# Patient Record
Sex: FEMALE | Race: WHITE | NOT HISPANIC OR LATINO | Employment: FULL TIME | URBAN - METROPOLITAN AREA
[De-identification: names, ages, dates, MRNs, and addresses within clinical notes are randomized per-mention and may not be internally consistent; named-entity substitution may affect disease eponyms.]

---

## 2017-01-04 ENCOUNTER — DOCUMENTATION ONLY (OUTPATIENT)
Dept: OTHER | Facility: CLINIC | Age: 37
End: 2017-01-04

## 2017-01-04 DIAGNOSIS — Z71.89 ACP (ADVANCE CARE PLANNING): Primary | Chronic | ICD-10-CM

## 2017-01-24 ENCOUNTER — OFFICE VISIT (OUTPATIENT)
Dept: FAMILY MEDICINE | Facility: CLINIC | Age: 37
End: 2017-01-24
Payer: COMMERCIAL

## 2017-01-24 VITALS
HEART RATE: 78 BPM | WEIGHT: 256.4 LBS | OXYGEN SATURATION: 100 % | DIASTOLIC BLOOD PRESSURE: 74 MMHG | BODY MASS INDEX: 40.15 KG/M2 | SYSTOLIC BLOOD PRESSURE: 112 MMHG | TEMPERATURE: 97.7 F

## 2017-01-24 DIAGNOSIS — L30.8 OTHER ECZEMA: ICD-10-CM

## 2017-01-24 DIAGNOSIS — L30.9 DERMATITIS: Primary | ICD-10-CM

## 2017-01-24 PROCEDURE — 99213 OFFICE O/P EST LOW 20 MIN: CPT | Performed by: PHYSICIAN ASSISTANT

## 2017-01-24 RX ORDER — PREDNISONE 20 MG/1
20 TABLET ORAL DAILY
Qty: 5 TABLET | Refills: 0 | Status: SHIPPED | OUTPATIENT
Start: 2017-01-24 | End: 2017-04-29

## 2017-01-24 RX ORDER — TRIAMCINOLONE ACETONIDE 1 MG/G
CREAM TOPICAL
Qty: 30 G | Refills: 1 | Status: SHIPPED | OUTPATIENT
Start: 2017-01-24 | End: 2017-05-26

## 2017-01-24 ASSESSMENT — ENCOUNTER SYMPTOMS
FOCAL WEAKNESS: 0
NAUSEA: 0
CHILLS: 0
DIARRHEA: 0
FEVER: 0
SHORTNESS OF BREATH: 0
VOMITING: 0
HEADACHES: 0
ABDOMINAL PAIN: 0

## 2017-01-24 NOTE — PROGRESS NOTES
SUBJECTIVE:                                                    Karolina Herrera is a 37 year old female who presents to clinic today for the following health issues:    Rash     Onset: 1 week     Description:   Location: Abdomen, arms and bottom   Character: raised, red, painful/prickly   Itching (Pruritis): YES    Progression of Symptoms:  Improving slightly     Accompanying Signs & Symptoms:  Fever: no   Body aches or joint pain: no   Sore throat symptoms: no   Recent cold symptoms: no    History:   Previous similar rash: YES- always went away on its own in the past     Precipitating factors:   Exposure to similar rash: no   New exposures: soaps- used baby bubble bath   Recent travel: no     Alleviating factors:  None     Therapies Tried and outcome: Benadryl- not helpful, cleaning the skin, lotion    Concern - Derm Problem     Onset: Off and on x1 year     Description:   Cracking and peeling of skin on both hands    Intensity: moderate    Progression of Symptoms:  worsening    Accompanying Signs & Symptoms:  None        Previous history of similar problem:   No    Precipitating factors:   Worsened by: cold weather    Alleviating factors:  Improved by: None        Therapies Tried and outcome: Vaseline and hydrocortisone- not helpful      Additional complaints: None    HPI additional notes:  Denies throat/tongue swelling, abd pain, N/V, CP, SOB, sore throat, or any other symptoms.       Chart Review:  History   Smoking status     Never Smoker    Smokeless tobacco     Not on file     No flowsheet data found.  No flowsheet data found.      Patient Active Problem List   Diagnosis     Morbid obesity due to excess calories (H)     Family history of malignant neoplasm of breast     PCOS (polycystic ovarian syndrome)     Irregular menstrual cycle     Irritable bowel syndrome     Body mass index (BMI) greater than 30 in adult     Right-sided low back pain without sciatica     Low back pain     Obesity with body mass  index 30 or greater     Candidiasis of mouth     Encounter for gynecological examination without abnormal finding     ACP (advance care planning)     Past Surgical History   Procedure Laterality Date     [place] teeth removed       Nasal/sinus polypectomy       Ivs       inveitro fertilization     Laparoscopic cholecystectomy N/A 11/7/2016     Procedure: LAPAROSCOPIC CHOLECYSTECTOMY;  Surgeon: Juan F Sherman MD;  Location:  OR     Problem list, Medication list, Allergies, Medical/Social/Surg hx reviewed in [facility], updated as appropriate.    HPI      Review of Systems   Constitutional: Negative for fever and chills.   Respiratory: Negative for shortness of breath.    Cardiovascular: Negative for chest pain.   Gastrointestinal: Negative for nausea, vomiting, abdominal pain and diarrhea.   Skin: Positive for itching and rash.   Neurological: Negative for focal weakness and headaches.   All other systems reviewed and are negative.        Physical Exam   Constitutional: She is oriented to person, place, and time and well-developed, well-nourished, and in no distress.   HENT:   Head: Normocephalic and atraumatic.   Right Ear: Tympanic membrane, external ear and ear canal normal.   Left Ear: Tympanic membrane, external ear and ear canal normal.   Nose: Nose normal.   Mouth/Throat: Uvula is midline and oropharynx is clear and moist. No posterior oropharyngeal edema.   Cardiovascular: Normal rate, regular rhythm and normal heart sounds.    Pulmonary/Chest: Effort normal and breath sounds normal.   Musculoskeletal: Normal range of motion.   Neurological: She is alert and oriented to person, place, and time. Gait normal.   Skin: Skin is warm and dry. Rash noted. No bruising noted.   Dry peeling skin to thenar eminences of both hands- No purulent drainage, increased warmth, or erythema    Fine papular rash to abdomen, bilateral arms, buttock, and low back.    Nursing note and vitals reviewed.      Vital Signs  /74  "mmHg  Pulse 78  Temp(Src) 97.7  F (36.5  C) (Oral)  Wt 256 lb 6.4 oz (116.302 kg)  SpO2 100%   Body mass index is 40.15 kg/(m^2).    Diagnostic Test Results:  none     ASSESSMENT/PLAN:                                                    BMI:   Estimated body mass index is 40.15 kg/(m^2) as calculated from the following:    Height as of 11/7/16: 5' 7.01\" (1.702 m).    Weight as of this encounter: 256 lb 6.4 oz (116.302 kg).   Weight management plan: healthy diet & exercise      ICD-10-CM    1. Dermatitis L30.9 predniSONE (DELTASONE) 20 MG tablet   2. Other eczema L30.8 triamcinolone (KENALOG) 0.1 % cream   Rash to hands c/w eczema. Rx triamcinolone. Recommended Eucerin or other thick lotion.  Rash to trunk & bilateral arms c/w dermatitis, possible allergic reaction to bubble bath. No oropharyngeal edema or wheezing, or other s/sx anaphylaxis. Rx short course of low dose prednisone. Continue Benadryl.    I have discussed any lab or imaging results, the patient's diagnosis, and my plan of treatment with the patient and/or family. Patient is aware to come back in if with worsening symptoms or if no relief despite treatment plan.  Patient voiced understanding and had no further questions.       Follow Up: Return if symptoms worsen or fail to improve.    LAKEISHA Hansen, PA-C  [facility] [place]            "

## 2017-02-01 ENCOUNTER — THERAPY VISIT (OUTPATIENT)
Dept: PHYSICAL THERAPY | Facility: CLINIC | Age: 37
End: 2017-02-01
Payer: COMMERCIAL

## 2017-02-01 DIAGNOSIS — M54.50 RIGHT-SIDED LOW BACK PAIN WITHOUT SCIATICA: Primary | ICD-10-CM

## 2017-02-01 PROCEDURE — 97112 NEUROMUSCULAR REEDUCATION: CPT | Mod: GP | Performed by: PHYSICAL THERAPIST

## 2017-02-01 PROCEDURE — 97110 THERAPEUTIC EXERCISES: CPT | Mod: GP | Performed by: PHYSICAL THERAPIST

## 2017-02-07 ENCOUNTER — OFFICE VISIT (OUTPATIENT)
Dept: FAMILY MEDICINE | Facility: CLINIC | Age: 37
End: 2017-02-07
Payer: COMMERCIAL

## 2017-02-07 VITALS
OXYGEN SATURATION: 97 % | WEIGHT: 251.5 LBS | HEART RATE: 100 BPM | BODY MASS INDEX: 39.47 KG/M2 | TEMPERATURE: 99.1 F | SYSTOLIC BLOOD PRESSURE: 120 MMHG | DIASTOLIC BLOOD PRESSURE: 86 MMHG | HEIGHT: 67 IN

## 2017-02-07 DIAGNOSIS — R50.9 FEVER, UNSPECIFIED: ICD-10-CM

## 2017-02-07 DIAGNOSIS — J10.1 INFLUENZA A: Primary | ICD-10-CM

## 2017-02-07 DIAGNOSIS — R07.0 THROAT PAIN: ICD-10-CM

## 2017-02-07 LAB
DEPRECATED S PYO AG THROAT QL EIA: NORMAL
FLUAV+FLUBV AG SPEC QL: ABNORMAL
FLUAV+FLUBV AG SPEC QL: ABNORMAL
MICRO REPORT STATUS: NORMAL
SPECIMEN SOURCE: ABNORMAL
SPECIMEN SOURCE: NORMAL

## 2017-02-07 PROCEDURE — 87880 STREP A ASSAY W/OPTIC: CPT | Performed by: FAMILY MEDICINE

## 2017-02-07 PROCEDURE — 87081 CULTURE SCREEN ONLY: CPT | Performed by: FAMILY MEDICINE

## 2017-02-07 PROCEDURE — 87804 INFLUENZA ASSAY W/OPTIC: CPT | Performed by: FAMILY MEDICINE

## 2017-02-07 PROCEDURE — 99214 OFFICE O/P EST MOD 30 MIN: CPT | Performed by: FAMILY MEDICINE

## 2017-02-07 RX ORDER — ALBUTEROL SULFATE 90 UG/1
2 AEROSOL, METERED RESPIRATORY (INHALATION) EVERY 6 HOURS PRN
Qty: 1 INHALER | Refills: 0 | Status: SHIPPED | OUTPATIENT
Start: 2017-02-07 | End: 2017-05-26

## 2017-02-07 RX ORDER — OSELTAMIVIR PHOSPHATE 75 MG/1
75 CAPSULE ORAL 2 TIMES DAILY
Qty: 10 CAPSULE | Refills: 0 | Status: SHIPPED | OUTPATIENT
Start: 2017-02-07 | End: 2017-04-29

## 2017-02-07 NOTE — PROGRESS NOTES
SUBJECTIVE:                                                    Karolina Herrera is a 37 year old female who presents to clinic today for the following health issues:    Acute Illness   Acute illness concerns: cough  Onset: 4 days     Fever: YES- 101.8F oral     Chills/Sweats: YES- both    Headache (location?): YES- frontal     Sinus Pressure:no    Conjunctivitis:  no    Ear Pain: YES: bilateral    Rhinorrhea: YES    Congestion: YES    Sore Throat: YES-hoarse voice      Cough: YES-productive of sputum, with shortness of breath all the time, worsening over time    Wheeze: YES    Decreased Appetite: YES    Nausea: no    Vomiting: no    Diarrhea:  YES    Dysuria/Freq.: no    Fatigue/Achiness: YES    Sick/Strep Exposure: YES- son and cousin were sick and nephew and niece had strep last week and were on antibiotics      Therapies Tried and outcome: nyquil, tylenol and cough drops     10 yrs ago strep.     Short of breath and chest burning with coughing.     Her wife is accompanying her and she is also seeing me. She is renal transplant patient and I diagnosed her with influenza a and treat her with tamiful.    Both of them have flu shots.     Problem list and histories reviewed & adjusted, as indicated.  Additional history: as documented    Problem list, Medication list, Allergies, and Medical/Social/Surgical histories reviewed in EPIC and updated as appropriate.      Social History     Social History     Marital Status:      Spouse Name: Samantha     Number of Children: N/A     Years of Education: N/A     Occupational History           works for [employer]     Social History Main Topics     Smoking status: Never Smoker      Smokeless tobacco: None     Alcohol Use: No     Drug Use: No     Sexual Activity:     Partners: Female      Comment: pregnant via IVF, due in August 2015     Other Topics Concern      Service No     Blood Transfusions No     Caffeine Concern No     Occupational Exposure No  "    Hobby Hazards No     Sleep Concern No     Stress Concern No     Weight Concern No     Special Diet No     Back Care No     Exercise Yes     walks dog 2-3 times/day; prenatal exercises     Seat Belt Yes     Self-Exams Yes     Social History Narrative     No Known Allergies  Patient Active Problem List   Diagnosis     Morbid obesity due to excess calories (H)     Family history of malignant neoplasm of breast     PCOS (polycystic ovarian syndrome)     Irregular menstrual cycle     Irritable bowel syndrome     Body mass index (BMI) greater than 30 in adult     Right-sided low back pain without sciatica     Low back pain     Obesity with body mass index 30 or greater     Candidiasis of mouth     Encounter for gynecological examination without abnormal finding     ACP (advance care planning)     Reviewed medications, social history and  past medical and surgical history.    Review of system: for general, respiratory, CVS, GI and psychiatry negative except for noted above.     EXAM:  /86 mmHg  Pulse 100  Temp(Src) 99.1  F (37.3  C) (Oral)  Ht 5' 7.25\" (1.708 m)  Wt 251 lb 8 oz (114.08 kg)  BMI 39.11 kg/m2  SpO2 97%  Constitutional: healthy, alert and no distress   Psychiatric: mentation appears normal and affect normal/bright  Cardiovascular: RRR. No murmurs,  Respiratory: negative, Lungs clear. No crackles or wheezing. No tachypnea. Decreased air entry both lung bases.   ENT: Both TM exam normal, no neck nodes or sinus tenderness, mild nasal turbinate hypertrophy, throat mild erythema     ASSESSMENT / PLAN:  (J10.1) Influenza A  (primary encounter diagnosis)  Comment: unfortunately beyond 72 hrs period. Discussed home care. Wife who is diagnosed with influenza and due to her immunocompromised status, she got tamiflu and she is requesting patient can get that too. As Karolina comes in contact with immunocompromised patient it may be OK to use but I am not entirely sure if it is going to shorten her duration " and Karolina does not have any risk factor. They understood. OK to do tamiflu for Karolina too.   Patient also has some chest tightness with cough, OK to use albuterol as needed. May need xray to rule out secondary infection if symptoms are not improving.   Plan: albuterol (PROAIR HFA/PROVENTIL HFA/VENTOLIN         HFA) 108 (90 BASE) MCG/ACT Inhaler, oseltamivir        (TAMIFLU) 75 MG capsule             (R07.0) Throat pain  Comment:  See above. Strep negative.   Plan: Strep, Rapid Screen, Beta strep group A culture             (R50.9) Fever, unspecified  Comment:    Plan: Influenza A/B antigen, albuterol (PROAIR         HFA/PROVENTIL HFA/VENTOLIN HFA) 108 (90 BASE)         MCG/ACT Inhaler

## 2017-02-07 NOTE — NURSING NOTE
"Chief Complaint   Patient presents with     Cough       Initial /86 mmHg  Pulse 100  Temp(Src) 99.1  F (37.3  C) (Oral)  Ht 5' 7.25\" (1.708 m)  Wt 251 lb 8 oz (114.08 kg)  BMI 39.11 kg/m2  SpO2 97% Estimated body mass index is 39.11 kg/(m^2) as calculated from the following:    Height as of this encounter: 5' 7.25\" (1.708 m).    Weight as of this encounter: 251 lb 8 oz (114.08 kg).  Medication Reconciliation: complete     Christiana Jane CMA      "

## 2017-02-09 LAB
BACTERIA SPEC CULT: NORMAL
MICRO REPORT STATUS: NORMAL
SPECIMEN SOURCE: NORMAL

## 2017-02-13 ENCOUNTER — HOSPITAL ENCOUNTER (OUTPATIENT)
Dept: GENERAL RADIOLOGY | Facility: CLINIC | Age: 37
Discharge: HOME | End: 2017-02-13
Attending: PHYSICIAN ASSISTANT | Admitting: PHYSICIAN ASSISTANT
Payer: COMMERCIAL

## 2017-02-13 ENCOUNTER — OFFICE VISIT (OUTPATIENT)
Dept: FAMILY MEDICINE | Facility: CLINIC | Age: 37
End: 2017-02-13
Payer: COMMERCIAL

## 2017-02-13 DIAGNOSIS — R07.0 THROAT PAIN: Primary | ICD-10-CM

## 2017-02-13 DIAGNOSIS — R05.9 COUGH: ICD-10-CM

## 2017-02-13 LAB
DEPRECATED S PYO AG THROAT QL EIA: NORMAL
MICRO REPORT STATUS: NORMAL
SPECIMEN SOURCE: NORMAL

## 2017-02-13 PROCEDURE — 71020 XR CHEST 2 VW: CPT

## 2017-02-13 PROCEDURE — 99213 OFFICE O/P EST LOW 20 MIN: CPT | Performed by: PHYSICIAN ASSISTANT

## 2017-02-13 PROCEDURE — 87081 CULTURE SCREEN ONLY: CPT | Performed by: PHYSICIAN ASSISTANT

## 2017-02-13 PROCEDURE — 87880 STREP A ASSAY W/OPTIC: CPT | Performed by: PHYSICIAN ASSISTANT

## 2017-02-13 RX ORDER — METHYLPREDNISOLONE 4 MG
TABLET, DOSE PACK ORAL
Qty: 21 TABLET | Refills: 0 | Status: SHIPPED | OUTPATIENT
Start: 2017-02-13 | End: 2017-04-29

## 2017-02-13 RX ORDER — BENZONATATE 100 MG/1
100 CAPSULE ORAL 3 TIMES DAILY PRN
Qty: 16 CAPSULE | Refills: 0 | Status: SHIPPED | OUTPATIENT
Start: 2017-02-13 | End: 2017-05-26

## 2017-02-13 ASSESSMENT — ENCOUNTER SYMPTOMS
ABDOMINAL PAIN: 0
MYALGIAS: 1
CHILLS: 0
FEVER: 1
SORE THROAT: 1
DIARRHEA: 0
NAUSEA: 0
FOCAL WEAKNESS: 0
WHEEZING: 0
HEADACHES: 0
SHORTNESS OF BREATH: 1
COUGH: 1
VOMITING: 0

## 2017-02-13 NOTE — PROGRESS NOTES
HPI    SUBJECTIVE:                                                    Karolina Herrera is a 37 year old female who presents to clinic today for the following health issues:    Acute Illness   Acute illness concerns: Flu concerns, had Influenza A last week  Onset: 9 days    Fever: no    Chills/Sweats: YES- sweats    Headache (location?): no    Sinus Pressure:no    Conjunctivitis:  no    Ear Pain: YES: both    Rhinorrhea: YES    Congestion: YES    Sore Throat: YES     Cough: YES    Wheeze: no    Decreased Appetite: no    Nausea: no    Vomiting: no    Diarrhea:  YES    Dysuria/Freq.: no    Fatigue/Achiness: YES    Sick/Strep Exposure: YES     Therapies Tried and outcome: Tamiflu last week, Albuterol inhaler    Pt reports all symptoms except cough and SOB have improved. Albuterol does not provide relief. Completed Tamiflu. She also developed a sore throat 2 days ago. No fever recently.    Chart Review:  History   Smoking Status     Never Smoker   Smokeless Tobacco     Not on file     No flowsheet data found.  No flowsheet data found.    Patient Active Problem List   Diagnosis     Morbid obesity due to excess calories (H)     Family history of malignant neoplasm of breast     PCOS (polycystic ovarian syndrome)     Irregular menstrual cycle     Irritable bowel syndrome     Body mass index (BMI) greater than 30 in adult     Right-sided low back pain without sciatica     Low back pain     Obesity with body mass index 30 or greater     Candidiasis of mouth     Encounter for gynecological examination without abnormal finding     ACP (advance care planning)     Past Surgical History   Procedure Laterality Date     [place] teeth removed       Nasal/sinus polypectomy       Ivs       inveitro fertilization     Laparoscopic cholecystectomy N/A 11/7/2016     Procedure: LAPAROSCOPIC CHOLECYSTECTOMY;  Surgeon: Juan F Sherman MD;  Location:  OR     Problem list, Medication list, Allergies, Medical/Social/Surg hx reviewed in  [facility], updated as appropriate.        Review of Systems   Constitutional: Positive for fever (resolved). Negative for chills.   HENT: Positive for congestion (resolved), ear pain (resolved) and sore throat.    Respiratory: Positive for cough and shortness of breath. Negative for wheezing.    Cardiovascular: Negative for chest pain.   Gastrointestinal: Negative for abdominal pain, diarrhea, nausea and vomiting.   Musculoskeletal: Positive for myalgias (resolved).   Skin: Negative for rash.   Neurological: Negative for focal weakness and headaches.   All other systems reviewed and are negative.        Physical Exam   Constitutional: She is oriented to person, place, and time and well-developed, well-nourished, and in no distress.   HENT:   Head: Normocephalic and atraumatic.   Right Ear: Tympanic membrane, external ear and ear canal normal.   Left Ear: Tympanic membrane, external ear and ear canal normal.   Nose: Nose normal.   Mouth/Throat: Uvula is midline and mucous membranes are normal. Posterior oropharyngeal erythema present. No oropharyngeal exudate, posterior oropharyngeal edema or tonsillar abscesses.   Cardiovascular: Normal rate, regular rhythm and normal heart sounds.    Pulmonary/Chest: Effort normal and breath sounds normal.   Musculoskeletal: Normal range of motion.   Neurological: She is alert and oriented to person, place, and time. Gait normal.   Skin: Skin is warm and dry.   Nursing note and vitals reviewed.    Vital Signs  There were no vitals taken for this visit.   There is no height or weight on file to calculate BMI.    Diagnostic Test Results:  Results for orders placed or performed in visit on 02/13/17 (from the past 24 hour(s))   Strep, Rapid Screen   Result Value Ref Range    Specimen Description Throat     Rapid Strep A Screen       NEGATIVE: No Group A streptococcal antigen detected by immunoassay, await   culture report.      Micro Report Status FINAL 02/13/2017      CXR: unremarkable by  "my read    ASSESSMENT/PLAN:                                                    BMI:   Estimated body mass index is 39.1 kg/(m^2) as calculated from the following:    Height as of 2/7/17: 5' 7.25\" (1.708 m).    Weight as of 2/7/17: 251 lb 8 oz (114.1 kg).   Weight management plan: Patient was referred to their PCP to discuss a diet and exercise plan.      ICD-10-CM    1. Throat pain R07.0 Strep, Rapid Screen     Beta strep group A culture   2. Cough R05 XR Chest 2 Views     benzonatate (TESSALON) 100 MG capsule     methylPREDNISolone (MEDROL DOSEPAK) 4 MG tablet   Lungs CTAB, no resp distress. Pt speaking in complete sentences.  Strep negative. CXR unremarkable by my read.  Suspect cough residual- Rx Tessalon perles and Medrol dosepak, continue albuterol inhaler.    I have discussed any lab or imaging results, the patient's diagnosis, and my plan of treatment with the patient and/or family. Patient is aware to come back in if with worsening symptoms or if no relief despite treatment plan.  Patient voiced understanding and had no further questions.       Follow Up: Return if symptoms worsen or fail to improve.    LAKEISHA Hansen, PA-C  [facility] [place]              "

## 2017-02-13 NOTE — MR AVS SNAPSHOT
After Visit Summary   2/13/2017    Karolina Herrera    MRN: [id number]           Patient Information     Date Of Birth          1980        Visit Information        Provider Department      2/13/2017 2:20 PM Joslyn Bach PA-C [facility] [place]        Today's Diagnoses     Throat pain    -  1    Cough           Follow-ups after your visit        Follow-up notes from your care team     Return if symptoms worsen or fail to improve.      Your next 10 appointments already scheduled     May 05, 2017  3:00 PM CDT   (Arrive by 2:45 PM)   Return Visit with MIKEL Richey [facility]    [address]  [address] Floor  [facility] [address] [phone]              Who to contact     If you have questions or need follow up information about today's clinic visit or your schedule please contact [facility] [place] directly at [phone].  Normal or non-critical lab and imaging results will be communicated to you by [employer], letter or phone within 4 business days after the clinic has received the results. If you do not hear from us within 7 days, please contact the clinic through [facility] or phone. If you have a critical or abnormal lab result, we will notify you by phone as soon as possible.  Submit refill requests through [employer] or call your pharmacy and they will forward the refill request to us. Please allow 3 business days for your refill to be completed.          Additional Information About Your Visit        MyChart Information     [employer] gives you secure access to your electronic health record. If you see a primary care provider, you can also send messages to your care team and make appointments. If you have questions, please call your primary care clinic.  If you do not have a primary care provider, please call [phone] and they will assist you.        Care EveryWhere ID     This is  your Care EveryWhere ID. This could be used by other organizations to access your [place] medical records  [id number]         Blood Pressure from Last 3 Encounters:   02/07/17 120/86   01/24/17 112/74   11/17/16 102/74    Weight from Last 3 Encounters:   02/07/17 251 lb 8 oz (114.1 kg)   01/24/17 256 lb 6.4 oz (116.3 kg)   11/07/16 251 lb 12.8 oz (114.2 kg)              We Performed the Following     Beta strep group A culture     Strep, Rapid Screen     XR Chest 2 Views          Today's Medication Changes          These changes are accurate as of: 2/13/17  4:25 PM.  If you have any questions, ask your nurse or doctor.               Start taking these medicines.        Dose/Directions    benzonatate 100 MG capsule   Commonly known as:  TESSALON   Used for:  Cough   Started by:  Joslyn Bach PA-C        Dose:  100 mg   Take 1 capsule (100 mg) by mouth 3 times daily as needed for cough   Quantity:  16 capsule   Refills:  0       methylPREDNISolone 4 MG tablet   Commonly known as:  MEDROL DOSEPAK   Used for:  Cough   Started by:  Joslyn Bach PA-C        Follow package instructions   Quantity:  21 tablet   Refills:  0            Where to get your medicines      These medications were sent to [place] [address] [place] [address]     Phone:  [phone]     benzonatate 100 MG capsule    methylPREDNISolone 4 MG tablet                Primary Care Provider    Unknown Primary MD Zaki       No address on file        Thank you!     Thank you for choosing [facility] [place]  for your care. Our goal is always to provide you with excellent care. Hearing back from our patients is one way we can continue to improve our services. Please take a few minutes to complete the written survey that you may receive in the mail after your visit with us. Thank you!             Your Updated Medication List - Protect others around you: Learn how to  safely use, store and throw away your medicines at www.disposemymeds.org.          This list is accurate as of: 2/13/17  4:25 PM.  Always use your most recent med list.                   Brand Name Dispense Instructions for use    albuterol 108 (90 BASE) MCG/ACT Inhaler    PROAIR HFA/PROVENTIL HFA/VENTOLIN HFA    1 Inhaler    Inhale 2 puffs into the lungs every 6 hours as needed for shortness of breath / dyspnea or wheezing       benzonatate 100 MG capsule    TESSALON    16 capsule    Take 1 capsule (100 mg) by mouth 3 times daily as needed for cough       IRON SUPPLEMENT PO      Take 325 mg by mouth       methylPREDNISolone 4 MG tablet    MEDROL DOSEPAK    21 tablet    Follow package instructions       oseltamivir 75 MG capsule    TAMIFLU    10 capsule    Take 1 capsule (75 mg) by mouth 2 times daily       predniSONE 20 MG tablet    DELTASONE    5 tablet    Take 1 tablet (20 mg) by mouth daily       PRENATAL VIT W/ FE BISG-FA PO          triamcinolone 0.1 % cream    KENALOG    30 g    Apply topically 1-2 times a day prn.

## 2017-02-13 NOTE — NURSING NOTE
"Chief Complaint   Patient presents with     RECHECK       Initial There were no vitals taken for this visit. Estimated body mass index is 39.1 kg/(m^2) as calculated from the following:    Height as of 2/7/17: 5' 7.25\" (1.708 m).    Weight as of 2/7/17: 251 lb 8 oz (114.1 kg).    Ragini Ames MA      "

## 2017-02-15 LAB
BACTERIA SPEC CULT: NORMAL
MICRO REPORT STATUS: NORMAL
SPECIMEN SOURCE: NORMAL

## 2017-04-29 ENCOUNTER — OFFICE VISIT (OUTPATIENT)
Dept: URGENT CARE | Facility: URGENT CARE | Age: 37
End: 2017-04-29
Payer: COMMERCIAL

## 2017-04-29 VITALS
TEMPERATURE: 98.5 F | OXYGEN SATURATION: 97 % | WEIGHT: 257 LBS | HEART RATE: 79 BPM | BODY MASS INDEX: 40.34 KG/M2 | DIASTOLIC BLOOD PRESSURE: 68 MMHG | HEIGHT: 67 IN | SYSTOLIC BLOOD PRESSURE: 102 MMHG

## 2017-04-29 DIAGNOSIS — N94.89 LABIAL SWELLING: Primary | ICD-10-CM

## 2017-04-29 PROCEDURE — 99213 OFFICE O/P EST LOW 20 MIN: CPT | Performed by: FAMILY MEDICINE

## 2017-04-29 RX ORDER — SULFAMETHOXAZOLE/TRIMETHOPRIM 800-160 MG
1 TABLET ORAL 2 TIMES DAILY
Qty: 20 TABLET | Refills: 0 | Status: SHIPPED | OUTPATIENT
Start: 2017-04-29 | End: 2017-05-26

## 2017-04-29 NOTE — MR AVS SNAPSHOT
After Visit Summary   4/29/2017    Karolina Herrera    MRN: [id number]           Patient Information     Date Of Birth          1980        Visit Information        Provider Department      4/29/2017 8:30 PM Jayson Viramontes MD [facility]        Today's Diagnoses     Labial swelling    -  1       Follow-ups after your visit        Your next 10 appointments already scheduled     May 12, 2017  2:30 PM CDT   (Arrive by 2:15 PM)   Return Visit with MIKEL Richey [facility] ([facility])    [address] [place] [id number] [phone]            May 12, 2017  3:15 PM CDT   (Arrive by 3:00 PM)   MR BREAST BILATERAL W/O & W CONTRAST with [address] MRI ([facility])    [address] [place] [id number] [phone]           Take your medicines as usual, unless your doctor tells you not to. Bring a list of your current medicines to your exam (including vitamins, minerals and over-the-counter drugs).  The timing of your exam may depend on the start of your last period. If you re in menopause, you may have the exam anytime.  Please bring any previous mammograms or breast MRIs from other facilities to the MRI dept. Do not mail these items to us.  You will have contrast for this exam. To prepare:   The day before your exam, drink extra fluids at least six 8-ounce glasses (unless your doctor tells you to restrict your fluids).   Have a blood test (creatinine test) within 30 days of your exam. Go to your clinic or Diagnostic Imaging Department for this test.  The MRI machine uses a strong magnet. Please wear clothes without metal (snaps, zippers). A sweatsuit works well, or we may give you a hospital gown.  Please remove any body piercings and hair extensions before you arrive. You will also remove watches, jewelry,  hairpins, wallets, dentures, partial dental plates and hearing aids. You may wear contact lenses, and you may be able to wear your rings. We have a safe place to keep your personal items, but it is safer to leave them at home.   **IMPORTANT** THE INSTRUCTIONS BELOW ARE ONLY FOR THOSE PATIENTS WHO HAVE BEEN TOLD THEY WILL RECEIVE SEDATION OR GENERAL ANESTHESIA DURING THEIR MRI PROCEDURE:  IF YOU WILL RECEIVE SEDATION (take medicine to help you relax during your exam)   You must get the medicine from your doctor before you arrive. Bring the medicine to the exam. Do not take it at home.   Arrive one hour early. Bring someone who can take you home after the test. Your medicine will make you sleepy. After the exam, you may not drive, take a bus or take a taxi by yourself.   No eating 8 hours before your exam. You may have clear liquids up until 4 hours before your exam. (Clear liquids include water, clear tea, black coffee and fruit juice without pulp.)  IF YOU WILL RECEIVE ANESTHESIA (be asleep for your exam)   Arrive 1 1/2 hours early. Bring someone who can take you home after the test. You may not drive, take a bus or take a taxi by yourself.   No eating 8 hours before your exam. You may have clear liquids up until 4 hours before your exam. (Clear liquids include water, clear tea, black coffee and fruit juice without pulp.)  If you have any questions, please contact your Imaging Department exam site.              Who to contact     If you have questions or need follow up information about today's clinic visit or your schedule please contact [facility] directly at [phone].  Normal or non-critical lab and imaging results will be communicated to you by MyChart, letter or phone within 4 business days after the clinic has received the results. If you do not hear from us within 7 days, please contact the clinic through MyChart or phone. If you have a critical or abnormal lab result, we will  "notify you by phone as soon as possible.  Submit refill requests through [employer] or call your pharmacy and they will forward the refill request to us. Please allow 3 business days for your refill to be completed.          Additional Information About Your Visit        [employer] Information     [employer] gives you secure access to your electronic health record. If you see a primary care provider, you can also send messages to your care team and make appointments. If you have questions, please call your primary care clinic.  If you do not have a primary care provider, please call [phone] and they will assist you.        Care EveryWhere ID     This is your Care EveryWhere ID. This could be used by other organizations to access your [place] medical records  [id number]        Your Vitals Were     Pulse Temperature Height Last Period Pulse Oximetry BMI (Body Mass Index)    79 98.5  F (36.9  C) (Oral) 5' 7\" (1.702 m) 04/05/2017 97% 40.25 kg/m2       Blood Pressure from Last 3 Encounters:   04/29/17 102/68   02/07/17 120/86   01/24/17 112/74    Weight from Last 3 Encounters:   04/29/17 257 lb (116.6 kg)   02/07/17 251 lb 8 oz (114.1 kg)   01/24/17 256 lb 6.4 oz (116.3 kg)              Today, you had the following     No orders found for display         Today's Medication Changes          These changes are accurate as of: 4/29/17 11:59 PM.  If you have any questions, ask your nurse or doctor.               Start taking these medicines.        Dose/Directions    sulfamethoxazole-trimethoprim 800-160 MG per tablet   Commonly known as:  BACTRIM DS/SEPTRA DS   Used for:  Labial swelling   Started by:  Jayson Viramontes MD        Dose:  1 tablet   Take 1 tablet by mouth 2 times daily   Quantity:  20 tablet   Refills:  0            Where to get your medicines      These medications were sent to [facility] [id number] [address] AT [address] " [id number]    Hours:  24-hours Phone:  [phone]     sulfamethoxazole-trimethoprim 800-160 MG per tablet                Primary Care Provider    Unknown Primary MD Zaki       No address on file        Thank you!     Thank you for choosing [facility]  for your care. Our goal is always to provide you with excellent care. Hearing back from our patients is one way we can continue to improve our services. Please take a few minutes to complete the written survey that you may receive in the mail after your visit with us. Thank you!             Your Updated Medication List - Protect others around you: Learn how to safely use, store and throw away your medicines at www.disposemymeds.org.          This list is accurate as of: 4/29/17 11:59 PM.  Always use your most recent med list.                   Brand Name Dispense Instructions for use    albuterol 108 (90 BASE) MCG/ACT Inhaler    PROAIR HFA/PROVENTIL HFA/VENTOLIN HFA    1 Inhaler    Inhale 2 puffs into the lungs every 6 hours as needed for shortness of breath / dyspnea or wheezing       benzonatate 100 MG capsule    TESSALON    16 capsule    Take 1 capsule (100 mg) by mouth 3 times daily as needed for cough       IRON SUPPLEMENT PO      Take 325 mg by mouth Reported on 4/29/2017       PRENATAL VIT W/ FE BISG-FA PO      Reported on 4/29/2017       sulfamethoxazole-trimethoprim 800-160 MG per tablet    BACTRIM DS/SEPTRA DS    20 tablet    Take 1 tablet by mouth 2 times daily       triamcinolone 0.1 % cream    KENALOG    30 g    Apply topically 1-2 times a day prn.

## 2017-04-30 NOTE — NURSING NOTE
"Chief Complaint   Patient presents with     Urgent Care     Mass     Started to notice right groin discomfort, tender swollen area, 2 days ago.  Swelling has increased.  Has tried hot baths but has not noted any drainage from mass.  Has felt slight nausea and malaise as well.     Initial /68  Pulse 79  Temp 98.5  F (36.9  C) (Oral)  Ht 5' 7\" (1.702 m)  Wt 257 lb (116.6 kg)  LMP 04/05/2017  SpO2 97%  BMI 40.25 kg/m2 Estimated body mass index is 40.25 kg/(m^2) as calculated from the following:    Height as of this encounter: 5' 7\" (1.702 m).    Weight as of this encounter: 257 lb (116.6 kg)..  BP completed using cuff size: isabel Woo RN  "

## 2017-04-30 NOTE — PROGRESS NOTES
SUBJECTIVE:   Chief Complaint   Patient presents with     Urgent Care     Mass     Started to notice right groin discomfort, tender swollen area, 2 days ago.  Swelling has increased.  Has tried hot baths but has not noted any drainage from mass.  Has felt slight nausea and malaise as well.     Karolina Herrera is a 37 year old female who presents for evaluation of and area of redness, tenderness, swelling and warmth of the skin that developed on the  right, labia majora.  Patient has had lumps, pain and tenderness for 1 week.    Precipitating event was unknown.    Therapies tried: none.    Past Medical History:   Diagnosis Date     Abnormal vaginal bleeding  and        ALLERGIES:  Review of patient's allergies indicates no known allergies.      Current Outpatient Prescriptions on File Prior to Visit:  triamcinolone (KENALOG) 0.1 % cream Apply topically 1-2 times a day prn.   benzonatate (TESSALON) 100 MG capsule Take 1 capsule (100 mg) by mouth 3 times daily as needed for cough (Patient not taking: Reported on 2017)   albuterol (PROAIR HFA/PROVENTIL HFA/VENTOLIN HFA) 108 (90 BASE) MCG/ACT Inhaler Inhale 2 puffs into the lungs every 6 hours as needed for shortness of breath / dyspnea or wheezing (Patient not taking: Reported on 2017)   PRENATAL VIT W/ FE BISG-FA PO Reported on 2017   Ferrous Sulfate (IRON SUPPLEMENT PO) Take 325 mg by mouth Reported on 2017     No current facility-administered medications on file prior to visit.     Social History   Substance Use Topics     Smoking status: Never Smoker     Smokeless tobacco: Never Used     Alcohol use No       Family History   Problem Relation Age of Onset     Esophageal Cancer Maternal Grandfather       in 60s; hx of smoking     Bladder Cancer Paternal Grandfather       in 80s     Breast Cancer Sister 39     invasive ductal carcinoma, treated with lumpectomy, radiation and tamoxifen     Breast Cancer Paternal Aunt      paternal  "great aunt in 70s         ROS:  CONSTITUTIONAL:NEGATIVE for fever, chills, change in weight  : normal menstrual cycles    OBJECTIVE:  /68  Pulse 79  Temp 98.5  F (36.9  C) (Oral)  Ht 5' 7\" (1.702 m)  Wt 257 lb (116.6 kg)  LMP 04/05/2017  SpO2 97%  BMI 40.25 kg/m2    Skin area involved is 3 cm by 3 cm on the right,  labia .   The skin appear erythematous, moderately swollen, with tenderness and warmth to the touch.  No adilson fluctuance   GENERAL APPEARANCE: healthy, alert and no distress  EYES: EOMI,  PERRL, conjunctiva clear  NECK: supple, non-tender to palpation, no adenopathy noted  RESP: lungs clear to auscultation - no rales, rhonchi or wheezes  CV: regular rates and rhythm, normal S1 S2, no murmur noted    ASSESSMENT:  1. Labial swelling  Symptomatic cares were discussed in detail.   Pt instructed to come back to the clinic for worsening sx    - sulfamethoxazole-trimethoprim (BACTRIM DS/SEPTRA DS) 800-160 MG per tablet; Take 1 tablet by mouth 2 times daily  Dispense: 20 tablet; Refill: 0      "

## 2017-05-26 ENCOUNTER — ONCOLOGY VISIT (OUTPATIENT)
Dept: ONCOLOGY | Facility: CLINIC | Age: 37
End: 2017-05-26
Attending: CLINICAL NURSE SPECIALIST
Payer: COMMERCIAL

## 2017-05-26 VITALS
BODY MASS INDEX: 40.81 KG/M2 | SYSTOLIC BLOOD PRESSURE: 113 MMHG | DIASTOLIC BLOOD PRESSURE: 75 MMHG | HEIGHT: 67 IN | WEIGHT: 260 LBS | TEMPERATURE: 97.7 F | RESPIRATION RATE: 18 BRPM | HEART RATE: 89 BPM | OXYGEN SATURATION: 96 %

## 2017-05-26 DIAGNOSIS — Z91.89 AT HIGH RISK FOR BREAST CANCER: ICD-10-CM

## 2017-05-26 DIAGNOSIS — Z12.31 ENCOUNTER FOR SCREENING MAMMOGRAM FOR HIGH-RISK PATIENT: Primary | ICD-10-CM

## 2017-05-26 DIAGNOSIS — Z80.3 FAMILY HISTORY OF MALIGNANT NEOPLASM OF BREAST: ICD-10-CM

## 2017-05-26 PROCEDURE — 99212 OFFICE O/P EST SF 10 MIN: CPT

## 2017-05-26 PROCEDURE — 99213 OFFICE O/P EST LOW 20 MIN: CPT | Mod: ZP | Performed by: CLINICAL NURSE SPECIALIST

## 2017-05-26 ASSESSMENT — PAIN SCALES - GENERAL: PAINLEVEL: NO PAIN (0)

## 2017-05-26 NOTE — NURSING NOTE
"Oncology Rooming Note    May 26, 2017 2:39 PM   Karolina Herrera is a 37 year old female who presents for:    Chief Complaint   Patient presents with     Oncology Clinic Visit     high risk breast ca      Initial Vitals: /75  Pulse 89  Temp 97.7  F (36.5  C) (Oral)  Resp 18  Ht 1.702 m (5' 7\")  Wt 117.9 kg (260 lb)  LMP 04/05/2017  SpO2 96%  BMI 40.72 kg/m2 Estimated body mass index is 40.72 kg/(m^2) as calculated from the following:    Height as of this encounter: 1.702 m (5' 7\").    Weight as of this encounter: 117.9 kg (260 lb). Body surface area is 2.36 meters squared.  No Pain (0) Comment: Data Unavailable   Patient's last menstrual period was 04/05/2017.  Allergies reviewed: Yes  Medications reviewed: Yes    Medications: Medication refills not needed today.  Pharmacy name entered into [employer]:    [id number] [place] [facility] - [place], [place] - [address] [facility] [address] [place] [address] - [address] [place] [address] AVE AT [address]    Clinical concerns:     6 minutes for nursing intake (face to face time)     Lay Camacho CMA                  "

## 2017-05-26 NOTE — PROGRESS NOTES
Oncology Risk Management Consultation:  Date on this visit: 5/26/2017    Karolina Herrera  returns to the clinic today for a six month follow up.  She requires heightened screening and surveillance for her higher risk of breast cancer, secondary to a  family history of invasive ductal carcinoma of the breast in her sister at age 39.  By report, her sister had BRCA1 and BRCA2 testing and was found to be negative.   Karolina is considered to at high risk for breast cancer and has a 22.11% lifetime risk for breast cancer by the TIEN 6.0 model.     Primary Physician: Primary , Unknown     History Of Present Illness:  Ms. Herrera is a very pleasant, healthy  37 year old female who presents with family history of breast cancer in her sister at age 39.     Genetic testing:  No genetic testing to date. Her sister, who had invasive ductal carcinoma at age 39, by report, was negative for genetic mutations in the BRCA1 and BRCA2 genes and Karolina has not had genetic testing. She is considered to at high risk for breast cancer and has a 22.11% lifetime risk for breast cancer by the TIEN 6.0 model.      Pertinent health history:  Menarche at 12.  First child at age 35, conceived using one cycle of IVF.   Premenopausal.  Ovaries and uterus intact.   OCP use for one year.  Hx of mastitis in 2016 with breastfeeding, resolved with antibiotics.  History of breastfeeding x8 months.  No history of breast screening.  No history of breast disease, atypia, malignancy.  No history of breast biopsy.  LMP: 5/11/2017; cycles about 35 days apart, lasting 4 days, heavy on 1st day or 2.     At this visit, she states she feels well and that she has been practicing self exams and denies breast fatigue, pain, tenderness, lumps, unusual nipple discharge, changes in skin or color of her breasts.     Past Medical/Surgical History:  Past Medical History:   Diagnosis Date     Abnormal vaginal bleeding 2008 and 2009     Past Surgical History:    Procedure Laterality Date     IVS      inveitro fertilization     LAPAROSCOPIC CHOLECYSTECTOMY N/A 2016    Procedure: LAPAROSCOPIC CHOLECYSTECTOMY;  Surgeon: Juan F Sherman MD;  [facility]     NASAL/SINUS POLYPECTOMY       wisdom teeth removed         Allergies:  Allergies as of 2017     (No Known Allergies)       Current Medications:  Current Outpatient Prescriptions   Medication Sig Dispense Refill     sulfamethoxazole-trimethoprim (BACTRIM DS/SEPTRA DS) 800-160 MG per tablet Take 1 tablet by mouth 2 times daily 20 tablet 0     benzonatate (TESSALON) 100 MG capsule Take 1 capsule (100 mg) by mouth 3 times daily as needed for cough (Patient not taking: Reported on 2017) 16 capsule 0     albuterol (PROAIR HFA/PROVENTIL HFA/VENTOLIN HFA) 108 (90 BASE) MCG/ACT Inhaler Inhale 2 puffs into the lungs every 6 hours as needed for shortness of breath / dyspnea or wheezing (Patient not taking: Reported on 2017) 1 Inhaler 0     triamcinolone (KENALOG) 0.1 % cream Apply topically 1-2 times a day prn. 30 g 1     PRENATAL VIT W/ FE BISG-FA PO Reported on 2017       Ferrous Sulfate (IRON SUPPLEMENT PO) Take 325 mg by mouth Reported on 2017          Family History:  Family History   Problem Relation Age of Onset     Esophageal Cancer Maternal Grandfather       in 60s; hx of smoking     Bladder Cancer Paternal Grandfather       in 80s     Breast Cancer Sister 39     invasive ductal carcinoma, treated with lumpectomy, radiation and tamoxifen     Breast Cancer Paternal Aunt      paternal great aunt in 70s       Social History:  Social History     Social History     Marital status:      Spouse name: Samantha     Number of children: N/A     Years of education: N/A     Occupational History           works for [employer]     Social History Main Topics     Smoking status: Never Smoker     Smokeless tobacco: Never Used     Alcohol use No     Drug use: No     Sexual  activity: Yes     Partners: Female      Comment: pregnant via IVF, due in August 2015     Other Topics Concern      Service No     Blood Transfusions No     Caffeine Concern No     Occupational Exposure No     Hobby Hazards No     Sleep Concern No     Stress Concern No     Weight Concern No     Special Diet No     Back Care No     Exercise Yes     walks dog 2-3 times/day; prenatal exercises     Seat Belt Yes     Self-Exams Yes     Social History Narrative       Physical Exam:  LMP 04/05/2017    GENERAL APPEARANCE: healthy, alert and no distress     NECK: no adenopathy, no asymmetry or masses     LYMPHATICS: No cervical, supraclavicular, axillary or inguinal lymphadenopathy     RESP: lungs clear to auscultation - no rales, rhonchi or wheezes     CARDIOVASCULAR: regular rate and rhythm, normal S1 S2, no S3 or S4 and no murmur.   BREASTS: Examined in a sitting and lying position. Symmetrical without visible distortion, swelling or rashes. No nipple inversion, nipple discharge, breast dimpling or puckering. Breast tissue is soft to fibrocystic to palpation. In the R breast at 6 oclock prominent fibroglandular tissue that is ropey, which recedes with touch.  Axillary area without masses or lymphadenopathy bilaterally.  SKIN: no suspicious lesions or rashes    Laboratory/Imaging Studies  Results for orders placed or performed in visit on 02/13/17   XR Chest 2 Views    Narrative    CHEST TWO VIEWS  2/13/2017 3:35 PM     HISTORY: Cough.    COMPARISON: None.      Impression    IMPRESSION: Negative chest. Lungs clear.    FABIEN PARIKH MD   Strep, Rapid Screen   Result Value Ref Range    Specimen Description Throat     Rapid Strep A Screen       NEGATIVE: No Group A streptococcal antigen detected by immunoassay, await   culture report.      Micro Report Status FINAL 02/13/2017    Beta strep group A culture   Result Value Ref Range    Specimen Description Throat     Culture Micro No Beta Streptococcus isolated      Micro Report Status FINAL 02/15/2017        ASSESSMENT  We discussed her last screening tests and her upcoming breast MRI.  She is doing well with her exercise plan and is training to do the [employer] women's triathlon later this year (swimming, biking 15 miles and running a 5K.) She is very active with her 2 year old son, Huy.  She states she eats 5-7 vegetables and fruits per day and has 1-2 alcoholic drinks per week.  She does not smoke.      She may have another cycle of IVF next winter. She has an understanding that no breast screening imaging would be done during pregnancy. The plan would change to resume routine screening 6 months after the cessation of breastfeeding.    INDIVIDUALIZED CANCER RISK MANAGEMENT PLAN:  Individualized Surveillance Plan for women  With 20% or greater lifetime risk of breast cancer   Per NCCN Guidelines Version 1.2016   Recommended screening Test or procedure Last done Next Scheduled    Clinical encounter Ongoing risk assessment, risk reduction counseling and clinical breast exam, every 6-12 months.   5/26/2017 Nov. 2017   However, some family histories with breast cancers at a very young age, may warrant screening starting earlier.    *May begin at age 40 if breast cancers in the family occur at later ages.    Annual mammogram beginning 10 years younger than the earliest breast cancer in the family but not prior to age 30.    Recommend annual breast MRI to begin 10 years younger than the earliest breast cancer in the family but not prior to age 25.    Breast MRIs are preferably done on day 7-15 of the menstrual cycle in premenopausal women. 11/16/2016 - Tomosynthesis mammogram, [email or web address]    No breast MRI. Next MRI: 5/26/2017 after visit    Next tomosynthesis mammogram, 11/17/2017 following exam.           Breast screening for patients at high risk due to thoracic radiation between the ages of 10-30     Begin 8-10 years post radiation treatment or age 25.   Annual mammogram   and  Annual  breast MRI, preferably on day 7-15 of menstrual cycle for premenopausal women.    Annual clinical breast exams with ongoing risk assessment and risk reduction counseling until age 25, then every 6-12 months.   NA   NA     I will follow up on her screenings and see her in the Cancer Risk Management clinic in six months.    I spent 15 minutes with the patient with greater that 50% of it in counseling and coordinating care as documented above.    Portia Alvarez, APRN-CNS, OCN, ANG-BC  Clinical Nurse Specialist  Cancer Risk Management Program  [address]  [place] [address]    phone:  [phone]  Pager: [phone]  fax: [phone]    Cc: Primary , Unknown

## 2017-05-26 NOTE — PATIENT INSTRUCTIONS
Individualized Surveillance Plan for women  With 20% or greater lifetime risk of breast cancer   Per NCCN Guidelines Version 1.2016   Recommended screening Test or procedure Last done Next Scheduled    Clinical encounter Ongoing risk assessment, risk reduction counseling and clinical breast exam, every 6-12 months.   5/26/2017 Nov. 2017   However, some family histories with breast cancers at a very young age, may warrant screening starting earlier.    *May begin at age 40 if breast cancers in the family occur at later ages.    Annual mammogram beginning 10 years younger than the earliest breast cancer in the family but not prior to age 30.    Recommend annual breast MRI to begin 10 years younger than the earliest breast cancer in the family but not prior to age 25.    Breast MRIs are preferably done on day 7-15 of the menstrual cycle in premenopausal women. 11/16/2016 - Tomosynthesis mammogram, [email or web address]    No breast MRI. Next MRI: 5/26/2017 after visit    Next tomosynthesismammogram, 11/17/2017 following exam.           Breast screening for patients at high risk due to thoracic radiation between the ages of 10-30     Begin 8-10 years post radiation treatment or age 25.   Annual mammogram   and  Annual breast MRI, preferably on day 7-15 of menstrual cycle for premenopausal women.    Annual clinical breast exams with ongoing risk assessment and risk reduction counseling until age 25, then every 6-12 months.   NA   NA     Note: No screening imaging during pregnancy. Resume 6 mo. After cessation of breastfeeding.

## 2017-05-26 NOTE — LETTER
Cancer Risk Management  Program Locations    [facility]  [facility]  [facility]  [facility]  Mailing Address  Cancer Risk Management Program  [facility]  [address]  [place] [address]    New patient appointments  [phone]  May 26, 2017    Karolina Herrera  [address]  [facility] [address]      Dear Karolina,    It was a pleasure seeing you today.  Below is a copy of my note outlining your plan. Please let me know if you have any questions or concerns.  Have a great summer!    Oncology Risk Management Consultation:  Date on this visit: 5/26/2017    Karolina Herrera  returns to the clinic today for a six month follow up.  She requires heightened screening and surveillance for her higher risk of breast cancer, secondary to a  family history of invasive ductal carcinoma of the breast in her sister at age 39.  By report, her sister had BRCA1 and BRCA2 testing and was found to be negative.   Kraolina is considered to at high risk for breast cancer and has a 22.11% lifetime risk for breast cancer by the TIEN 6.0 model.     Primary Physician: Primary Dr, Unknown     History Of Present Illness:  Ms. Herrera is a very pleasant, healthy  [age] year old female who presents with family history of breast cancer in her sister at age 39.     Genetic testing:  No genetic testing to date. Her sister, who had invasive ductal carcinoma at age 39, by report, was negative for genetic mutations in the BRCA1 and BRCA2 genes and Karolina has not had genetic testing. She is considered to at high risk for breast cancer and has a 22.11% lifetime risk for breast cancer by the TIEN 6.0 model.      Pertinent health history:  Menarche at 12.  First child at age 35, conceived using one cycle of IVF.   Premenopausal.  Ovaries and uterus intact.   OCP use for one  year.  Hx of mastitis in 2016 with breastfeeding, resolved with antibiotics.  History of breastfeeding x8 months.  No history of breast screening.  No history of breast disease, atypia, malignancy.  No history of breast biopsy.  LMP: 5/11/2017; cycles about 35 days apart, lasting 4 days, heavy on 1st day or 2.     At this visit, she states she feels well and that she has been practicing self exams and denies breast fatigue, pain, tenderness, lumps, unusual nipple discharge, changes in skin or color of her breasts.     Past Medical/Surgical History:  Past Medical History:   Diagnosis Date     Abnormal vaginal bleeding 2008 and 2009     Past Surgical History:   Procedure Laterality Date     IVS      inveitro fertilization     LAPAROSCOPIC CHOLECYSTECTOMY N/A 11/7/2016    Procedure: LAPAROSCOPIC CHOLECYSTECTOMY;  Surgeon: Juan F Sherman MD;  Location: [facility]     NASAL/SINUS POLYPECTOMY       wisdom teeth removed         Allergies:  Allergies as of 05/26/2017     (No Known Allergies)       Current Medications:  Current Outpatient Prescriptions   Medication Sig Dispense Refill     sulfamethoxazole-trimethoprim (BACTRIM DS/SEPTRA DS) 800-160 MG per tablet Take 1 tablet by mouth 2 times daily 20 tablet 0     benzonatate (TESSALON) 100 MG capsule Take 1 capsule (100 mg) by mouth 3 times daily as needed for cough (Patient not taking: Reported on 4/29/2017) 16 capsule 0     albuterol (PROAIR HFA/PROVENTIL HFA/VENTOLIN HFA) 108 (90 BASE) MCG/ACT Inhaler Inhale 2 puffs into the lungs every 6 hours as needed for shortness of breath / dyspnea or wheezing (Patient not taking: Reported on 4/29/2017) 1 Inhaler 0     triamcinolone (KENALOG) 0.1 % cream Apply topically 1-2 times a day prn. 30 g 1     PRENATAL VIT W/ FE BISG-FA PO Reported on 4/29/2017       Ferrous Sulfate (IRON SUPPLEMENT PO) Take 325 mg by mouth Reported on 4/29/2017          Family History:  Family History   Problem Relation Age of Onset     Esophageal  Cancer Maternal Grandfather       in 60s; hx of smoking     Bladder Cancer Paternal Grandfather       in 80s     Breast Cancer Sister 39     invasive ductal carcinoma, treated with lumpectomy, radiation and tamoxifen     Breast Cancer Paternal Aunt      paternal great aunt in 70s       Social History:  Social History     Social History     Marital status:      Spouse name: Samantha     Number of children: N/A     Years of education: N/A     Occupational History           works for [employer]     Social History Main Topics     Smoking status: Never Smoker     Smokeless tobacco: Never Used     Alcohol use No     Drug use: No     Sexual activity: Yes     Partners: Female      Comment: pregnant via IVF, due in 2015     Other Topics Concern      Service No     Blood Transfusions No     Caffeine Concern No     Occupational Exposure No     Hobby Hazards No     Sleep Concern No     Stress Concern No     Weight Concern No     Special Diet No     Back Care No     Exercise Yes     walks dog 2-3 times/day; prenatal exercises     Seat Belt Yes     Self-Exams Yes     Social History Narrative       Physical Exam:  LMP 2017    GENERAL APPEARANCE: healthy, alert and no distress     NECK: no adenopathy, no asymmetry or masses     LYMPHATICS: No cervical, supraclavicular, axillary or inguinal lymphadenopathy     RESP: lungs clear to auscultation - no rales, rhonchi or wheezes     CARDIOVASCULAR: regular rate and rhythm, normal S1 S2, no S3 or S4 and no murmur.   BREASTS: Examined in a sitting and lying position. Symmetrical without visible distortion, swelling or rashes. No nipple inversion, nipple discharge, breast dimpling or puckering. Breast tissue is soft to fibrocystic to palpation. In the R breast at 6 oclock prominent fibroglandular tissue that is ropey, which recedes with touch.  Axillary area without masses or lymphadenopathy bilaterally.  SKIN: no suspicious lesions or  rj    Laboratory/Imaging Studies  Results for orders placed or performed in visit on 02/13/17   XR Chest 2 Views    Narrative    CHEST TWO VIEWS  2/13/2017 3:35 PM     HISTORY: Cough.    COMPARISON: None.      Impression    IMPRESSION: Negative chest. Lungs clear.    FABIEN PARIKH MD   Strep, Rapid Screen   Result Value Ref Range    Specimen Description Throat     Rapid Strep A Screen       NEGATIVE: No Group A streptococcal antigen detected by immunoassay, await   culture report.      Micro Report Status FINAL 02/13/2017    Beta strep group A culture   Result Value Ref Range    Specimen Description Throat     Culture Micro No Beta Streptococcus isolated     Micro Report Status FINAL 02/15/2017        ASSESSMENT  We discussed her last screening tests and her upcoming breast MRI.  She is doing well with her exercise plan and is training to do the [employer] women's triathlon later this year (swimming, biking 15 miles and running a 5K.) She is very active with her 2 year old son, Huy.  She states she eats 5-7 vegetables and fruits per day and has 1-2 alcoholic drinks per week.  She does not smoke.      She may have another cycle of IVF next winter. She has an understanding that no breast screening imaging would be done during pregnancy. The plan would change to resume routine screening 6 months after the cessation of breastfeeding.    INDIVIDUALIZED CANCER RISK MANAGEMENT PLAN:  Individualized Surveillance Plan for women  With 20% or greater lifetime risk of breast cancer   Per NCCN Guidelines Version 1.2016   Recommended screening Test or procedure Last done Next Scheduled    Clinical encounter Ongoing risk assessment, risk reduction counseling and clinical breast exam, every 6-12 months.   5/26/2017 Nov. 2017   However, some family histories with breast cancers at a very young age, may warrant screening starting earlier.    *May begin at age 40 if breast cancers in the family occur at later ages.    Annual  mammogram beginning 10 years younger than the earliest breast cancer in the family but not prior to age 30.    Recommend annual breast MRI to begin 10 years younger than the earliest breast cancer in the family but not prior to age 25.    Breast MRIs are preferably done on day 7-15 of the menstrual cycle in premenopausal women. 11/16/2016 - Tomosynthesis mammogram, [email or web address]    No breast MRI. Next MRI: 5/26/2017 after visit    Next tomosynthesis mammogram, 11/17/2017 following exam.           Breast screening for patients at high risk due to thoracic radiation between the ages of 10-30     Begin 8-10 years post radiation treatment or age 25.   Annual mammogram   and  Annual breast MRI, preferably on day 7-15 of menstrual cycle for premenopausal women.    Annual clinical breast exams with ongoing risk assessment and risk reduction counseling until age 25, then every 6-12 months.   BRAEDEN DERAS     I will follow up on her screenings and see her in the Cancer Risk Management clinic in six months.    I spent 15 minutes with the patient with greater that 50% of it in counseling and coordinating care as documented above.    Portia Alvarez, MIKEL-CNS, OCN, ANG-BC  Clinical Nurse Specialist  Cancer Risk Management Program  [address]  [place] [address]    phone:  [phone]  Pager: [phone]  fax: [phone]    Cc: Primary , Unknown

## 2017-05-26 NOTE — MR AVS SNAPSHOT
After Visit Summary   5/26/2017    Karolina Herrera    MRN: [id number]           Patient Information     Date Of Birth          1980        Visit Information        Provider Department      5/26/2017 2:30 PM Portia Alvarez APRN [facility]        Today's Diagnoses     Encounter for screening mammogram for high-risk patient    -  1    Family history of malignant neoplasm of breast        At high risk for breast cancer          Care Instructions    Individualized Surveillance Plan for women  With 20% or greater lifetime risk of breast cancer   Per NCCN Guidelines Version 1.2016   Recommended screening Test or procedure Last done Next Scheduled    Clinical encounter Ongoing risk assessment, risk reduction counseling and clinical breast exam, every 6-12 months.   5/26/2017 Nov. 2017   However, some family histories with breast cancers at a very young age, may warrant screening starting earlier.    *May begin at age 40 if breast cancers in the family occur at later ages.    Annual mammogram beginning 10 years younger than the earliest breast cancer in the family but not prior to age 30.    Recommend annual breast MRI to begin 10 years younger than the earliest breast cancer in the family but not prior to age 25.    Breast MRIs are preferably done on day 7-15 of the menstrual cycle in premenopausal women. 11/16/2016 - Tomosynthesis mammogram, [email or web address]    No breast MRI. Next MRI: 5/26/2017 after visit    Next tomosynthesismammogram, 11/17/2017 following exam.           Breast screening for patients at high risk due to thoracic radiation between the ages of 10-30     Begin 8-10 years post radiation treatment or age 25.   Annual mammogram   and  Annual breast MRI, preferably on day 7-15 of menstrual cycle for premenopausal women.    Annual clinical breast exams with ongoing risk assessment and risk reduction counseling until age 25, then every 6-12 months.   NA   NA      Note: No screening imaging during pregnancy. Resume 6 mo. After cessation of breastfeeding.          Follow-ups after your visit        Follow-up notes from your care team     Return in about 6 months (around 11/26/2017) for Physical Exam.      Your next 10 appointments already scheduled     Nov 17, 2017  3:30 PM CST   (Arrive by 3:15 PM)   Return Visit with MIKEL Richey   [facility] ([facility])    [address] [place] [id number] [phone]            Nov 17, 2017  4:15 PM CST   (Arrive by 4:00 PM)   MA SCREENING BILATERAL W/ ANGELA with [email or web address]   [facility] ([facility])    [address] [place] [id number] [phone]           Do not use any powder, lotion or deodorant under your arms or on your breast. If you do, we will ask you to remove it before your exam.  Wear comfortable, two-piece clothing.  If you have any allergies, tell your care team.  Bring any previous mammograms from other facilities or have them mailed to the breast center.              Future tests that were ordered for you today     Open Future Orders        Priority Expected Expires Ordered    MA Screen Bilateral w/Angela Routine 11/17/2017 5/27/2018 5/26/2017            Who to contact     If you have questions or need follow up information about today's clinic visit or your schedule please contact [facility] directly at [phone].  Normal or non-critical lab and imaging results will be communicated to you by MyChart, letter or phone within 4 business days after the clinic has received the results. If you do not hear from us within 7 days, please contact the clinic through [employer] or phone. If you have a critical or abnormal lab result, we will notify you by phone as soon as possible.  Submit refill requests through [employer] or call your pharmacy and they  "will forward the refill request to us. Please allow 3 business days for your refill to be completed.          Additional Information About Your Visit        [employer] Information     [employer] gives you secure access to your electronic health record. If you see a primary care provider, you can also send messages to your care team and make appointments. If you have questions, please call your primary care clinic.  If you do not have a primary care provider, please call [phone] and they will assist you.        Care EveryWhere ID     This is your Care EveryWhere ID. This could be used by other organizations to access your [place] medical records  [id number]        Your Vitals Were     Pulse Temperature Respirations Height Last Period Pulse Oximetry    89 97.7  F (36.5  C) (Oral) 18 1.702 m (5' 7\") 04/05/2017 96%    BMI (Body Mass Index)                   40.72 kg/m2            Blood Pressure from Last 3 Encounters:   05/26/17 113/75   04/29/17 102/68   02/07/17 120/86    Weight from Last 3 Encounters:   05/26/17 117.9 kg (260 lb)   04/29/17 116.6 kg (257 lb)   02/07/17 114.1 kg (251 lb 8 oz)                 Today's Medication Changes          These changes are accurate as of: 5/26/17  3:20 PM.  If you have any questions, ask your nurse or doctor.               Stop taking these medicines if you haven't already. Please contact your care team if you have questions.     sulfamethoxazole-trimethoprim 800-160 MG per tablet   Commonly known as:  BACTRIM DS/SEPTRA DS   Stopped by:  Portia Alvarez APRN CNS                    Primary Care Provider    Unknown Primary MD Zaki       No address on file        Thank you!     Thank you for choosing [facility]  for your care. Our goal is always to provide you with excellent care. Hearing back from our patients is one way we can continue to improve our services. Please take a few minutes to complete the written survey that you may receive in the mail " after your visit with us. Thank you!             Your Updated Medication List - Protect others around you: Learn how to safely use, store and throw away your medicines at www.disposemymeds.org.          This list is accurate as of: 5/26/17  3:20 PM.  Always use your most recent med list.                   Brand Name Dispense Instructions for use    IRON SUPPLEMENT PO      Take 325 mg by mouth Reported on 4/29/2017       PRENATAL VIT W/ FE BISG-FA PO      Reported on 4/29/2017

## 2017-07-26 NOTE — PROGRESS NOTES
SUBJECTIVE:                                                    Karolina Herrera is a 37 year old female who presents to clinic today for the following health issues:    Joint Pain    Onset: Beginning of July 2017    Description:   Location: Left foot   Character: Sharp    Intensity: moderate    Progression of Symptoms: same    Accompanying Signs & Symptoms:  Other symptoms: none    History:   Previous similar pain: no       Precipitating factors:   Trauma or overuse: no     Alleviating factors:  Improved by: None    Therapies Tried and outcome: Advil      Vaginal Symptoms  Onset: 1-2 weeks    Description:  Vaginal Discharge: none   Itching (Pruritis): YES  Burning sensation:  no   Odor: YES- sometime    Accompanying Signs & Symptoms:  Pain with Urination: no, but tissue feels very sensitive   Abdominal Pain: no   Fever: no     History:   History of recurrent BV in the past, not recent  Sexually active: YES  New Partner: no   Possibility of Pregnancy:  No    Precipitating factors:   Recent Antibiotic Use: no     Alleviating factors:  None    Therapies Tried and outcome: None      Questions/Concerns: Want to know if she is due for pap and she is due. Patient want to know if she can do pap today.     Patient is also here for routine pap smear  Last pap smear:  June 2014 - [facility] - normal  History of abnormal pap smears:  no   Vaginal bleeding:  YES- normal monthly periods  Vaginal itching or irritation: YES- see above  Vaginal discharge:  no   Urinary symptoms:  no   Sexually active:  YES  - in monogamous relationship with wife  STI testing:  no   Last mammogram:  Yes in Nov and 3D MRI in May- due to sister's BrCa diagnosis at age 39  [facility] cancer treatment preventative developed individualized plan - every 6 months alternating MRI and mammo    Problem list and histories reviewed & adjusted, as indicated.  Additional history: as documented    Reviewed and updated as needed this visit by clinical  staff     Reviewed and updated as needed this visit by Provider       ROS:  Constitutional, HEENT, cardiovascular, pulmonary, gi and gu systems are negative, except as otherwise noted.      OBJECTIVE:   /76  Pulse 83  Temp 97.7  F (36.5  C) (Oral)  Wt 266 lb 14.4 oz (121.1 kg)  SpO2 97%  BMI 41.8 kg/m2  Body mass index is 41.8 kg/(m^2).  GENERAL: healthy, alert and no distress  NECK: no adenopathy, no asymmetry, masses, or scars and thyroid normal to palpation  RESP: lungs clear to auscultation - no rales, rhonchi or wheezes  CV: regular rate and rhythm, normal S1 S2, no S3 or S4, no murmur, click or rub, no peripheral edema and peripheral pulses strong  ABDOMEN: soft, nontender, no hepatosplenomegaly, no masses and bowel sounds normal   (female): normal female external genitalia, normal urethral meatus, vaginal mucosa, normal cervix/adnexa/uterus without masses or discharge  MS: no gross deformities of feet, FROM and strength left foot and toes, pain to palpation localized at 3rd and 4th mid-metatarsal  SKIN: no suspicious lesions or rashes  NEURO: Normal strength and tone, mentation intact and speech normal  PSYCH: mentation appears normal, affect normal/bright  LYMPH: no cervical, supraclavicular, axillary, or inguinal adenopathy    Diagnostic Test Results:  Results for orders placed or performed in visit on 07/27/17   Wet prep   Result Value Ref Range    Specimen Description Vagina     Wet Prep (A)      Clue cells seen  No Trichomonas seen  No yeast seen      Micro Report Status FINAL 07/27/2017    XRAY - no fracture  Pap - pending    ASSESSMENT/PLAN:       (L29.8) Vaginal itching  (primary encounter diagnosis)  Comment:   Plan: Wet prep            (D22.9) Numerous moles  Comment:   Plan: DERMATOLOGY REFERRAL            (Z12.4) Screening for malignant neoplasm of cervix  Comment:   Plan: Pap imaged thin layer screen with HPV -         recommended age 30 - 65 years (select HPV order        below),  "HPV High Risk Types DNA Cervical            (M79.672) Left foot pain  Comment:   Plan: XR Foot Left G/E 3 Views              Patient Instructions   Today after you leave, please go to the [facility] RADIOLOGY for a chest xray.  To get there you   1.  Go to the first floor down the elevators  2.  Turn right and go to the end of the day  3.  Turn right through the door  4.  Turn right in the corridor to go toward the \"[facility]\"  5.  Go to the end of the long hallway  6.  Turn right to go to \"Diagnostic imaging/Radiology\"    You just need to show up - you do not need any paperwork          It was a pleasure to meet with you today.  Here is a list of suggestions that may help treat vaginal infections and may help maintain a healthy vaginal environment.    Many of these suggestions are for;   1. boosting your immune system so you can heal faster  2. changing the vaginal environment to a more acid state   3.  increasing the good healthy bacteria    Read through them and try the ones that seem to fit you and your life style.    Soak in a warm bath tub, no soap, no bubble bath and no oils.  Add one cup vinegar or lemon juice to bath water once in a while,     Keep a water bottle with a squirt top in your bathroom, fill with warm water and use as a spray after wiping, then pat dry.  May add 1-3 TBS vinegar to help maintain an acidic environment.    Wear cotton underwear; loose pants or skirt, no pantyhose.  No thong underwear.    Do not wear underwear to bed.  The vaginal environment needs to breathe.    Keep vaginal area dry, you can even use a hairdryer on cool setting after shower or bath    To boost your immune system increase daily intake of;  1. Rest  2. Fluids (2-3 quarts per day),   3. Foods such as nuts, grains, raw vegetables, yogurt, nancy, grapefruit, unsweetened cranberries and juices (8 oz daily)  4. Vitamin intake (if not pregnant)   Vitamin B complex 100mg   Calcium 1000mg   Magnesium " 500mg   Vitamin C 2-4 grams   Vitamin E 1,000 IU   Vitamin A 50,000 IU    Decrease daily intake of refined sugars, honey, red meat and alcohol    At each meal drink 1tsp apple cider vinegar and 1 tsp honey in   cup warm water    Acidophilus 4-5 TBS in one quart of water 3-4 times daily or as tablets 2-3 tabs 3-4 times a day    Apply plain yogurt externally to vaginal area, chamomile or chickweed cream - applied externally for relief of itching (may be found commercially).    Echinacea - 3 times a day for chronic problem or every 2 hours for acute symptoms    Take garlic daily, capsules or fresh.      Boric acid, insert 2 capsules  00  daily into vagina for seven days (found at most health food stores or co-ops)    If your symptoms do not resolve or if you have questions please call the clinic.          MIKEL Forrest [facility]

## 2017-07-27 ENCOUNTER — OFFICE VISIT (OUTPATIENT)
Dept: FAMILY MEDICINE | Facility: CLINIC | Age: 37
End: 2017-07-27
Payer: COMMERCIAL

## 2017-07-27 ENCOUNTER — HOSPITAL ENCOUNTER (OUTPATIENT)
Dept: GENERAL RADIOLOGY | Facility: CLINIC | Age: 37
Discharge: HOME | End: 2017-07-27
Attending: NURSE PRACTITIONER | Admitting: NURSE PRACTITIONER
Payer: COMMERCIAL

## 2017-07-27 VITALS
HEART RATE: 83 BPM | TEMPERATURE: 97.7 F | BODY MASS INDEX: 41.8 KG/M2 | OXYGEN SATURATION: 97 % | SYSTOLIC BLOOD PRESSURE: 110 MMHG | WEIGHT: 266.9 LBS | DIASTOLIC BLOOD PRESSURE: 76 MMHG

## 2017-07-27 DIAGNOSIS — M79.672 LEFT FOOT PAIN: ICD-10-CM

## 2017-07-27 DIAGNOSIS — Z12.4 SCREENING FOR MALIGNANT NEOPLASM OF CERVIX: ICD-10-CM

## 2017-07-27 DIAGNOSIS — N89.8 VAGINAL ITCHING: Primary | ICD-10-CM

## 2017-07-27 DIAGNOSIS — D22.9 NUMEROUS MOLES: ICD-10-CM

## 2017-07-27 LAB
MICRO REPORT STATUS: ABNORMAL
SPECIMEN SOURCE: ABNORMAL
WET PREP SPEC: ABNORMAL

## 2017-07-27 PROCEDURE — G0145 SCR C/V CYTO,THINLAYER,RESCR: HCPCS | Performed by: NURSE PRACTITIONER

## 2017-07-27 PROCEDURE — 73630 X-RAY EXAM OF FOOT: CPT | Mod: LT

## 2017-07-27 PROCEDURE — 87210 SMEAR WET MOUNT SALINE/INK: CPT | Performed by: NURSE PRACTITIONER

## 2017-07-27 PROCEDURE — 87624 HPV HI-RISK TYP POOLED RSLT: CPT | Performed by: NURSE PRACTITIONER

## 2017-07-27 PROCEDURE — 99214 OFFICE O/P EST MOD 30 MIN: CPT | Performed by: NURSE PRACTITIONER

## 2017-07-27 NOTE — NURSING NOTE
"Chief Complaint   Patient presents with     Vaginal Problem     Musculoskeletal Problem       Initial /76  Pulse 83  Temp 97.7  F (36.5  C) (Oral)  Wt 266 lb 14.4 oz (121.1 kg)  SpO2 97%  BMI 41.8 kg/m2 Estimated body mass index is 41.8 kg/(m^2) as calculated from the following:    Height as of 5/26/17: 5' 7\" (1.702 m).    Weight as of this encounter: 266 lb 14.4 oz (121.1 kg).  Medication Reconciliation: complete     Antonio King MA      "

## 2017-07-27 NOTE — MR AVS SNAPSHOT
"              After Visit Summary   7/27/2017    Karolina Herrera    MRN: [id number]           Patient Information     Date Of Birth          1980        Visit Information        Provider Department      7/27/2017 2:30 PM Jadyn Alvarez APRN [facility]        Today's Diagnoses     Vaginal itching    -  1    Numerous moles        Screening for malignant neoplasm of cervix        Left foot pain          Care Instructions    Today after you leave, please go to the [facility] RADIOLOGY for a chest xray.  To get there you   1.  Go to the first floor down the elevators  2.  Turn right and go to the end of the day  3.  Turn right through the door  4.  Turn right in the corridor to go toward the \"[facility]\"  5.  Go to the end of the long hallway  6.  Turn right to go to \"Diagnostic imaging/Radiology\"    You just need to show up - you do not need any paperwork          It was a pleasure to meet with you today.  Here is a list of suggestions that may help treat vaginal infections and may help maintain a healthy vaginal environment.    Many of these suggestions are for;   1. boosting your immune system so you can heal faster  2. changing the vaginal environment to a more acid state   3.  increasing the good healthy bacteria    Read through them and try the ones that seem to fit you and your life style.    Soak in a warm bath tub, no soap, no bubble bath and no oils.  Add one cup vinegar or lemon juice to bath water once in a while,     Keep a water bottle with a squirt top in your bathroom, fill with warm water and use as a spray after wiping, then pat dry.  May add 1-3 TBS vinegar to help maintain an acidic environment.    Wear cotton underwear; loose pants or skirt, no pantyhose.  No thong underwear.    Do not wear underwear to bed.  The vaginal environment needs to breathe.    Keep vaginal area dry, you can even use a hairdryer on cool setting after shower or bath    To boost your immune " system increase daily intake of;  1. Rest  2. Fluids (2-3 quarts per day),   3. Foods such as nuts, grains, raw vegetables, yogurt, nancy, grapefruit, unsweetened cranberries and juices (8 oz daily)  4. Vitamin intake (if not pregnant)   Vitamin B complex 100mg   Calcium 1000mg   Magnesium 500mg   Vitamin C 2-4 grams   Vitamin E 1,000 IU   Vitamin A 50,000 IU    Decrease daily intake of refined sugars, honey, red meat and alcohol    At each meal drink 1tsp apple cider vinegar and 1 tsp honey in   cup warm water    Acidophilus 4-5 TBS in one quart of water 3-4 times daily or as tablets 2-3 tabs 3-4 times a day    Apply plain yogurt externally to vaginal area, chamomile or chickweed cream - applied externally for relief of itching (may be found commercially).    Echinacea - 3 times a day for chronic problem or every 2 hours for acute symptoms    Take garlic daily, capsules or fresh.      Boric acid, insert 2 capsules  00  daily into vagina for seven days (found at most [employer] stores or co-ops)    If your symptoms do not resolve or if you have questions please call the clinic.              Follow-ups after your visit        Additional Services     DERMATOLOGY REFERRAL       Your provider has referred you to:   [facility]: [facility]  [place] [phone]   [email or web address]  [facility]: Dermatology Clinic [facility] [phone]   [email or web address]  N: [facility] [place] [phone]  [email or web address]  [facility]: [facility] [place] [phone]   [email or web address]  N: [facility] - [place] [phone]   [email or web address]    Please be aware that coverage of these services is subject to the terms and limitations of your health insurance plan.  Call member services at your health plan with any benefit or coverage  "questions.      Please bring the following with you to your appointment:    (1) Any X-Rays, CTs or MRIs which have been performed.  Contact the facility where they were done to arrange for  prior to your scheduled appointment.    (2) List of current medications  (3) This referral request   (4) Any documents/labs given to you for this referral                  Your next 10 appointments already scheduled     Nov 17, 2017  3:30 PM CST   (Arrive by 3:15 PM)   Return Visit with MIKEL Richey [facility] ([facility])    [address] [place] [id number] [phone]            Nov 17, 2017  4:15 PM CST   (Arrive by 4:00 PM)   MA SCREENING BILATERAL W/ ANGELA with [id number]   [facility] ([facility])    [address] [place] [address] [id number] [phone]           Do not use any powder, lotion or deodorant under your arms or on your breast. If you do, we will ask you to remove it before your exam.  Wear comfortable, two-piece clothing.  If you have any allergies, tell your care team.  Bring any previous mammograms from other facilities or have them mailed to the breast center.  Three-dimensional (3D) mammograms are available at [place] locations in [facility], and [place]. [facility] locations include [place] [facility] in [place].   Benefits of 3D mammograms include: - Improved rate of cancer detection - Decreases your chance of having to go back for more tests, which means fewer: - \"False-positive\" results (This means that there is an abnormal area but it isn't cancer.) - Invasive testing procedures, such as a biopsy or surgery - Can provide clearer images of the breast if you have dense breast tissue. 3D mammography is an optional exam that anyone can have with a 2D mammogram. It doesn't replace " or take the place of a 2D mammogram. 2D mammograms remain an effective screening test for all women.  Not all insurance companies cover the cost of a 3D mammogram. Check with your insurance.              Future tests that were ordered for you today     Open Future Orders        Priority Expected Expires Ordered    XR Foot Left G/E 3 Views Routine 7/27/2017 7/27/2018 7/27/2017            Who to contact     If you have questions or need follow up information about today's clinic visit or your schedule please contact [facility] directly at [phone].  Normal or non-critical lab and imaging results will be communicated to you by [email or web address], letter or phone within 4 business days after the clinic has received the results. If you do not hear from us within 7 days, please contact the clinic through [employer] or phone. If you have a critical or abnormal lab result, we will notify you by phone as soon as possible.  Submit refill requests through [employer] or call your pharmacy and they will forward the refill request to us. Please allow 3 business days for your refill to be completed.          Additional Information About Your Visit        [employer] Information     [employer] gives you secure access to your electronic health record. If you see a primary care provider, you can also send messages to your care team and make appointments. If you have questions, please call your primary care clinic.  If you do not have a primary care provider, please call [phone] and they will assist you.        Care EveryWhere ID     This is your Care EveryWhere ID. This could be used by other organizations to access your [place] medical records  [id number]        Your Vitals Were     Pulse Temperature Pulse Oximetry BMI (Body Mass Index)          83 97.7  F (36.5  C) (Oral) 97% 41.8 kg/m2         Blood Pressure from Last 3 Encounters:   07/27/17 110/76   05/26/17 113/75   04/29/17 102/68    Weight from Last 3 Encounters:    07/27/17 266 lb 14.4 oz (121.1 kg)   05/26/17 260 lb (117.9 kg)   04/29/17 257 lb (116.6 kg)              We Performed the Following     DERMATOLOGY REFERRAL     HPV High Risk Types DNA Cervical     Pap imaged thin layer screen with HPV - recommended age 30 - 65 years (select HPV order below)     Wet prep        Primary Care Provider    Unknown Primary MD Zaki       No address on file        Equal Access to Services     [facility]: Hadii aad ku hadasho Soomaali, waaxda luqadaha, qaybta kaalmada adeegyada, waxay maurizioin reinan manjeetteresita lobo laYuvalangelique . So [facility] [phone].    ATENCIÓN: Si habla margy, tiene a garcia disposición servicios gratuitos de asistencia lingüística. Llame al [phone].    We comply with applicable federal civil rights laws and [place] laws. We do not discriminate on the basis of race, color, national origin, age, disability sex, sexual orientation or gender identity.            Thank you!     Thank you for choosing [facility]  for your care. Our goal is always to provide you with excellent care. Hearing back from our patients is one way we can continue to improve our services. Please take a few minutes to complete the written survey that you may receive in the mail after your visit with us. Thank you!             Your Updated Medication List - Protect others around you: Learn how to safely use, store and throw away your medicines at www.disposemymeds.org.          This list is accurate as of: 7/27/17  3:32 PM.  Always use your most recent med list.                   Brand Name Dispense Instructions for use Diagnosis    IRON SUPPLEMENT PO      Take 325 mg by mouth Reported on 4/29/2017        PRENATAL VIT W/ FE BISG-FA PO      Reported on 4/29/2017

## 2017-07-27 NOTE — PROGRESS NOTES
Karolina,    You really know your body - it is BV!  I'll take your lead from here.  As I said I usually either recommend vaginal gel (5 nights) or doing nothing.  There is the oral antibiotic (same medication as the vaginal gel, but 10 days) option as well.  I don't have any reason other than your history of recurrence to think we would need to do anything differently and bodies do change after pregnancies so it's possible it would not go that direction.  However because your sequelae has been so severe, if you prefer to be more aggressive, that would be ok with me.  Let me know what you think. If you have any questions, please feel free to contact the clinic.    CHARLETTE Stafford

## 2017-07-27 NOTE — PATIENT INSTRUCTIONS
"Today after you leave, please go to the [facility] RADIOLOG[facility] for a chest xray.  To get there you   1.  Go to the first floor down the elevators  2.  Turn right and go to the end of the day  3.  Turn right through the door  4.  Turn right in the corridor to go toward the \"[facility]\"  5.  Go to the end of the long hallway  6.  Turn right to go to \"Diagnostic imaging/Radiology\"    You just need to show up - you do not need any paperwork          It was a pleasure to meet with you today.  Here is a list of suggestions that may help treat vaginal infections and may help maintain a healthy vaginal environment.    Many of these suggestions are for;   1. boosting your immune system so you can heal faster  2. changing the vaginal environment to a more acid state   3.  increasing the good healthy bacteria    Read through them and try the ones that seem to fit you and your life style.    Soak in a warm bath tub, no soap, no bubble bath and no oils.  Add one cup vinegar or lemon juice to bath water once in a while,     Keep a water bottle with a squirt top in your bathroom, fill with warm water and use as a spray after wiping, then pat dry.  May add 1-3 TBS vinegar to help maintain an acidic environment.    Wear cotton underwear; loose pants or skirt, no pantyhose.  No thong underwear.    Do not wear underwear to bed.  The vaginal environment needs to breathe.    Keep vaginal area dry, you can even use a hairdryer on cool setting after shower or bath    To boost your immune system increase daily intake of;  1. Rest  2. Fluids (2-3 quarts per day),   3. Foods such as nuts, grains, raw vegetables, yogurt, nancy, grapefruit, unsweetened cranberries and juices (8 oz daily)  4. Vitamin intake (if not pregnant)   Vitamin B complex 100mg   Calcium 1000mg   Magnesium 500mg   Vitamin C 2-4 grams   Vitamin E 1,000 IU   Vitamin A 50,000 IU    Decrease daily intake of refined sugars, honey, red meat and alcohol    At each meal " drink 1tsp apple cider vinegar and 1 tsp honey in   cup warm water    Acidophilus 4-5 TBS in one quart of water 3-4 times daily or as tablets 2-3 tabs 3-4 times a day    Apply plain yogurt externally to vaginal area, chamomile or chickweed cream - applied externally for relief of itching (may be found commercially).    Echinacea - 3 times a day for chronic problem or every 2 hours for acute symptoms    Take garlic daily, capsules or fresh.      Boric acid, insert 2 capsules  00  daily into vagina for seven days (found at most health food stores or co-ops)    If your symptoms do not resolve or if you have questions please call the clinic.

## 2017-07-28 ENCOUNTER — MYC MEDICAL ADVICE (OUTPATIENT)
Dept: FAMILY MEDICINE | Facility: CLINIC | Age: 37
End: 2017-07-28

## 2017-07-28 DIAGNOSIS — B96.89 BV (BACTERIAL VAGINOSIS): Primary | ICD-10-CM

## 2017-07-28 DIAGNOSIS — N76.0 BV (BACTERIAL VAGINOSIS): Primary | ICD-10-CM

## 2017-07-28 RX ORDER — METRONIDAZOLE 7.5 MG/G
1 GEL VAGINAL AT BEDTIME
Qty: 70 G | Refills: 0 | Status: SHIPPED | OUTPATIENT
Start: 2017-07-28 | End: 2017-09-05

## 2017-07-28 NOTE — PROGRESS NOTES
Karolina,    Your foot xray is normal - no fracture.  I still suspect shoes.  If you have any questions, please feel free to contact the clinic.    CHARLETTE Stafford

## 2017-07-31 LAB
COPATH REPORT: NORMAL
PAP: NORMAL

## 2017-08-01 LAB
FINAL DIAGNOSIS: NORMAL
HPV HR 12 DNA CVX QL NAA+PROBE: NEGATIVE
HPV16 DNA SPEC QL NAA+PROBE: NEGATIVE
HPV18 DNA SPEC QL NAA+PROBE: NEGATIVE
SPECIMEN DESCRIPTION: NORMAL

## 2017-09-05 ENCOUNTER — MYC MEDICAL ADVICE (OUTPATIENT)
Dept: FAMILY MEDICINE | Facility: CLINIC | Age: 37
End: 2017-09-05

## 2017-09-05 DIAGNOSIS — N76.0 BV (BACTERIAL VAGINOSIS): ICD-10-CM

## 2017-09-05 DIAGNOSIS — B96.89 BV (BACTERIAL VAGINOSIS): ICD-10-CM

## 2017-09-05 NOTE — TELEPHONE ENCOUNTER
Georgie -- please see pt request below and sign/advise.    Thank you  JENA BrownN, RN  [facility]

## 2017-09-06 RX ORDER — METRONIDAZOLE 7.5 MG/G
1 GEL VAGINAL AT BEDTIME
Qty: 70 G | Refills: 0 | Status: SHIPPED | OUTPATIENT
Start: 2017-09-06 | End: 2018-05-03

## 2017-10-17 ENCOUNTER — TELEPHONE (OUTPATIENT)
Dept: FAMILY MEDICINE | Facility: CLINIC | Age: 37
End: 2017-10-17

## 2017-10-17 NOTE — TELEPHONE ENCOUNTER
I received a call from Samantha spouse of Karolina asking why the referral request to [facility] is not covered. I explained that they are not contracted by her present plan with [place] as her primary and we can redirect to a plan covered provider for services. They may go anyway to [place] knowing they are responsible for visit and then decide what way they will proceed. To call back if they have any other concerns .       Cookie Brownlee Referral Rep

## 2017-10-28 DIAGNOSIS — Z12.31 ENCOUNTER FOR SCREENING MAMMOGRAM FOR HIGH-RISK PATIENT: ICD-10-CM

## 2017-10-28 DIAGNOSIS — Z31.41 FERTILITY TESTING: ICD-10-CM

## 2017-10-28 DIAGNOSIS — Z80.3 FAMILY HISTORY OF MALIGNANT NEOPLASM OF BREAST: ICD-10-CM

## 2017-10-28 DIAGNOSIS — Z91.89 AT HIGH RISK FOR BREAST CANCER: ICD-10-CM

## 2017-10-28 DIAGNOSIS — N92.6 IRREGULAR MENSTRUAL CYCLE: ICD-10-CM

## 2017-10-28 DIAGNOSIS — E28.2 PCOS (POLYCYSTIC OVARIAN SYNDROME): ICD-10-CM

## 2017-10-28 LAB
ABO + RH BLD: NORMAL
ABO + RH BLD: NORMAL
ESTRADIOL SERPL-MCNC: 16 PG/ML
FSH SERPL-ACNC: 6.1 IU/L
SPECIMEN EXP DATE BLD: NORMAL
TSH SERPL DL<=0.005 MIU/L-ACNC: 1.87 MU/L (ref 0.4–4)

## 2017-10-28 PROCEDURE — 86762 RUBELLA ANTIBODY: CPT | Performed by: NURSE PRACTITIONER

## 2017-10-28 PROCEDURE — 87340 HEPATITIS B SURFACE AG IA: CPT | Performed by: NURSE PRACTITIONER

## 2017-10-28 PROCEDURE — 83520 IMMUNOASSAY QUANT NOS NONAB: CPT | Performed by: NURSE PRACTITIONER

## 2017-10-28 PROCEDURE — 83001 ASSAY OF GONADOTROPIN (FSH): CPT | Performed by: NURSE PRACTITIONER

## 2017-10-28 PROCEDURE — 86803 HEPATITIS C AB TEST: CPT | Performed by: NURSE PRACTITIONER

## 2017-10-28 PROCEDURE — 84443 ASSAY THYROID STIM HORMONE: CPT | Performed by: NURSE PRACTITIONER

## 2017-10-28 PROCEDURE — 87389 HIV-1 AG W/HIV-1&-2 AB AG IA: CPT | Performed by: NURSE PRACTITIONER

## 2017-10-28 PROCEDURE — 36415 COLL VENOUS BLD VENIPUNCTURE: CPT | Performed by: NURSE PRACTITIONER

## 2017-10-28 PROCEDURE — 86901 BLOOD TYPING SEROLOGIC RH(D): CPT | Performed by: NURSE PRACTITIONER

## 2017-10-28 PROCEDURE — 86900 BLOOD TYPING SEROLOGIC ABO: CPT | Performed by: NURSE PRACTITIONER

## 2017-10-28 PROCEDURE — 82670 ASSAY OF TOTAL ESTRADIOL: CPT | Performed by: NURSE PRACTITIONER

## 2017-10-29 LAB — RUBV IGM SER QL: <0.2 AI (ref 0–0.8)

## 2017-10-30 LAB
HBV SURFACE AG SERPL QL IA: NONREACTIVE
HCV AB SERPL QL IA: NONREACTIVE
HIV 1+2 AB+HIV1 P24 AG SERPL QL IA: NONREACTIVE

## 2017-10-31 ENCOUNTER — RADIANT APPOINTMENT (OUTPATIENT)
Dept: ULTRASOUND IMAGING | Facility: CLINIC | Age: 37
End: 2017-10-31
Attending: NURSE PRACTITIONER
Payer: COMMERCIAL

## 2017-10-31 PROCEDURE — 76830 TRANSVAGINAL US NON-OB: CPT | Performed by: OBSTETRICS & GYNECOLOGY

## 2017-11-01 LAB — MIS SERPL-MCNC: 3.62 NG/ML (ref 0.18–11.71)

## 2017-11-17 ENCOUNTER — ONCOLOGY VISIT (OUTPATIENT)
Dept: ONCOLOGY | Facility: CLINIC | Age: 37
End: 2017-11-17
Attending: CLINICAL NURSE SPECIALIST
Payer: COMMERCIAL

## 2017-11-17 VITALS — DIASTOLIC BLOOD PRESSURE: 84 MMHG | HEART RATE: 78 BPM | TEMPERATURE: 99 F | SYSTOLIC BLOOD PRESSURE: 138 MMHG

## 2017-11-17 DIAGNOSIS — Z12.39 ENCOUNTER FOR BREAST CANCER SCREENING OTHER THAN MAMMOGRAM: Primary | ICD-10-CM

## 2017-11-17 DIAGNOSIS — Z80.3 FAMILY HISTORY OF MALIGNANT NEOPLASM OF BREAST: ICD-10-CM

## 2017-11-17 DIAGNOSIS — Z91.89 AT HIGH RISK FOR BREAST CANCER: ICD-10-CM

## 2017-11-17 PROCEDURE — 99212 OFFICE O/P EST SF 10 MIN: CPT | Mod: ZF

## 2017-11-17 PROCEDURE — 99213 OFFICE O/P EST LOW 20 MIN: CPT | Mod: ZP | Performed by: CLINICAL NURSE SPECIALIST

## 2017-11-17 ASSESSMENT — PAIN SCALES - GENERAL: PAINLEVEL: NO PAIN (0)

## 2017-11-17 NOTE — PATIENT INSTRUCTIONS
Individualized Surveillance Plan for women  With 20% or greater lifetime risk of breast cancer   Per NCCN Breast Cancer Screening and Diagnosis Guidelines Version 1.2017    Recommended screening Test or procedure Last done Next Scheduled    Clinical encounter Ongoing risk assessment, risk reduction counseling and clinical breast exam, every 6-12 months.   11/17/2017 November 2018   However, some family histories with breast cancers at a very young age, may warrant screening starting earlier.    *May begin at age 40 if breast cancers in the family occur at later ages.    Annual mammogram beginning 10 years younger than the earliest breast cancer in the family but not prior to age 30.    Recommend annual breast MRI to begin 10 years younger than the earliest breast cancer in the family but not prior to age 25.    Breast MRIs are preferably done on day 7-15 of the menstrual cycle in premenopausal women.   5/26/2017 - Breast MRI, BiRads1    11/16/2016 - Screening mammogram, BiRads1   NEXT: 11/17/2017- Tomosynthesis mammogram today    Next Breast MRI: May 2018   Breast screening for patients at high risk due to thoracic radiation between the ages of 10-30     Begin 8-10 years post radiation treatment or age 25.   Annual mammogram   and  Annual breast MRI, preferably on day 7-15 of menstrual cycle for premenopausal women.    Annual clinical breast exams with ongoing risk assessment and risk reduction counseling until age 25, then every 6-12 months.     NA     NA

## 2017-11-17 NOTE — NURSING NOTE
"Oncology Rooming Note    November 17, 2017 3:59 PM   Karolina Herrera is a 37 year old female who presents for:    Chief Complaint   Patient presents with     Oncology Clinic Visit     Return for [facility] Risk Breast Ca      Initial Vitals: /84  Pulse 78  Temp 99  F (37.2  C) (Oral) Estimated body mass index is 41.8 kg/(m^2) as calculated from the following:    Height as of 5/26/17: 1.702 m (5' 7\").    Weight as of 7/27/17: 121.1 kg (266 lb 14.4 oz). There is no height or weight on file to calculate BSA.  No Pain (0) Comment: Data Unavailable   No LMP recorded.  Allergies reviewed: Yes  Medications reviewed: Yes    Medications: Medication refills not needed today.  Pharmacy name entered into [employer]:    [facility] [id number] [place], [place] - [address] [place] [address] DRUG STORE [address] [place] [address] AT [address] [address] [place] [address]    Clinical concerns: results  Kim was notified.    6  minutes for nursing intake (face to face time)     Megan Borrero MA              "

## 2017-11-17 NOTE — LETTER
Cancer Risk Management  Program Locations    [facility]  [facility]  [facility] Cancer [facility]  Mailing Address  Cancer Risk Management Program  [facility]  [address]  [place] [address]    New patient appointments  [phone]  November 17, 2017    Karolina Herrera  [address]  [place] [facility] [address]      Dear Karolina,    Always a pleasure seeing you. Below is a copy of my note from our visit, outlining your plan.    Oncology Risk Management Consultation:  Date on this visit: 11/17/2017    Karolina Herrera  returns to the [facility] today for a six month follow up.  She requires heightened screening and surveillance for her higher risk of breast cancer, secondary to a  family history of invasive ductal carcinoma of the breast in her sister at age 39.      By report, her sister had BRCA1 and BRCA2 testing and was found to be negative.       Karolina is considered to at high risk for breast cancer and has a 23.1% lifetime risk for breast cancer by the TIEN model.     Primary Physician: MIKEL Lopez NP    History Of Present Illness:  Ms. Herrera is a very pleasant, healthy  37 year old female who presents with family history of breast cancer.     Genetic testing:  No genetic testing to date. Her sister, who had invasive ductal carcinoma at age 39, by report, was negative for genetic mutations in the BRCA1 and BRCA2 genes and Karolina has not had genetic testing.     Pertinent health history:  Menarche at 12.  First child at age 35, conceived using one cycle of IVF.   Premenopausal.  Ovaries and uterus intact.   OCP use for one year.  Hx of mastitis in 2016 with breastfeeding, resolved with antibiotics.  History of breastfeeding x8 months.  No history of breast screening.  No history of breast disease, atypia, malignancy.  No  history of breast biopsy.  Polycystic ovarian syndrome    At this visit, she denies asymmetry, lumps, masses, thickening, nipple discharge and skin changes.  She states her breast seem more sensitive to discomfort than they did before she gave birth; that if she feels pressure or sustains a bump, the pain lasts longer and seems more intense.    Past Medical/Surgical History:  Past Medical History:   Diagnosis Date     Abnormal vaginal bleeding  and      At high risk for breast cancer      Past Surgical History:   Procedure Laterality Date     IVS      invitro fertilization     LAPAROSCOPIC CHOLECYSTECTOMY N/A 2016    Procedure: LAPAROSCOPIC CHOLECYSTECTOMY;  Surgeon: Juan F Sherman MD;  Location: [facility] OR     NASAL/SINUS POLYPECTOMY       wisdom teeth removed         Allergies:  Allergies as of 2017     (No Known Allergies)       Current Medications:  Current Outpatient Prescriptions   Medication Sig Dispense Refill     metroNIDAZOLE (METROGEL) 0.75 % vaginal gel Place 1 applicator (5 g) vaginally At Bedtime 70 g 0     PRENATAL VIT W/ FE BISG-FA PO Reported on 2017       Ferrous Sulfate (IRON SUPPLEMENT PO) Take 325 mg by mouth Reported on 2017          Family History:  Family History   Problem Relation Age of Onset     Esophageal Cancer Maternal Grandfather       in 60s; hx of smoking     Bladder Cancer Paternal Grandfather       in 80s     Breast Cancer Sister 39     invasive ductal carcinoma, treated with lumpectomy, radiation and tamoxifen     Breast Cancer Paternal Aunt      paternal great aunt in 70s       Social History:  Social History     Social History     Marital status:      Spouse name: Samantha     Number of children: 1     Years of education: N/A     Occupational History           works for [employer]     Social History Main Topics     Smoking status: Never Smoker     Smokeless tobacco: Never Used     Alcohol use Yes      Comment: 1-2 per  week.     Drug use: No     Sexual activity: Yes     Partners: Female     Other Topics Concern      Service No     Blood Transfusions No     Caffeine Concern No     Occupational Exposure No     Hobby Hazards No     Sleep Concern No     Stress Concern No     Weight Concern No     Special Diet No     Back Care No     Exercise Yes     walks dog 2-3 times/day; prenatal exercises     Seat Belt Yes     Self-Exams Yes     Social History Narrative       Physical Exam:  /84  Pulse 78  Temp 99  F (37.2  C) (Oral)    GENERAL APPEARANCE: healthy, alert and no distress     NECK: no adenopathy, no asymmetry or masses     LYMPHATICS: No cervical, supraclavicular or axillary lymphadenopathy     RESP: lungs clear to auscultation - no rales, rhonchi or wheezes     CARDIOVASCULAR: regular rate and rhythm, normal S1 S2, no S3 or S4 and no murmur.   BREASTS: Examined in a sitting and lying position. Symmetrical without visible distortion, swelling or rashes. No nipple inversion, nipple discharge, breast dimpling or puckering. Breast tissue is soft to fibrocystic to palpation.  Axillary area without masses or lymphadenopathy bilaterally.       SKIN: no suspicious lesions or rashes    Laboratory/Imaging Studies  Results for orders placed or performed in visit on 10/28/17   US Pelvic Complete w Transvaginal    Narrative    US Pelvic Complete w Transvaginal    Order #: [id number] Accession #: [id number]         Study Notes        Lotus Lewis on 10/31/2017  7:27 AM     Gynecological Ultrasound Report  Pelvic U/S - Follicle Study - Transvaginal  [facility]  Referring physician: Jadyn Zuleta APRN CNP  Sonographer: Lotus Lewis RDMS  Indication:  Antral follicle count  LMP: 10/25/2017  History: no infertility meds, baseline study, hx PCOS  Follicle Study:   Cycle Day: 7  Uterus: 6.2 x 4.0 x 5.2 cm  Endometrium: Thickness total 3.2mm   Right Ovary: 2.0 x 2.6 x 1.3cm.    Follicles: > 10 follicles 3-5 mm  range  Left Ovary: 2.9 x 3.1 x 1.7 cm.    Follicles: > 8 follicles 4-6 mm range  Cul de Sac/Pouch of Ziggy: no free fluid     Impression:   The uterus and ovaries were visualized and no abnormalities were seen.  Both ovaries have mulitple small follicles around the periphery,   consistent with the appearance of polycystic ovaries. At least 18 small   follicle were seen today.  Recommend clinical correlation.     LAURA PHELAN                    Follicle stimulating hormone   Result Value Ref Range    FSH 6.1 IU/L   Estradiol   Result Value Ref Range    Estradiol 16 pg/mL   Anti-Mullerian hormone   Result Value Ref Range    Anti-Mullerian Hormone 3.616 0.176 - 11.705 ng/mL   TSH with free T4 reflex   Result Value Ref Range    TSH 1.87 0.40 - 4.00 mU/L   Rubella antibody IgM   Result Value Ref Range    Rubella Antibody IgM <0.2 0.0 - 0.8 AI   Hepatitis B surface antigen   Result Value Ref Range    Hep B Surface Agn Nonreactive NR^Nonreactive   Hepatitis C antibody   Result Value Ref Range    Hepatitis C Antibody Nonreactive NR^Nonreactive   HIV Antigen Antibody Combo   Result Value Ref Range    HIV Antigen Antibody Combo Nonreactive NR^Nonreactive       ABO and Rh   Result Value Ref Range    ABO O     RH(D) Pos     Specimen Expires 10/31/2017        ASSESSMENT  We discussed her screening plan. She is having her mammogram appointment in 15 minutes.     Karolina is considering another round of IVF and wants to have another baby in a year or two. She may be starting IVF again in the Spring. We discussed that if this were the case, she should defer routine screening during IVF, as her breasts will be undergoing changes due to the treatments.     Of course, during IVF treatment and subsequent pregnancy, any breast concerns such as lumps, masses, changes, nipple discharge etc. Should be reported immediately and I would be happy to ensure that she has appropriate exam and follow up.       At this time, she will proceed  with the following plan.    INDIVIDUALIZED CANCER RISK MANAGEMENT PLAN:  Individualized Surveillance Plan for women  With 20% or greater lifetime risk of breast cancer   Per NCCN Breast Cancer Screening and Diagnosis Guidelines Version 1.2017    Recommended screening Test or procedure Last done Next Scheduled    Clinical encounter Ongoing risk assessment, risk reduction counseling and clinical breast exam, every 6-12 months.   11/17/2017 November 2018   However, some family histories with breast cancers at a very young age, may warrant screening starting earlier.    *May begin at age 40 if breast cancers in the family occur at later ages.    Annual mammogram beginning 10 years younger than the earliest breast cancer in the family but not prior to age 30.    Recommend annual breast MRI to begin 10 years younger than the earliest breast cancer in the family but not prior to age 25.    Breast MRIs are preferably done on day 7-15 of the menstrual cycle in premenopausal women.   5/26/2017 - Breast MRI, [email or web address]    11/16/2016 - Screening mammogram, [email or web address]   NEXT: 11/17/2017- Tomosynthesis mammogram today    Next Breast MRI: Nov. 2018 (no earlier than 11/17/2018)   Breast screening for patients at high risk due to thoracic radiation between the ages of 10-30     Begin 8-10 years post radiation treatment or age 25.   Annual mammogram   and  Annual breast MRI, preferably on day 7-15 of menstrual cycle for premenopausal women.    Annual clinical breast exams with ongoing risk assessment and risk reduction counseling until age 25, then every 6-12 months.     NA     NA       I will see her in one year.    I spent 15 minutes with the patient with greater that 50% of it in counseling and coordinating care as documented above.    Portia Alvarez, APRN-CNS, OCN, ANG-BC  Clinical Nurse Specialist  Cancer Risk Management Program  [address]  [place]  [address]    phone:  [phone]  Pager: [phone]  fax: [phone]    Cc: PELON Lopez

## 2017-11-17 NOTE — MR AVS SNAPSHOT
After Visit Summary   11/17/2017    Karolina Herrera    MRN: [id number]           Patient Information     Date Of Birth          1980        Visit Information        Provider Department      11/17/2017 3:30 PM Portia Alvarez APRN [facility]        Today's Diagnoses     Encounter for breast cancer screening other than mammogram    -  1    Family history of malignant neoplasm of breast        At high risk for breast cancer          Care Instructions    Individualized Surveillance Plan for women  With 20% or greater lifetime risk of breast cancer   Per NCCN Breast Cancer Screening and Diagnosis Guidelines Version 1.2017    Recommended screening Test or procedure Last done Next Scheduled    Clinical encounter Ongoing risk assessment, risk reduction counseling and clinical breast exam, every 6-12 months.   11/17/2017 November 2018   However, some family histories with breast cancers at a very young age, may warrant screening starting earlier.    *May begin at age 40 if breast cancers in the family occur at later ages.    Annual mammogram beginning 10 years younger than the earliest breast cancer in the family but not prior to age 30.    Recommend annual breast MRI to begin 10 years younger than the earliest breast cancer in the family but not prior to age 25.    Breast MRIs are preferably done on day 7-15 of the menstrual cycle in premenopausal women.   5/26/2017 - Breast MRI, BiRads1    11/16/2016 - Screening mammogram, BiRads1   NEXT: 11/17/2017- Tomosynthesis mammogram today    Next Breast MRI: May 2018   Breast screening for patients at high risk due to thoracic radiation between the ages of 10-30     Begin 8-10 years post radiation treatment or age 25.   Annual mammogram   and  Annual breast MRI, preferably on day 7-15 of menstrual cycle for premenopausal women.    Annual clinical breast exams with ongoing risk assessment and risk reduction counseling until  age 25, then every 6-12 months.     NA     NA               Follow-ups after your visit        Follow-up notes from your care team     Return in about 1 year (around 11/17/2018) for Physical Exam.      Future tests that were ordered for you today     Open Future Orders        Priority Expected Expires Ordered    MR Breast Bilateral w/o & w Contrast Routine 5/27/2018 11/18/2018 11/17/2017            Who to contact     If you have questions or need follow up information about today's clinic visit or your schedule please contact [facility] directly at [phone].  Normal or non-critical lab and imaging results will be communicated to you by [employer], letter or phone within 4 business days after the clinic has received the results. If you do not hear from us within 7 days, please contact the clinic through [employer] or phone. If you have a critical or abnormal lab result, we will notify you by phone as soon as possible.  Submit refill requests through [employer] or call your pharmacy and they will forward the refill request to us. Please allow 3 business days for your refill to be completed.          Additional Information About Your Visit        MyChart Information     [employer] gives you secure access to your electronic health record. If you see a primary care provider, you can also send messages to your care team and make appointments. If you have questions, please call your primary care clinic.  If you do not have a primary care provider, please call [phone] and they will assist you.        Care EveryWhere ID     This is your Care EveryWhere ID. This could be used by other organizations to access your [facility] medical records  [id number]        Your Vitals Were     Pulse Temperature                78 99  F (37.2  C) (Oral)           Blood Pressure from Last 3 Encounters:   11/17/17 138/84   07/27/17 110/76   05/26/17 113/75    Weight from Last 3 Encounters:   07/27/17 121.1 kg (266 lb 14.4 oz)    05/26/17 117.9 kg (260 lb)   04/29/17 116.6 kg (257 lb)               Primary Care Provider Office Phone # Fax #    MIKEL Lopez [facility] [phone]       [facility] [address] [facility] [address]  [facility] [address]        Equal Access to Services     JUNAID CUNNINGHAM : Hadii aad ku hadasho Soomaali, waaxda luqadaha, qaybta kaalmada adeegyada, waxay idiin hayaan adeeg kharash la'aan . So [facility] [phone].    ATENCIÓN: Si habla español, tiene a garcia disposición servicios gratuitos de asistencia lingüística. Llame al [phone].    We comply with applicable federal civil rights laws and [place] laws. We do not discriminate on the basis of race, color, national origin, age, disability, sex, sexual orientation, or gender identity.            Thank you!     Thank you for choosing [facility]  for your care. Our goal is always to provide you with excellent care. Hearing back from our patients is one way we can continue to improve our services. Please take a few minutes to complete the written survey that you may receive in the mail after your visit with us. Thank you!             Your Updated Medication List - Protect others around you: Learn how to safely use, store and throw away your medicines at www.disposemymeds.org.          This list is accurate as of: 11/17/17  4:31 PM.  Always use your most recent med list.                   Brand Name Dispense Instructions for use Diagnosis    IRON SUPPLEMENT PO      Take 325 mg by mouth Reported on 4/29/2017        metroNIDAZOLE 0.75 % vaginal gel    METROGEL    70 g    Place 1 applicator (5 g) vaginally At Bedtime    BV (bacterial vaginosis)       PRENATAL VIT W/ FE BISG-FA PO      Reported on 4/29/2017

## 2017-11-17 NOTE — PROGRESS NOTES
Oncology Risk Management Consultation:  Date on this visit: 11/17/2017    Karolina Herrera  returns to the [facility] clinic today for a six month follow up.  She requires heightened screening and surveillance for her higher risk of breast cancer, secondary to a  family history of invasive ductal carcinoma of the breast in her sister at age 39.      By report, her sister had BRCA1 and BRCA2 testing and was found to be negative.       Karolina is considered to at high risk for breast cancer and has a 23.1% lifetime risk for breast cancer by the TIEN model.     Primary Physician: MIKEL Lopez NP    History Of Present Illness:  Ms. Herrera is a very pleasant, healthy  37 year old female who presents with family history of breast cancer.     Genetic testing:  No genetic testing to date. Her sister, who had invasive ductal carcinoma at age 39, by report, was negative for genetic mutations in the BRCA1 and BRCA2 genes and Karolina has not had genetic testing.     Pertinent health history:  Menarche at 12.  First child at age 35, conceived using one cycle of IVF.   Premenopausal.  Ovaries and uterus intact.   OCP use for one year.  Hx of mastitis in 2016 with breastfeeding, resolved with antibiotics.  History of breastfeeding x8 months.  No history of breast screening.  No history of breast disease, atypia, malignancy.  No history of breast biopsy.  Polycystic ovarian syndrome    At this visit, she denies asymmetry, lumps, masses, thickening, nipple discharge and skin changes.  She states her breast seem more sensitive to discomfort than they did before she gave birth; that if she feels pressure or sustains a bump, the pain lasts longer and seems more intense.    Past Medical/Surgical History:  Past Medical History:   Diagnosis Date     Abnormal vaginal bleeding 2008 and 2009     At high risk for breast cancer      Past Surgical History:   Procedure Laterality Date     IVS  2015    invitro fertilization      LAPAROSCOPIC CHOLECYSTECTOMY N/A 2016    Procedure: LAPAROSCOPIC CHOLECYSTECTOMY;  Surgeon: Juan F Sherman MD;  Location: [facility]     NASAL/SINUS POLYPECTOMY       wisdom teeth removed         Allergies:  Allergies as of 2017     (No Known Allergies)       Current Medications:  Current Outpatient Prescriptions   Medication Sig Dispense Refill     metroNIDAZOLE (METROGEL) 0.75 % vaginal gel Place 1 applicator (5 g) vaginally At Bedtime 70 g 0     PRENATAL VIT W/ FE BISG-FA PO Reported on 2017       Ferrous Sulfate (IRON SUPPLEMENT PO) Take 325 mg by mouth Reported on 2017          Family History:  Family History   Problem Relation Age of Onset     Esophageal Cancer Maternal Grandfather       in 60s; hx of smoking     Bladder Cancer Paternal Grandfather       in 80s     Breast Cancer Sister 39     invasive ductal carcinoma, treated with lumpectomy, radiation and tamoxifen     Breast Cancer Paternal Aunt      paternal great aunt in 70s       Social History:  Social History     Social History     Marital status:      Spouse name: Samantha     Number of children: 1     Years of education: N/A     Occupational History           works for [employer]     Social History Main Topics     Smoking status: Never Smoker     Smokeless tobacco: Never Used     Alcohol use Yes      Comment: 1-2 per week.     Drug use: No     Sexual activity: Yes     Partners: Female     Other Topics Concern      Service No     Blood Transfusions No     Caffeine Concern No     Occupational Exposure No     Hobby Hazards No     Sleep Concern No     Stress Concern No     Weight Concern No     Special Diet No     Back Care No     Exercise Yes     walks dog 2-3 times/day; prenatal exercises     Seat Belt Yes     Self-Exams Yes     Social History Narrative       Physical Exam:  /84  Pulse 78  Temp 99  F (37.2  C) (Oral)    GENERAL APPEARANCE: healthy, alert and no distress     NECK: no  adenopathy, no asymmetry or masses     LYMPHATICS: No cervical, supraclavicular or axillary lymphadenopathy     RESP: lungs clear to auscultation - no rales, rhonchi or wheezes     CARDIOVASCULAR: regular rate and rhythm, normal S1 S2, no S3 or S4 and no murmur.   BREASTS: Examined in a sitting and lying position. Symmetrical without visible distortion, swelling or rashes. No nipple inversion, nipple discharge, breast dimpling or puckering. Breast tissue is soft to fibrocystic to palpation.  Axillary area without masses or lymphadenopathy bilaterally.       SKIN: no suspicious lesions or rashes    Laboratory/Imaging Studies  Results for orders placed or performed in visit on 10/28/17   US Pelvic Complete w Transvaginal    Narrative    US Pelvic Complete w Transvaginal    Order #: [id number] Accession #: [id number]         Study Notes        Lotus Lewis on 10/31/2017  7:27 AM     Gynecological Ultrasound Report  Pelvic U/S - Follicle Study - Transvaginal  [facility]  Referring physician: Jadyn Zuleta APRN CNP  Sonographer: Lotus Lewis RDMS  Indication:  Antral follicle count  LMP: 10/25/2017  History: no infertility meds, baseline study, hx PCOS  Follicle Study:   Cycle Day: 7  Uterus: 6.2 x 4.0 x 5.2 cm  Endometrium: Thickness total 3.2mm   Right Ovary: 2.0 x 2.6 x 1.3cm.    Follicles: > 10 follicles 3-5 mm range  Left Ovary: 2.9 x 3.1 x 1.7 cm.    Follicles: > 8 follicles 4-6 mm range  Cul de Sac/Pouch of Ziggy: no free fluid     Impression:   The uterus and ovaries were visualized and no abnormalities were seen.  Both ovaries have mulitple small follicles around the periphery,   consistent with the appearance of polycystic ovaries. At least 18 small   follicle were seen today.  Recommend clinical correlation.     LAURA PHELAN                    Follicle stimulating hormone   Result Value Ref Range    FSH 6.1 IU/L   Estradiol   Result Value Ref Range    Estradiol 16 pg/mL    Anti-Mullerian hormone   Result Value Ref Range    Anti-Mullerian Hormone 3.616 0.176 - 11.705 ng/mL   TSH with free T4 reflex   Result Value Ref Range    TSH 1.87 0.40 - 4.00 mU/L   Rubella antibody IgM   Result Value Ref Range    Rubella Antibody IgM <0.2 0.0 - 0.8 AI   Hepatitis B surface antigen   Result Value Ref Range    Hep B Surface Agn Nonreactive NR^Nonreactive   Hepatitis C antibody   Result Value Ref Range    Hepatitis C Antibody Nonreactive NR^Nonreactive   HIV Antigen Antibody Combo   Result Value Ref Range    HIV Antigen Antibody Combo Nonreactive NR^Nonreactive       ABO and Rh   Result Value Ref Range    ABO O     RH(D) Pos     Specimen Expires 10/31/2017        ASSESSMENT  We discussed her screening plan. She is having her mammogram appointment in 15 minutes.     Karolina is considering another round of IVF and wants to have another baby in a year or two. She may be starting IVF again in the Spring. We discussed that if this were the case, she should defer routine screening during IVF, as her breasts will be undergoing changes due to the treatments.     Of course, during IVF treatment and subsequent pregnancy, any breast concerns such as lumps, masses, changes, nipple discharge etc. Should be reported immediately and I would be happy to ensure that she has appropriate exam and follow up.       At this time, she will proceed with the following plan.    INDIVIDUALIZED CANCER RISK MANAGEMENT PLAN:  Individualized Surveillance Plan for women  With 20% or greater lifetime risk of breast cancer   Per NCCN Breast Cancer Screening and Diagnosis Guidelines Version 1.2017    Recommended screening Test or procedure Last done Next Scheduled    Clinical encounter Ongoing risk assessment, risk reduction counseling and clinical breast exam, every 6-12 months.   11/17/2017 November 2018   However, some family histories with breast cancers at a very young age, may warrant screening starting earlier.    *May  begin at age 40 if breast cancers in the family occur at later ages.    Annual mammogram beginning 10 years younger than the earliest breast cancer in the family but not prior to age 30.    Recommend annual breast MRI to begin 10 years younger than the earliest breast cancer in the family but not prior to age 25.    Breast MRIs are preferably done on day 7-15 of the menstrual cycle in premenopausal women.   5/26/2017 - Breast MRI, BiRads1    11/16/2016 - Screening mammogram, BiRads1   NEXT: 11/17/2017- Tomosynthesis mammogram today    Next Breast MRI: May 2018 (no earlier than 5/27/2018)       Breast screening for patients at high risk due to thoracic radiation between the ages of 10-30     Begin 8-10 years post radiation treatment or age 25.   Annual mammogram   and  Annual breast MRI, preferably on day 7-15 of menstrual cycle for premenopausal women.    Annual clinical breast exams with ongoing risk assessment and risk reduction counseling until age 25, then every 6-12 months.     NA     NA       I will see her in one year.    I spent 15 minutes with the patient with greater that 50% of it in counseling and coordinating care as documented above.    MIKEL Richey-CNS, OCN, ANG-BC  Clinical Nurse Specialist  Cancer Risk Management Program  [address]  [place] [address]    phone:  [phone]  Pager: [phone]  fax: [phone]    Cc: UBALDO LopezNP

## 2017-12-20 ENCOUNTER — ALLIED HEALTH/NURSE VISIT (OUTPATIENT)
Dept: NURSING | Facility: CLINIC | Age: 37
End: 2017-12-20
Payer: COMMERCIAL

## 2017-12-20 DIAGNOSIS — Z23 NEED FOR VACCINATION: Primary | ICD-10-CM

## 2017-12-20 PROCEDURE — 90471 IMMUNIZATION ADMIN: CPT

## 2017-12-20 PROCEDURE — 90707 MMR VACCINE SC: CPT

## 2017-12-20 PROCEDURE — 99207 ZZC NO CHARGE LOS: CPT

## 2018-05-03 ENCOUNTER — OFFICE VISIT (OUTPATIENT)
Dept: FAMILY MEDICINE | Facility: CLINIC | Age: 38
End: 2018-05-03
Payer: COMMERCIAL

## 2018-05-03 VITALS
WEIGHT: 271.2 LBS | DIASTOLIC BLOOD PRESSURE: 72 MMHG | TEMPERATURE: 96.9 F | BODY MASS INDEX: 42.48 KG/M2 | SYSTOLIC BLOOD PRESSURE: 100 MMHG | OXYGEN SATURATION: 96 % | HEART RATE: 88 BPM

## 2018-05-03 DIAGNOSIS — J06.9 VIRAL UPPER RESPIRATORY TRACT INFECTION: Primary | ICD-10-CM

## 2018-05-03 PROCEDURE — 99213 OFFICE O/P EST LOW 20 MIN: CPT | Performed by: NURSE PRACTITIONER

## 2018-05-03 RX ORDER — FLUTICASONE PROPIONATE 50 MCG
1-2 SPRAY, SUSPENSION (ML) NASAL DAILY
Qty: 1 BOTTLE | Refills: 11 | Status: SHIPPED | OUTPATIENT
Start: 2018-05-03 | End: 2018-05-25

## 2018-05-03 NOTE — PROGRESS NOTES
SUBJECTIVE:   Karolina Herrera is a 38 year old female who presents to clinic today for the following health issues:    Acute Illness   Acute illness concerns: sore throat, painful chest cough, fatigue  Onset: x 4 days    Fever: no    Chills/Sweats: YES    Headache (location?): no    Sinus Pressure:no    Conjunctivitis:  no    Ear Pain: YES    Rhinorrhea: YES- a little bit    Congestion: YES- a little bit    Sore Throat: YES     Cough: YES    Wheeze: no    Decreased Appetite: YES    Nausea: YES    Vomiting: YES    Diarrhea:  no    Dysuria/Freq.: no    Fatigue/Achiness: YES    Sick/Strep Exposure: son     Therapies Tried and outcome: Tylenole      -------------------------------------    Problem list and histories reviewed & adjusted, as indicated.  Additional history: as documented    Patient Active Problem List   Diagnosis     Morbid obesity due to excess calories (H)     Family history of malignant neoplasm of breast     PCOS (polycystic ovarian syndrome)     Irregular menstrual cycle     Irritable bowel syndrome     Body mass index (BMI) greater than 30 in adult     Low back pain     Obesity with body mass index 30 or greater     Candidiasis of mouth     Encounter for gynecological examination without abnormal finding     ACP (advance care planning)     Past Surgical History:   Procedure Laterality Date     GYN SURGERY  2014    Polypectomy     IVS      invitro fertilization     LAPAROSCOPIC CHOLECYSTECTOMY N/A 2016    Procedure: LAPAROSCOPIC CHOLECYSTECTOMY;  Surgeon: Juan F Sherman MD;  [facility]: [facility]     NASAL/SINUS POLYPECTOMY       wisdom teeth removed         Social History   Substance Use Topics     Smoking status: Never Smoker     Smokeless tobacco: Never Used     Alcohol use Yes      Comment: 1-2 per week.     Family History   Problem Relation Age of Onset     Esophageal Cancer Maternal Grandfather       in 60s; hx of smoking     Bladder Cancer Paternal Grandfather       in  80s     Breast Cancer Sister 39     invasive ductal carcinoma, treated with lumpectomy, radiation and tamoxifen     Breast Cancer Paternal Aunt      paternal great aunt in 70s     Breast Cancer Sister      Age 39 at diagnosis           Reviewed and updated as needed this visit by clinical staff       Reviewed and updated as needed this visit by Provider         ROS:  Constitutional, HEENT, cardiovascular, pulmonary, gi and gu systems are negative, except as otherwise noted.    OBJECTIVE:     /72  Pulse 88  Temp 96.9  F (36.1  C) (Temporal)  Wt 271 lb 3.2 oz (123 kg)  SpO2 96%  BMI 42.48 kg/m2  Body mass index is 42.48 kg/(m^2).   GENERAL: healthy, alert and no distress  HENT: ear canals and TM's normal, nose and mouth without ulcers or lesions  NECK: no adenopathy, no asymmetry, masses, or scars and thyroid normal to palpation  RESP: lungs clear to auscultation - no rales, rhonchi or wheezes  CV: regular rate and rhythm, normal S1 S2, no S3 or S4, no murmur, click or rub, no peripheral edema and peripheral pulses strong  ABDOMEN: soft, nontender, no hepatosplenomegaly, no masses and bowel sounds normal  MS: no gross musculoskeletal defects noted, no edema    Diagnostic Test Results:  none     ASSESSMENT/PLAN:     Problem List Items Addressed This Visit     None      Visit Diagnoses     Viral upper respiratory tract infection    -  Primary    Relevant Medications    fluticasone (FLONASE) 50 MCG/ACT spray           MIKEL Maurer CNP  [facility]

## 2018-05-03 NOTE — MR AVS SNAPSHOT
After Visit Summary   5/3/2018    Karoilna Herrera    MRN: [id number]           Patient Information     Date Of Birth          1980        Visit Information        Provider Department      5/3/2018 11:30 AM Kim Urbina APRN CNP [facility]        Today's Diagnoses     Viral upper respiratory tract infection    -  1       Follow-ups after your visit        Who to contact     If you have questions or need follow up information about today's clinic visit or your schedule please contact [facility] directly at [phone].  Normal or non-critical lab and imaging results will be communicated to you by MusicAllhart, letter or phone within 4 business days after the clinic has received the results. If you do not hear from us within 7 days, please contact the clinic through Brevityt or phone. If you have a critical or abnormal lab result, we will notify you by phone as soon as possible.  Submit refill requests through WealthyLife or call your pharmacy and they will forward the refill request to us. Please allow 3 business days for your refill to be completed.          Additional Information About Your Visit        MyChart Information     [employer] gives you secure access to your electronic health record. If you see a primary care provider, you can also send messages to your care team and make appointments. If you have questions, please call your primary care clinic.  If you do not have a primary care provider, please call [phone] and they will assist you.        Care EveryWhere ID     This is your Care EveryWhere ID. This could be used by other organizations to access your [place] medical records  [id number]        Your Vitals Were     Pulse Temperature Pulse Oximetry BMI (Body Mass Index)          88 96.9  F (36.1  C) (Temporal) 96% 42.48 kg/m2         Blood Pressure from Last 3 Encounters:   05/03/18 100/72   11/17/17 138/84   07/27/17 110/76    Weight from Last 3  Encounters:   05/03/18 271 lb 3.2 oz (123 kg)   07/27/17 266 lb 14.4 oz (121.1 kg)   05/26/17 260 lb (117.9 kg)              Today, you had the following     No orders found for display         Today's Medication Changes          These changes are accurate as of 5/3/18 11:51 AM.  If you have any questions, ask your nurse or doctor.               Start taking these medicines.        Dose/Directions    fluticasone 50 MCG/ACT spray   Commonly known as:  FLONASE   Used for:  Viral upper respiratory tract infection   Started by:  Kim Urbina APRN CNP        Dose:  1-2 spray   Spray 1-2 sprays into both nostrils daily   Quantity:  1 Bottle   Refills:  11            Where to get your medicines      These medications were sent to [place] [address] [place] [facility] [address] [facility]  [address] [facility] [address]     Phone:  [phone]     fluticasone 50 MCG/ACT spray                Primary Care Provider Office Phone # Fax #    MIKEL Lopez [phone]       [address]  [facility] [address]        Equal Access to Services     [facility] AH: Hadii aylin mackenzieo Sowillian, waaxda luqadaha, qaybta kaalmada adeegyada, wilfrido hewitt . So [facility] [phone].    ATENCIÓN: Si habla español, tiene a garcia disposición servicios gratuitos de asistencia lingüística. Llame al [phone].    We comply with applicable federal civil rights laws and [place] laws. We do not discriminate on the basis of race, color, national origin, age, disability, sex, sexual orientation, or gender identity.            Thank you!     Thank you for choosing [facility]  for your care. Our goal is always to provide you with excellent care. Hearing back from our patients is one way we can continue to improve our services. Please take a few minutes to complete the written survey that you may receive in the mail after your visit with us. Thank you!              Your Updated Medication List - Protect others around you: Learn how to safely use, store and throw away your medicines at www.disposemymeds.org.          This list is accurate as of 5/3/18 11:51 AM.  Always use your most recent med list.                   Brand Name Dispense Instructions for use Diagnosis    fluticasone 50 MCG/ACT spray    FLONASE    1 Bottle    Spray 1-2 sprays into both nostrils daily    Viral upper respiratory tract infection       IRON SUPPLEMENT PO      Take 325 mg by mouth Reported on 4/29/2017        PRENATAL VIT W/ FE BISG-FA PO      Reported on 4/29/2017

## 2018-05-25 ENCOUNTER — ONCOLOGY VISIT (OUTPATIENT)
Dept: ONCOLOGY | Facility: CLINIC | Age: 38
End: 2018-05-25
Attending: CLINICAL NURSE SPECIALIST
Payer: COMMERCIAL

## 2018-05-25 VITALS
OXYGEN SATURATION: 95 % | HEIGHT: 67 IN | WEIGHT: 272 LBS | HEART RATE: 85 BPM | RESPIRATION RATE: 16 BRPM | BODY MASS INDEX: 42.69 KG/M2 | TEMPERATURE: 97.6 F

## 2018-05-25 DIAGNOSIS — Z91.89 AT HIGH RISK FOR BREAST CANCER: Primary | ICD-10-CM

## 2018-05-25 PROBLEM — O09.90 HIGH RISK PREGNANCY, ANTEPARTUM: Status: ACTIVE | Noted: 2018-03-30

## 2018-05-25 PROCEDURE — 99213 OFFICE O/P EST LOW 20 MIN: CPT | Mod: ZP | Performed by: CLINICAL NURSE SPECIALIST

## 2018-05-25 PROCEDURE — G0463 HOSPITAL OUTPT CLINIC VISIT: HCPCS | Mod: ZF

## 2018-05-25 ASSESSMENT — PAIN SCALES - GENERAL: PAINLEVEL: NO PAIN (0)

## 2018-05-25 NOTE — LETTER
Cancer Risk Management  Program Locations    [facility]  [facility]  [facility]  [facility]  Mailing Address  Cancer Risk Management Program  [facility]  [address] [facility] [address]  [place] [address]    New patient appointments  [phone]  May 25, 2018    Karolina Herrera  [address]  [facility] [address]      Dear Karolina,    It was a pleasure meeting with you today.  Below is a copy of my note from our visit, outlining your surveillance plan.  I also put in printed information about contrast dye in lactating mothers.    I look forward to seeing you in the future to coordinate your care and reduce your health risks. Please feel free to contact me if you have any questions or concerns and I will see you again next year.      Oncology Risk Management Consultation:  Date on this visit: 5/25/2018    Karolina Herrera  returns to the [facility] today for a six month follow up.  She requires heightened screening and surveillance for her higher risk of breast cancer, secondary to a  family history of invasive ductal carcinoma of the breast in her sister at age 39.       By report, her sister had BRCA1 and BRCA2 testing and was found to be negative.        Karolina is considered to at high risk for breast cancer and has a 23.1% lifetime risk for breast cancer by the TIEN model.      Primary Physician:  MIKEL Lopez, NP    History Of Present Illness:  Ms. Herrera is a very pleasant, healthy  38 year old female who presents with family history of breast cancer.     Genetic testing:  No genetic testing to date. Her sister, who had invasive ductal carcinoma at age 39, by report, was negative for genetic mutations in the BRCA1 and BRCA2 genes and Karolina has not had genetic testing.      Pertinent health history:  Menarche at 12.  First  "child at age 35, conceived using one cycle of IVF.   Premenopausal.  Ovaries and uterus intact.   OCP use for one year.  Hx of mastitis in 2016 with breastfeeding, resolved with antibiotics.  History of breastfeeding x13 months.  No history of breast screening.  No history of breast disease, atypia, malignancy.  No history of breast biopsy.  Polycystic ovarian syndrome  Scattered fibroglandular densities.    Pertinent Screening History:  11/16/2016 - Screening mammogram, BiRads1  5/26/2017 - Breast MRI, BiRads1  11/17/2017 - Screening mammogram, BiRads1    At this visit, she denies asymmetry, lumps, masses, thickening, pain, nipple discharge and skin changes.      Karolina reports that she is about 16 weeks pregnant and acknowledges intermittent, \"minor\" breast pain bilaterally. She denies lumps, masses, unusual nipple discharge, nipple inversion, dimpling, puckering and changes in symmetry.    Past Medical/Surgical History:  Past Medical History:   Diagnosis Date     Abnormal vaginal bleeding 2008 and 2009     At high risk for breast cancer      Family history of malignant neoplasm of breast      Past Surgical History:   Procedure Laterality Date     GYN SURGERY  7/2014    Polypectomy     IVS  2015    invitro fertilization     LAPAROSCOPIC CHOLECYSTECTOMY N/A 11/7/2016    Procedure: LAPAROSCOPIC CHOLECYSTECTOMY;  Surgeon: Juan F Sherman MD;  [facility]: [facility]     NASAL/SINUS POLYPECTOMY       wisdom teeth removed         Allergies:  Allergies as of 05/25/2018     (No Known Allergies)       Current Medications:  Current Outpatient Prescriptions   Medication Sig Dispense Refill     Ferrous Sulfate (IRON SUPPLEMENT PO) Take 325 mg by mouth Reported on 4/29/2017       fluticasone (FLONASE) 50 MCG/ACT spray Spray 1-2 sprays into both nostrils daily 1 Bottle 11     PRENATAL VIT W/ FE BISG-FA PO Reported on 4/29/2017          Family History:  Family History   Problem Relation Age of Onset     Esophageal Cancer " Maternal Grandfather       in 60s; hx of smoking     Bladder Cancer Paternal Grandfather       in 80s     Breast Cancer Sister 39     invasive ductal carcinoma, treated with lumpectomy, radiation and tamoxifen     Breast Cancer Paternal Aunt      paternal great aunt in 70s     Breast Cancer Sister      Age 39 at diagnosis       Social History:  Social History     Social History     Marital status:      Spouse name: Samantha     Number of children: 1     Years of education: N/A     Occupational History           works for [employer]     Social History Main Topics     Smoking status: Never Smoker     Smokeless tobacco: Never Used     Alcohol use Yes      Comment: 1-2 per week.     Drug use: No     Sexual activity: Yes     Partners: Female     Birth control/ protection: None     Other Topics Concern      Service No     Blood Transfusions No     Caffeine Concern No     Occupational Exposure No     Hobby Hazards No     Sleep Concern No     Stress Concern No     Weight Concern No     Special Diet No     Back Care No     Exercise Yes     walks dog 2-3 times/day; prenatal exercises     Seat Belt Yes     Self-Exams Yes     Parent/Sibling W/ Cabg, Mi Or Angioplasty Before 65f 55m? No     Social History Narrative       Physical Exam:  There were no vitals taken for this visit.    GENERAL APPEARANCE: healthy, alert and no distress     NECK: no adenopathy, no asymmetry or masses     LYMPHATICS: No cervical, supraclavicular or axillary lymphadenopathy     RESP: lungs clear to auscultation - no rales, rhonchi or wheezes     CARDIOVASCULAR: regular rate and rhythm, normal S1 S2, no S3 or S4 and no murmur.   BREAST: A multi positional, bilateral breast exam was performed.  Very symmetrical pendulous breasts. Right breast: no palpable dominant masses, no nipple discharge, no skin changes. Dense tissue.  Right axilla: no palpable adenopathy. Left breast: no palpable dominant masses, no nipple discharge,  no skin changes. Left axilla: no palpable adenopathy. Dense tissue.        SKIN: no suspicious lesions or rashes on anterior or posterior torso, upper extremities or face.    Laboratory/Imaging Studies  Results for orders placed or performed in visit on 10/28/17   MA Screen Bilateral w/Jose Guadalupe    Narrative    SCREENING MAMMOGRAM, BILATERAL, DIGITAL, with CAD and TOMOSYNTHESIS  11/17/2017    HISTORY: Asymptomatic. Family history of sister diagnosed with breast  cancer at age 39, and paternal great aunt diagnosed with breast cancer  in her 70s. Elevated lifetime risk for breast cancer. Patient is seen  in the high risk breast cancer clinic.    COMPARISON: Screening mammogram 11/16/2016, breast MRI 5/26/2027    TECHNIQUE: Routine screening mammogram with CAD and tomosynthesis.    BREAST DENSITY: Scattered fibroglandular densities.    FINDINGS: No significant change.      Impression    IMPRESSION: BI-RADS CATEGORY: 1 -  NEGATIVE.    RECOMMENDED FOLLOW-UP: Annual high risk screening including  mammography    Results to be sent to patient.    I have personally reviewed the examination and initial interpretation  and I agree with the findings.    SEEMA STATON MD   US Pelvic Complete w Transvaginal    Narrative    US Pelvic Complete w Transvaginal    Order #: [id number] Accession #: [id number]         Study Notes        Lotus Lewis on 10/31/2017  7:27 AM     Gynecological Ultrasound Report  Pelvic U/S - Follicle Study - Transvaginal  [facility]  Referring physician: Jadyn Zuleta APRN CNP  Sonographer: Lotus Lewis RDMS  Indication:  Antral follicle count  LMP: 10/25/2017  History: no infertility meds, baseline study, hx PCOS  Follicle Study:   Cycle Day: 7  Uterus: 6.2 x 4.0 x 5.2 cm  Endometrium: Thickness total 3.2mm   Right Ovary: 2.0 x 2.6 x 1.3cm.    Follicles: > 10 follicles 3-5 mm range  Left Ovary: 2.9 x 3.1 x 1.7 cm.    Follicles: > 8 follicles 4-6 mm range  Cul de Sac/Pouch of Ziggy: no  free fluid     Impression:   The uterus and ovaries were visualized and no abnormalities were seen.  Both ovaries have mulitple small follicles around the periphery,   consistent with the appearance of polycystic ovaries. At least 18 small   follicle were seen today.  Recommend clinical correlation.     LAURA PHELAN                    Follicle stimulating hormone   Result Value Ref Range    FSH 6.1 IU/L   Estradiol   Result Value Ref Range    Estradiol 16 pg/mL   Anti-Mullerian hormone   Result Value Ref Range    Anti-Mullerian Hormone 3.616 0.176 - 11.705 ng/mL   TSH with free T4 reflex   Result Value Ref Range    TSH 1.87 0.40 - 4.00 mU/L   Rubella antibody IgM   Result Value Ref Range    Rubella Antibody IgM <0.2 0.0 - 0.8 AI   Hepatitis B surface antigen   Result Value Ref Range    Hep B Surface Agn Nonreactive NR^Nonreactive   Hepatitis C antibody   Result Value Ref Range    Hepatitis C Antibody Nonreactive NR^Nonreactive   HIV Antigen Antibody Combo   Result Value Ref Range    HIV Antigen Antibody Combo Nonreactive NR^Nonreactive       ABO and Rh   Result Value Ref Range    ABO O     RH(D) Pos     Specimen Expires 10/31/2017        ASSESSMENT  We discussed that her next screening should be a breast MRI. At this point, she is expecting the baby in November and will breastfeed.  We will revisit sometime in the Spring of 2019 and she may elect to pump milk and dump it for 24 hours. She may consider the following information:    CT and MRI Contrast in Breastfeeding Mothers  What is contrast?  If you need a CT or MRI exam, you may receive contrast fluid. This fluid helps the blood vessels or tissue show up better on the scans.  You will receive the contrast fluid by IV (a thin tube placed in the hand or arm).   Will my milk be safe for my baby after I have contrast?  Yes, it is safe to breastfeed after having contrast. Here are a few facts:    Half of the contrast will leave your body within two  hours.    Nearly all of the contrast leaves your bloodstream within 24 hours.    A very tiny amount reaches the breast milk. (For CT contrast, it is less than 1 percent. For MRI contrast, it is less than .5 percent.)    Of that amount, the baby will absorb less than 1 percent.  This means the amount of contrast that reaches the baby is very, very low.   What if I'm still worried?  You may choose to stop breastfeeding for 24 hours. (If your baby is less than a month old, it may be hard to start breastfeeding again.) If you take a break for 24 hours, you will need to pump your milk often--as often as you baby would be nursing. Throw the milk away.     If your baby is under six week old, plan to pump every two to three hours, even at night. This keeps your breasts from getting overly full. For older babies, you may need to pump less often.    You may rent a double electric pump from most [facility] pharmacies.    Breast milk is still the best food for your baby. Use stored breast milk rather than formula when you can.    For babies less than a month old: Feed stored breast milk with a small cup or spoon. This may make it easier to re-start breastfeeding after 24 hours. If you use a bottle, choose a slow-flow nipple that is round and short. (To further slow the flow of milk, sit your baby up and hold the bottle lower.)  For more about feeding methods, or if you have trouble breastfeeding again, call your [facility] and ask to speak with a lactation consultant. Or contact a La Leche League Leader at [phone].       INDIVIDUALIZED CANCER RISK MANAGEMENT PLAN:  Individualized Surveillance Plan for women  With 20% or greater lifetime risk of breast cancer   Per NCCN Breast Cancer Screening and Diagnosis Guidelines Version 1.2017    Recommended screening Test or procedure Last done Next Scheduled    Clinical encounter Ongoing risk assessment, risk reduction counseling and clinical breast exam, every 6-12 months.    5/25/2018 Nov. 2018 if not pregnant; may follow up with midwife or OB-GYN   However, some family histories with breast cancers at a very young age, may warrant screening starting earlier.    *May begin at age 40 if breast cancers in the family occur at later ages.    Annual mammogram beginning 10 years younger than the earliest breast cancer in the family but not prior to age 30.    Recommend annual breast MRI to begin 10 years younger than the earliest breast cancer in the family but not prior to age 25.    Breast MRIs are preferably done on day 7-15 of the menstrual cycle in premenopausal women.   5/26/2017 - MRI, BiRads1    11/17/2017 - Screening mammogram, BiRads1   NEXT: Breast MRI          Breast screening for patients at high risk due to thoracic radiation between the ages of 10-30     Begin 8-10 years post radiation treatment or age 25.   Annual mammogram   and  Annual breast MRI, preferably on day 7-15 of menstrual cycle for premenopausal women.    Annual clinical breast exams with ongoing risk assessment and risk reduction counseling until age 25, then every 6-12 months.     NA     NA     I will follow up on her screenings and see her in the Cancer Risk Management clinic in the Spring of 2019.    I spent 22  minutes with the patient with greater that 50% of it in counseling and coordinating care as documented above.    MIKEL Richey-CNS, OCN, ANG-BC  Clinical Nurse Specialist  Cancer Risk Management Program  [address]  [place] [address]    phone:  [phone]  Pager: [phone]  fax: [phone]    Cc: MIKEL Lopez, NP

## 2018-05-25 NOTE — MR AVS SNAPSHOT
After Visit Summary   5/25/2018    Karolina Herrera    MRN: [id number]           Patient Information     Date Of Birth          1980        Visit Information        Provider Department      5/25/2018 12:00 PM Portia Alvarez APRN CNS [facility]        Today's Diagnoses     At high risk for breast cancer    -  1      Care Instructions    Individualized Surveillance Plan for women  With 20% or greater lifetime risk of breast cancer   Per NCCN Breast Cancer Screening and Diagnosis Guidelines Version 1.2017    Recommended screening Test or procedure Last done Next Scheduled    Clinical encounter Ongoing risk assessment, risk reduction counseling and clinical breast exam, every 6-12 months.   5/25/2018 Nov. 2018 if not pregnant; may follow up with midwife or OB-GYN   However, some family histories with breast cancers at a very young age, may warrant screening starting earlier.    *May begin at age 40 if breast cancers in the family occur at later ages.    Annual mammogram beginning 10 years younger than the earliest breast cancer in the family but not prior to age 30.    Recommend annual breast MRI to begin 10 years younger than the earliest breast cancer in the family but not prior to age 25.    Breast MRIs are preferably done on day 7-15 of the menstrual cycle in premenopausal women.   5/26/2017 - MRI, BiRads1    11/17/2017 - Screening mammogram, BiRads1   NEXT: Breast MRI          Breast screening for patients at high risk due to thoracic radiation between the ages of 10-30     Begin 8-10 years post radiation treatment or age 25.   Annual mammogram   and  Annual breast MRI, preferably on day 7-15 of menstrual cycle for premenopausal women.    Annual clinical breast exams with ongoing risk assessment and risk reduction counseling until age 25, then every 6-12 months.     NA     NA     CT and MRI Contrast in Breastfeeding Mothers  What is contrast?  If you need a CT or  MRI exam, you may receive contrast fluid. This fluid helps the blood vessels or tissue show up better on the scans.  You will receive the contrast fluid by IV (a thin tube placed in the hand or arm).   Will my milk be safe for my baby after I have contrast?  Yes, it is safe to breastfeed after having contrast. Here are a few facts:    Half of the contrast will leave your body within two hours.    Nearly all of the contrast leaves your bloodstream within 24 hours.    A very tiny amount reaches the breast milk. (For CT contrast, it is less than 1 percent. For MRI contrast, it is less than .5 percent.)    Of that amount, the baby will absorb less than 1 percent.  This means the amount of contrast that reaches the baby is very, very low.   What if I'm still worried?  You may choose to stop breastfeeding for 24 hours. (If your baby is less than a month old, it may be hard to start breastfeeding again.) If you take a break for 24 hours, you will need to pump your milk often--as often as you baby would be nursing. Throw the milk away.     If your baby is under six week old, plan to pump every two to three hours, even at night. This keeps your breasts from getting overly full. For older babies, you may need to pump less often.    You may rent a double electric pump from most [facility] pharmacies.    Breast milk is still the best food for your baby. Use stored breast milk rather than formula when you can.    For babies less than a month old: Feed stored breast milk with a small cup or spoon. This may make it easier to re-start breastfeeding after 24 hours. If you use a bottle, choose a slow-flow nipple that is round and short. (To further slow the flow of milk, sit your baby up and hold the bottle lower.)  For more about feeding methods, or if you have trouble breastfeeding again, call your [facility] and ask to speak with a lactation consultant. Or contact a La Leche League Leader at [phone].   For  "informational purposes only. Not to replace the advice of your health care provider.   Copyright   2007 [facility]. All rights reserved. [employer] 118394 - 06/17.            Follow-ups after your visit        Follow-up notes from your care team     Return in about 10 months (around 3/29/2019) for Physical Exam.      Who to contact     If you have questions or need follow up information about today's clinic visit or your schedule please contact [facility] directly at [phone].  Normal or non-critical lab and imaging results will be communicated to you by [employer], letter or phone within 4 business days after the clinic has received the results. If you do not hear from us within 7 days, please contact the clinic through [employer] or phone. If you have a critical or abnormal lab result, we will notify you by phone as soon as possible.  Submit refill requests through [employer] or call your pharmacy and they will forward the refill request to us. Please allow 3 business days for your refill to be completed.          Additional Information About Your Visit        [employer] Information     [employer] gives you secure access to your electronic health record. If you see a primary care provider, you can also send messages to your care team and make appointments. If you have questions, please call your primary care clinic.  If you do not have a primary care provider, please call [phone] and they will assist you.        Care EveryWhere ID     This is your Care EveryWhere ID. This could be used by other organizations to access your [place] medical records  [id number]        Your Vitals Were     Pulse Temperature Respirations Height Pulse Oximetry BMI (Body Mass Index)    85 97.6  F (36.4  C) (Oral) 16 1.702 m (5' 7\") 95% 42.6 kg/m2       Blood Pressure from Last 3 Encounters:   05/03/18 100/72   11/17/17 138/84   07/27/17 110/76    Weight from Last 3 Encounters:   05/25/18 123.4 kg (272 lb) "   05/03/18 123 kg (271 lb 3.2 oz)   07/27/17 121.1 kg (266 lb 14.4 oz)              Today, you had the following     No orders found for display       Primary Care Provider Office Phone # Fax #    MIKEL Lopez [facility] [phone]       [address]  [facility] [address]        Equal Access to Services     [facility] Center[facility]IM[facility] : Hadii aad ku hadasho Soomaali, waaxda luqadaha, qaybta kaalmada adeegyada, waxay idiin hayaan adeeg kharash la'aan . So [facility] [phone].    ATENCIÓN: Si habla esplaurel, tiene a garcia disposición servicios gratuitos de asistencia lingüística. Llame al [phone].    We comply with applicable federal civil rights laws and [place] laws. We do not discriminate on the basis of race, color, national origin, age, disability, sex, sexual orientation, or gender identity.            Thank you!     Thank you for choosing [facility]  for your care. Our goal is always to provide you with excellent care. Hearing back from our patients is one way we can continue to improve our services. Please take a few minutes to complete the written survey that you may receive in the mail after your visit with us. Thank you!             Your Updated Medication List - Protect others around you: Learn how to safely use, store and throw away your medicines at www.disposemymeds.org.          This list is accurate as of 5/25/18  1:00 PM.  Always use your most recent med list.                   Brand Name Dispense Instructions for use Diagnosis    IRON SUPPLEMENT PO      Take 325 mg by mouth Reported on 4/29/2017        PRENATAL VIT W/ FE BISG-FA PO      Reported on 4/29/2017

## 2018-05-25 NOTE — PROGRESS NOTES
"Oncology Risk Management Consultation:  Date on this visit: 5/25/2018    Karolina Herrera  returns to the [facility] Cancer clinic today for a six month follow up.  She requires heightened screening and surveillance for her higher risk of breast cancer, secondary to a  family history of invasive ductal carcinoma of the breast in her sister at age 39.       By report, her sister had BRCA1 and BRCA2 testing and was found to be negative.        Karolina is considered to at high risk for breast cancer and has a 23.1% lifetime risk for breast cancer by the TIEN model.     Primary Physician:  MIKEL Lopez, NP    History Of Present Illness:  Ms. Herrera is a very pleasant, healthy  38 year old female who presents with family history of breast cancer.     Genetic testing:  No genetic testing to date. Her sister, who had invasive ductal carcinoma at age 39, by report, was negative for genetic mutations in the BRCA1 and BRCA2 genes and Karolina has not had genetic testing.      Pertinent health history:  Menarche at 12.  First child at age 35, conceived using one cycle of IVF.   Premenopausal.  Ovaries and uterus intact.   OCP use for one year.  Hx of mastitis in 2016 with breastfeeding, resolved with antibiotics.  History of breastfeeding x13 months.  No history of breast screening.  No history of breast disease, atypia, malignancy.  No history of breast biopsy.  Polycystic ovarian syndrome  Scattered fibroglandular densities.    Pertinent Screening History:  11/16/2016 - Screening mammogram, BiRads1  5/26/2017 - Breast MRI, BiRads1  11/17/2017 - Screening mammogram, BiRads1    At this visit, she denies asymmetry, lumps, masses, thickening, pain, nipple discharge and skin changes.      Karolina reports that she is about 16 weeks pregnant and acknowledges intermittent, \"minor\" breast pain bilaterally. She denies lumps, masses, unusual nipple discharge, nipple inversion, dimpling, puckering and changes in symmetry.    Past " Medical/Surgical History:  Past Medical History:   Diagnosis Date     Abnormal vaginal bleeding  and      At high risk for breast cancer      Family history of malignant neoplasm of breast      Past Surgical History:   Procedure Laterality Date     GYN SURGERY  2014    Polypectomy     IVS      invitro fertilization     LAPAROSCOPIC CHOLECYSTECTOMY N/A 2016    Procedure: LAPAROSCOPIC CHOLECYSTECTOMY;  Surgeon: Juan F Sherman MD;  Location: [facility]     NASAL/SINUS POLYPECTOMY       wisdom teeth removed         Allergies:  Allergies as of 2018     (No Known Allergies)       Current Medications:  Current Outpatient Prescriptions   Medication Sig Dispense Refill     Ferrous Sulfate (IRON SUPPLEMENT PO) Take 325 mg by mouth Reported on 2017       fluticasone (FLONASE) 50 MCG/ACT spray Spray 1-2 sprays into both nostrils daily 1 Bottle 11     PRENATAL VIT W/ FE BISG-FA PO Reported on 2017          Family History:  Family History   Problem Relation Age of Onset     Esophageal Cancer Maternal Grandfather       in 60s; hx of smoking     Bladder Cancer Paternal Grandfather       in 80s     Breast Cancer Sister 39     invasive ductal carcinoma, treated with lumpectomy, radiation and tamoxifen     Breast Cancer Paternal Aunt      paternal great aunt in 70s     Breast Cancer Sister      Age 39 at diagnosis       Social History:  Social History     Social History     Marital status:      Spouse name: Samantha     Number of children: 1     Years of education: N/A     Occupational History           works for [employer]     Social History Main Topics     Smoking status: Never Smoker     Smokeless tobacco: Never Used     Alcohol use Yes      Comment: 1-2 per week.     Drug use: No     Sexual activity: Yes     Partners: Female     Birth control/ protection: None     Other Topics Concern      Service No     Blood Transfusions No     Caffeine Concern No      Occupational Exposure No     Hobby Hazards No     Sleep Concern No     Stress Concern No     Weight Concern No     Special Diet No     Back Care No     Exercise Yes     walks dog 2-3 times/day; prenatal exercises     Seat Belt Yes     Self-Exams Yes     Parent/Sibling W/ Cabg, Mi Or Angioplasty Before 65f 55m? No     Social History Narrative       Physical Exam:  There were no vitals taken for this visit.    GENERAL APPEARANCE: healthy, alert and no distress     NECK: no adenopathy, no asymmetry or masses     LYMPHATICS: No cervical, supraclavicular or axillary lymphadenopathy     RESP: lungs clear to auscultation - no rales, rhonchi or wheezes     CARDIOVASCULAR: regular rate and rhythm, normal S1 S2, no S3 or S4 and no murmur.   BREAST: A multi positional, bilateral breast exam was performed.  Very symmetrical pendulous breasts. Right breast: no palpable dominant masses, no nipple discharge, no skin changes. Dense tissue.  Right axilla: no palpable adenopathy. Left breast: no palpable dominant masses, no nipple discharge, no skin changes. Left axilla: no palpable adenopathy. Dense tissue.        SKIN: no suspicious lesions or rashes on anterior or posterior torso, upper extremities or face.    Laboratory/Imaging Studies  Results for orders placed or performed in visit on 10/28/17   MA Screen Bilateral w/Jose Guadalupe    Narrative    SCREENING MAMMOGRAM, BILATERAL, DIGITAL, with CAD and TOMOSYNTHESIS  11/17/2017    HISTORY: Asymptomatic. Family history of sister diagnosed with breast  cancer at age 39, and paternal great aunt diagnosed with breast cancer  in her 70s. Elevated lifetime risk for breast cancer. Patient is seen  in the high risk breast cancer clinic.    COMPARISON: Screening mammogram 11/16/2016, breast MRI 5/26/2027    TECHNIQUE: Routine screening mammogram with CAD and tomosynthesis.    BREAST DENSITY: Scattered fibroglandular densities.    FINDINGS: No significant change.      Impression    IMPRESSION:  BI-RADS CATEGORY: 1 -  NEGATIVE.    RECOMMENDED FOLLOW-UP: Annual high risk screening including  mammography    Results to be sent to patient.    I have personally reviewed the examination and initial interpretation  and I agree with the findings.    SEEMA STATON MD   US Pelvic Complete w Transvaginal    Narrative    US Pelvic Complete w Transvaginal    Order #: [id number] Accession #: [id number]         Study Notes        Lotus Perry on 10/31/2017  7:27 AM     Gynecological Ultrasound Report  Pelvic U/S - Follicle Study - Transvaginal  [facility]  Referring physician: Jadyn Zuleta APRN CNP  Sonographer: Lotus Perry RDMS  Indication:  Antral follicle count  LMP: 10/25/2017  History: no infertility meds, baseline study, hx PCOS  Follicle Study:   Cycle Day: 7  Uterus: 6.2 x 4.0 x 5.2 cm  Endometrium: Thickness total 3.2mm   Right Ovary: 2.0 x 2.6 x 1.3cm.    Follicles: > 10 follicles 3-5 mm range  Left Ovary: 2.9 x 3.1 x 1.7 cm.    Follicles: > 8 follicles 4-6 mm range  Cul de Sac/Pouch of Ziggy: no free fluid     Impression:   The uterus and ovaries were visualized and no abnormalities were seen.  Both ovaries have mulitple small follicles around the periphery,   consistent with the appearance of polycystic ovaries. At least 18 small   follicle were seen today.  Recommend clinical correlation.     LAURA PHELAN                    Follicle stimulating hormone   Result Value Ref Range    FSH 6.1 IU/L   Estradiol   Result Value Ref Range    Estradiol 16 pg/mL   Anti-Mullerian hormone   Result Value Ref Range    Anti-Mullerian Hormone 3.616 0.176 - 11.705 ng/mL   TSH with free T4 reflex   Result Value Ref Range    TSH 1.87 0.40 - 4.00 mU/L   Rubella antibody IgM   Result Value Ref Range    Rubella Antibody IgM <0.2 0.0 - 0.8 AI   Hepatitis B surface antigen   Result Value Ref Range    Hep B Surface Agn Nonreactive NR^Nonreactive   Hepatitis C antibody   Result Value Ref Range     Hepatitis C Antibody Nonreactive NR^Nonreactive   HIV Antigen Antibody Combo   Result Value Ref Range    HIV Antigen Antibody Combo Nonreactive NR^Nonreactive       ABO and Rh   Result Value Ref Range    ABO O     RH(D) Pos     Specimen Expires 10/31/2017        ASSESSMENT  We discussed that her next screening should be a breast MRI. At this point, she is expecting the baby in November and will breastfeed.  We will revisit sometime in the Spring of 2019 and she may elect to pump milk and dump it for 24 hours. She may consider the following information:    CT and MRI Contrast in Breastfeeding Mothers  What is contrast?  If you need a CT or MRI exam, you may receive contrast fluid. This fluid helps the blood vessels or tissue show up better on the scans.  You will receive the contrast fluid by IV (a thin tube placed in the hand or arm).   Will my milk be safe for my baby after I have contrast?  Yes, it is safe to breastfeed after having contrast. Here are a few facts:    Half of the contrast will leave your body within two hours.    Nearly all of the contrast leaves your bloodstream within 24 hours.    A very tiny amount reaches the breast milk. (For CT contrast, it is less than 1 percent. For MRI contrast, it is less than .5 percent.)    Of that amount, the baby will absorb less than 1 percent.  This means the amount of contrast that reaches the baby is very, very low.   What if I'm still worried?  You may choose to stop breastfeeding for 24 hours. (If your baby is less than a month old, it may be hard to start breastfeeding again.) If you take a break for 24 hours, you will need to pump your milk often--as often as you baby would be nursing. Throw the milk away.     If your baby is under six week old, plan to pump every two to three hours, even at night. This keeps your breasts from getting overly full. For older babies, you may need to pump less often.    You may rent a double electric pump from [facility] [place] [place]  pharmacies.    Breast milk is still the best food for your baby. Use stored breast milk rather than formula when you can.    For babies less than a month old: Feed stored breast milk with a small cup or spoon. This may make it easier to re-start breastfeeding after 24 hours. If you use a bottle, choose a slow-flow nipple that is round and short. (To further slow the flow of milk, sit your baby up and hold the bottle lower.)  For more about feeding methods, or if you have trouble breastfeeding again, call your [facility] and ask to speak with a lactation consultant. Or contact a [employer] Leader at [phone].       INDIVIDUALIZED CANCER RISK MANAGEMENT PLAN:  Individualized Surveillance Plan for women  With 20% or greater lifetime risk of breast cancer   Per NCCN Breast Cancer Screening and Diagnosis Guidelines Version 1.2017    Recommended screening Test or procedure Last done Next Scheduled    Clinical encounter Ongoing risk assessment, risk reduction counseling and clinical breast exam, every 6-12 months.   5/25/2018 Nov. 2018 if not pregnant; may follow up with midwife or OB-GYN   However, some family histories with breast cancers at a very young age, may warrant screening starting earlier.    *May begin at age 40 if breast cancers in the family occur at later ages.    Annual mammogram beginning 10 years younger than the earliest breast cancer in the family but not prior to age 30.    Recommend annual breast MRI to begin 10 years younger than the earliest breast cancer in the family but not prior to age 25.    Breast MRIs are preferably done on day 7-15 of the menstrual cycle in premenopausal women.   5/26/2017 - MRI, BiRads1    11/17/2017 - Screening mammogram, BiRads1   NEXT: Breast MRI          Breast screening for patients at high risk due to thoracic radiation between the ages of 10-30     Begin 8-10 years post radiation treatment or age 25.   Annual mammogram   and  Annual breast MRI,  preferably on day 7-15 of menstrual cycle for premenopausal women.    Annual clinical breast exams with ongoing risk assessment and risk reduction counseling until age 25, then every 6-12 months.     NA     NA     I will follow up on her screenings and see her in the Cancer Risk Management clinic in the Spring of 2019.    I spent 22  minutes with the patient with greater that 50% of it in counseling and coordinating care as documented above.    MIKEL Richey-CNS, OCN, ANG-BC  Clinical Nurse Specialist  Cancer Risk Management Program  [address]  [place] [address]    phone:  [phone]  Pager: [phone]  fax: [phone]    Cc: MIKEL Lopez, NP

## 2018-05-25 NOTE — PATIENT INSTRUCTIONS
Individualized Surveillance Plan for women  With 20% or greater lifetime risk of breast cancer   Per NCCN Breast Cancer Screening and Diagnosis Guidelines Version 1.2017    Recommended screening Test or procedure Last done Next Scheduled    Clinical encounter Ongoing risk assessment, risk reduction counseling and clinical breast exam, every 6-12 months.   5/25/2018 Nov. 2018 if not pregnant; may follow up with midwife or OB-GYN   However, some family histories with breast cancers at a very young age, may warrant screening starting earlier.    *May begin at age 40 if breast cancers in the family occur at later ages.    Annual mammogram beginning 10 years younger than the earliest breast cancer in the family but not prior to age 30.    Recommend annual breast MRI to begin 10 years younger than the earliest breast cancer in the family but not prior to age 25.    Breast MRIs are preferably done on day 7-15 of the menstrual cycle in premenopausal women.   5/26/2017 - MRI, BiRads1    11/17/2017 - Screening mammogram, BiRads1   NEXT: Breast MRI          Breast screening for patients at high risk due to thoracic radiation between the ages of 10-30     Begin 8-10 years post radiation treatment or age 25.   Annual mammogram   and  Annual breast MRI, preferably on day 7-15 of menstrual cycle for premenopausal women.    Annual clinical breast exams with ongoing risk assessment and risk reduction counseling until age 25, then every 6-12 months.     NA     NA     CT and MRI Contrast in Breastfeeding Mothers  What is contrast?  If you need a CT or MRI exam, you may receive contrast fluid. This fluid helps the blood vessels or tissue show up better on the scans.  You will receive the contrast fluid by IV (a thin tube placed in the hand or arm).   Will my milk be safe for my baby after I have contrast?  Yes, it is safe to breastfeed after having contrast. Here are a few facts:    Half of the contrast will leave your body within  two hours.    Nearly all of the contrast leaves your bloodstream within 24 hours.    A very tiny amount reaches the breast milk. (For CT contrast, it is less than 1 percent. For MRI contrast, it is less than .5 percent.)    Of that amount, the baby will absorb less than 1 percent.  This means the amount of contrast that reaches the baby is very, very low.   What if I'm still worried?  You may choose to stop breastfeeding for 24 hours. (If your baby is less than a month old, it may be hard to start breastfeeding again.) If you take a break for 24 hours, you will need to pump your milk often--as often as you baby would be nursing. Throw the milk away.     If your baby is under six week old, plan to pump every two to three hours, even at night. This keeps your breasts from getting overly full. For older babies, you may need to pump less often.    You may rent a double electric pump from most [facility] pharmacies.    Breast milk is still the best food for your baby. Use stored breast milk rather than formula when you can.    For babies less than a month old: Feed stored breast milk with a small cup or spoon. This may make it easier to re-start breastfeeding after 24 hours. If you use a bottle, choose a slow-flow nipple that is round and short. (To further slow the flow of milk, sit your baby up and hold the bottle lower.)  For more about feeding methods, or if you have trouble breastfeeding again, call your [facility] and ask to speak with a lactation consultant. Or contact a [employer] Leader at [phone].   For informational purposes only. Not to replace the advice of your health care provider.   Copyright   2007 [facility]. All rights reserved. [employer] [id number] - 06/17.

## 2018-05-25 NOTE — NURSING NOTE
"Oncology Rooming Note    May 25, 2018 12:09 PM   Karolina Herrera is a 38 year old female who presents for:    Chief Complaint   Patient presents with     Oncology Clinic Visit     6 mo f/u     Initial Vitals: Pulse 85  Temp 97.6  F (36.4  C) (Oral)  Resp 16  Ht 1.702 m (5' 7\")  Wt 123.4 kg (272 lb)  SpO2 95%  BMI 42.6 kg/m2 Estimated body mass index is 42.6 kg/(m^2) as calculated from the following:    Height as of this encounter: 1.702 m (5' 7\").    Weight as of this encounter: 123.4 kg (272 lb). Body surface area is 2.42 meters squared.  No Pain (0) Comment: Data Unavailable   No LMP recorded. Patient is pregnant.  Allergies reviewed: Yes  Medications reviewed: Yes    Medications: Medication refills not needed today.  Pharmacy name entered into [employer]:    [facility] [id number] [place] [address] [facility] [address] [place] [address] AVE AT [address] [address] [place], [place] - [address] [place] [address] [address]    Clinical concerns: No New Concerns    5 minutes for nursing intake (face to face time)     JAYESH Herndon      "

## 2018-12-04 ENCOUNTER — TELEPHONE (OUTPATIENT)
Dept: ONCOLOGY | Facility: CLINIC | Age: 38
End: 2018-12-04

## 2018-12-28 ENCOUNTER — TELEPHONE (OUTPATIENT)
Dept: FAMILY MEDICINE | Facility: CLINIC | Age: 38
End: 2018-12-28

## 2018-12-28 DIAGNOSIS — Z91.89 AT RISK FOR INEFFECTIVE BREASTFEEDING: Primary | ICD-10-CM

## 2018-12-28 NOTE — TELEPHONE ENCOUNTER
Georgie WONG, referral specialist    See pt request    Referral cued    Estela Rdz RN   [facility]

## 2018-12-28 NOTE — TELEPHONE ENCOUNTER
Reason for call:  Order   Order or referral being requested: Referral for lactation services at [facility] [place] [place]  Reason for request: NA  Date needed: as soon as possible  Has the patient been seen by the PCP for this problem? NO    Additional comments: Keara from [facility] [place] [place] called to get a referral from Georgie Alvarez for lactation services at [facility] [place] [place] for the patient.    Phone number to reach patient:  Other phone number: [phone]    Best Time: Any    Can we leave a detailed message on this number?  YES

## 2019-01-02 NOTE — TELEPHONE ENCOUNTER
Returned call to Keara with [facility]. Keara states they need a referral backdated. Writer informed Keara that we are unable to backdate referrals and that the referral order was placed 01/01/2019. Keara verbalized understanding. Closing encounter, no further action required at this time    Pallavi Conner, [employer]  Triage Nurse

## 2019-01-02 NOTE — TELEPHONE ENCOUNTER
Keara from [facility] [place] call and would like a call back at [phone] whenever someone is available

## 2019-05-14 ENCOUNTER — TELEPHONE (OUTPATIENT)
Dept: ONCOLOGY | Facility: CLINIC | Age: 39
End: 2019-05-14

## 2019-08-08 ENCOUNTER — ANCILLARY PROCEDURE (OUTPATIENT)
Dept: MRI IMAGING | Facility: CLINIC | Age: 39
End: 2019-08-08
Attending: CLINICAL NURSE SPECIALIST
Payer: COMMERCIAL

## 2019-08-08 RX ORDER — GADOBUTROL 604.72 MG/ML
15 INJECTION INTRAVENOUS ONCE
Status: COMPLETED | OUTPATIENT
Start: 2019-08-08 | End: 2019-08-08

## 2019-08-08 RX ADMIN — GADOBUTROL 12 ML: 604.72 INJECTION INTRAVENOUS at 14:37

## 2019-08-08 NOTE — DISCHARGE INSTRUCTIONS
MRI Contrast Discharge Instructions    The IV contrast you received today will pass out of your body in your  urine. This will happen in the next 24 hours. You will not feel this process.  Your urine will not change color.    Drink at least 4 extra glasses of water or juice today (unless your doctor  has restricted your fluids). This reduces the stress on your kidneys.  You may take your regular medicines.    If you are on dialysis: It is best to have dialysis today.    If you have a reaction: Most reactions happen right away. If you have  any new symptoms after leaving the hospital (such as hives or swelling),  call your hospital at the correct number below. Or call your family doctor.  If you have breathing distress or wheezing, call 911.    Special instructions: ***    I have read and understand the above information.    Signature:______________________________________ Date:___________    Staff:__________________________________________ Date:___________     Time:__________    [facility] Departments:    ___[place]: [phone]  ___[facility]: [phone]  ___[place]: [phone] ___[facility]: [phone]  ___[facility]: [phone]  ___[facility]: [phone]  ___[phone]  ___[facility]: [phone]  ___[phone]

## 2019-08-22 DIAGNOSIS — Z12.39 BREAST CANCER SCREENING, HIGH RISK PATIENT: Primary | ICD-10-CM

## 2019-08-22 DIAGNOSIS — Z91.89 AT HIGH RISK FOR BREAST CANCER: ICD-10-CM

## 2019-09-13 ENCOUNTER — OFFICE VISIT (OUTPATIENT)
Dept: FAMILY MEDICINE | Facility: CLINIC | Age: 39
End: 2019-09-13
Payer: COMMERCIAL

## 2019-09-13 VITALS
HEIGHT: 67 IN | RESPIRATION RATE: 18 BRPM | OXYGEN SATURATION: 96 % | HEART RATE: 94 BPM | BODY MASS INDEX: 44.1 KG/M2 | SYSTOLIC BLOOD PRESSURE: 114 MMHG | WEIGHT: 281 LBS | TEMPERATURE: 97.6 F | DIASTOLIC BLOOD PRESSURE: 80 MMHG

## 2019-09-13 DIAGNOSIS — J20.8 ACUTE BRONCHITIS DUE TO OTHER SPECIFIED ORGANISMS: Primary | ICD-10-CM

## 2019-09-13 DIAGNOSIS — N64.4 BREAST PAIN: ICD-10-CM

## 2019-09-13 PROCEDURE — 99214 OFFICE O/P EST MOD 30 MIN: CPT | Performed by: PHYSICIAN ASSISTANT

## 2019-09-13 RX ORDER — ALBUTEROL SULFATE 90 UG/1
2 AEROSOL, METERED RESPIRATORY (INHALATION) EVERY 4 HOURS PRN
Qty: 1 INHALER | Refills: 0 | Status: SHIPPED | OUTPATIENT
Start: 2019-09-13 | End: 2023-10-17

## 2019-09-13 ASSESSMENT — MIFFLIN-ST. JEOR: SCORE: 1987.24

## 2019-09-13 NOTE — PROGRESS NOTES
Subjective     Karolina Herrera is a 39 year old female who presents to clinic today for the following health issues:    HPI   RESPIRATORY SYMPTOMS      Duration: a little over a week    Description  Cough.  Pt mentioned that she is breast feeding and has had some breast pain but she said that is improving    Severity: moderate    Accompanying signs and symptoms: None    History (predisposing factors):  none    Precipitating or alleviating factors: None    Therapies tried and outcome:  none    - Low-grade fever two days ago, now resolved. Dry cough, aggravated by talking and deep breathing. Denies rhinorrhea, ear pain, ST, productive cough, CP.  - Patient is breast-feeding. Developed RUQ Rt breast pain and swelling. No redness, no warmth. Not getting worse, but not getting better.  - Requests referral to psychiatry for post-partum depression issues.    Patient Active Problem List   Diagnosis     Morbid obesity due to excess calories (H)     Family history of malignant neoplasm of breast     PCOS (polycystic ovarian syndrome)     Irregular menstrual cycle     Irritable bowel syndrome     Body mass index (BMI) greater than 30 in adult     Low back pain     Obesity with body mass index 30 or greater     Encounter for gynecological examination without abnormal finding     ACP (advance care planning)     High risk pregnancy, antepartum     Class 3 obesity with serious comorbidity in adult     Past Surgical History:   Procedure Laterality Date     GYN SURGERY  7/2014    Polypectomy     IVS  2015    invitro fertilization     LAPAROSCOPIC CHOLECYSTECTOMY N/A 11/7/2016    Procedure: LAPAROSCOPIC CHOLECYSTECTOMY;  Surgeon: Juan F Sherman MD;  Location: [facility] OR     NASAL/SINUS POLYPECTOMY       wisdom teeth removed         Social History     Tobacco Use     Smoking status: Never Smoker     Smokeless tobacco: Never Used   Substance Use Topics     Alcohol use: Yes     Comment: 1-2 per week.     Family History   Problem  "Relation Age of Onset     Esophageal Cancer Maternal Grandfather          in 60s; hx of smoking     Bladder Cancer Paternal Grandfather          in 80s     Breast Cancer Sister 39        invasive ductal carcinoma, treated with lumpectomy, radiation and tamoxifen     Breast Cancer Paternal Aunt         paternal great aunt in 70s     Breast Cancer Sister         Age 39 at diagnosis         Current Outpatient Medications   Medication Sig Dispense Refill     albuterol (PROAIR HFA/PROVENTIL HFA/VENTOLIN HFA) 108 (90 Base) MCG/ACT inhaler Inhale 2 puffs into the lungs every 4 hours as needed for shortness of breath / dyspnea, wheezing or other (cough) 1 Inhaler 0     PRENATAL VIT W/ FE BISG-FA PO Reported on 2017       Ferrous Sulfate (IRON SUPPLEMENT PO) Take 325 mg by mouth Reported on 2017         Reviewed and updated as needed this visit by Provider  Tobacco  Allergies  Meds  Problems  Med Hx  Surg Hx  Fam Hx         Review of Systems   ROS COMP: Constitutional, HEENT, cardiovascular, pulmonary, GI, , musculoskeletal, neuro, skin, endocrine and psych systems are negative, except as otherwise noted.      Objective    /80   Pulse 94   Temp 97.6  F (36.4  C)   Resp 18   Ht 1.702 m (5' 7\")   Wt 127.5 kg (281 lb)   SpO2 96%   BMI 44.01 kg/m    Body mass index is 44.01 kg/m .  Physical Exam   GENERAL:  WDWN, no acute distress  PSYCH: pleasant, cooperative  EYES: no discharge, no injection  HENT:  Normocephalic. Moist mucus membranes. Ear canals clear, TMs pearly gray w/o effusion. Oropharynx pink, uvula midline.  NECK:  Supple, symmetric, no CHAZ  LUNGS: Faint expiratory wheeze throughout; no rales or rhonchi. Good respiratory effort. Frequent dry cough.  HEART:  Regular rate & rhythm. No murmur, gallop, or rub.  BREAST: Rt breasts non-tender, axillae non-tender and no nipple discharge; Lymphatic: axillary non-tender, not enlarged.  EXTREMITIES:  No gross deformities, moves all 4 limbs " spontaneously  SKIN:  Warm and dry, no rash or suspicious lesions    NEUROLOGIC: alert, sensation grossly intact.    Diagnostic Test Results:  none         Assessment & Plan       ICD-10-CM    1. Acute bronchitis due to other specified organisms J20.8 albuterol (PROAIR HFA/PROVENTIL HFA/VENTOLIN HFA) 108 (90 Base) MCG/ACT inhaler   2. Breast pain N64.4    3. Post partum depression O99.345 MENTAL HEALTH REFERRAL  - Adult; Psychiatry and Medication Management; Psychiatry; Other: [facility] [phone]; We will contact you to schedule the appointment or please call with any questions    F53.0      - Viral bronchitis, discuss pathophys. Use albuterol prn cough, wheeze, SOB. Ok to use OTC Robitussin prn as well. Get plenty of fluids and rests.  - Breast pain likely clogged duct, discussed sx that warrant recheck. Continue to breast feed; hot compress prn.  - Referred to psych as requested.    Return in about 1 week (around 9/20/2019), or if symptoms worsen or fail to improve.    Nancy Anne PA-C  [facility]

## 2019-09-16 ENCOUNTER — NURSE TRIAGE (OUTPATIENT)
Dept: NURSING | Facility: CLINIC | Age: 39
End: 2019-09-16

## 2019-09-16 ENCOUNTER — TELEPHONE (OUTPATIENT)
Dept: FAMILY MEDICINE | Facility: CLINIC | Age: 39
End: 2019-09-16

## 2019-09-16 ENCOUNTER — MYC MEDICAL ADVICE (OUTPATIENT)
Dept: FAMILY MEDICINE | Facility: CLINIC | Age: 39
End: 2019-09-16

## 2019-09-16 PROBLEM — O09.90 HIGH RISK PREGNANCY, ANTEPARTUM: Status: RESOLVED | Noted: 2018-03-30 | Resolved: 2019-09-16

## 2019-09-16 PROBLEM — Z86.32 HISTORY OF GESTATIONAL DIABETES: Status: ACTIVE | Noted: 2018-08-24

## 2019-09-16 NOTE — TELEPHONE ENCOUNTER
Reason for call:  Other   Patient called regarding (reason for call): call back  Additional comments: wheezing and tightness in chest     Phone number to reach patient:  Cell number on file:    Telephone Information:   Mobile [phone]       Best Time:  anytime    Can we leave a detailed message on this number?  YES

## 2019-09-16 NOTE — TELEPHONE ENCOUNTER
Diagnosed with Bronchitis three days ago and has been using her Albuterol inhaler, but still having wheezing and chest tightness.  Denies shortness of breath and pain.  Recommended she see provider within four hours per protocol but she said she doesn't feel that is necessary and wants to make appointment for tomorrow.  Connected to schedulers.    Celia Landaverde RN  [facility]      Reason for Disposition    Wheezing is present    Additional Information    Negative: Severe difficulty breathing (e.g., struggling for each breath, speaks in single words)    Negative: Bluish (or gray) lips or face now    Negative: [1] Rapid onset of cough AND [2] has hives    Negative: Coughing started suddenly after medicine, an allergic food or bee sting    Negative: [1] Difficulty breathing AND [2] exposure to flames, smoke, or fumes    Negative: [1] Stridor AND [2] difficulty breathing    Negative: Sounds like a life-threatening emergency to the triager    Negative: Chest pain  (Exception: MILD central chest pain, present only when coughing)    Negative: Difficulty breathing    Negative: Patient sounds very sick or weak to the triager    Negative: [1] Coughed up blood AND [2] > 1 tablespoon (15 ml) (Exception: blood-tinged sputum)    Negative: Fever > 103 F (39.4 C)    Negative: [1] Fever > 101 F (38.3 C) AND [2] age > 60    Negative: [1] Fever > 100.0 F (37.8 C) AND [2] bedridden (e.g., nursing home patient, CVA, chronic illness, recovering from surgery)    Negative: [1] Fever > 100.0 F (37.8 C) AND [2] diabetes mellitus or weak immune system (e.g., HIV positive, cancer chemo, splenectomy, chronic steroids)    Protocols used: COUGH - ACUTE NON-PRODUCTIVE-A-AH

## 2019-09-16 NOTE — TELEPHONE ENCOUNTER
Patient Contact    Attempt # 1    Was call answered?  No.  Left message on voicemail with information to call triage back.    Upon callback, triage symptoms.

## 2019-09-17 ENCOUNTER — OFFICE VISIT (OUTPATIENT)
Dept: FAMILY MEDICINE | Facility: CLINIC | Age: 39
End: 2019-09-17
Payer: COMMERCIAL

## 2019-09-17 ENCOUNTER — ANCILLARY PROCEDURE (OUTPATIENT)
Dept: GENERAL RADIOLOGY | Facility: CLINIC | Age: 39
End: 2019-09-17
Attending: FAMILY MEDICINE
Payer: COMMERCIAL

## 2019-09-17 VITALS
DIASTOLIC BLOOD PRESSURE: 82 MMHG | OXYGEN SATURATION: 96 % | RESPIRATION RATE: 16 BRPM | HEART RATE: 84 BPM | SYSTOLIC BLOOD PRESSURE: 116 MMHG | WEIGHT: 274.5 LBS | BODY MASS INDEX: 43.08 KG/M2 | HEIGHT: 67 IN | TEMPERATURE: 97 F

## 2019-09-17 DIAGNOSIS — J20.9 ACUTE BRONCHITIS WITH SYMPTOMS > 10 DAYS: Primary | ICD-10-CM

## 2019-09-17 DIAGNOSIS — R07.89 CHEST TIGHTNESS: ICD-10-CM

## 2019-09-17 DIAGNOSIS — R06.2 WHEEZING: ICD-10-CM

## 2019-09-17 DIAGNOSIS — Z23 NEED FOR PROPHYLACTIC VACCINATION AND INOCULATION AGAINST INFLUENZA: ICD-10-CM

## 2019-09-17 PROCEDURE — 71046 X-RAY EXAM CHEST 2 VIEWS: CPT

## 2019-09-17 PROCEDURE — 99214 OFFICE O/P EST MOD 30 MIN: CPT | Performed by: FAMILY MEDICINE

## 2019-09-17 RX ORDER — AZITHROMYCIN 250 MG/1
TABLET, FILM COATED ORAL
Qty: 6 TABLET | Refills: 0 | Status: SHIPPED | OUTPATIENT
Start: 2019-09-17 | End: 2019-11-15

## 2019-09-17 RX ORDER — PREDNISONE 20 MG/1
20 TABLET ORAL DAILY
Qty: 5 TABLET | Refills: 0 | Status: SHIPPED | OUTPATIENT
Start: 2019-09-17 | End: 2019-11-15

## 2019-09-17 ASSESSMENT — MIFFLIN-ST. JEOR: SCORE: 1957.75

## 2019-09-17 NOTE — NURSING NOTE
Injectable Influenza Immunization Documentation      1.  Is the person to be vaccinated sick today?  No    2. Does the person to be vaccinated have an allergy to eggs or to a component of the vaccine?   No      3. Has the person to be vaccinated today ever had a serious reaction to influenza vaccine in the past?  No      4. Has the person to be vaccinated ever had Guillain-Eugene syndrome?  No     Form completed by [facility]

## 2019-09-17 NOTE — TELEPHONE ENCOUNTER
After reviewing the chart, patient was seen at another clinic today by another provider. Closing encounter.    JENA LentzN, RN  [facility]

## 2019-09-17 NOTE — RESULT ENCOUNTER NOTE
David Herrera,  Your results came back and are within acceptable limits. Chest xray does not show a current pneumonia, you can choose to wait to take the zpak antibiotic unless not better. If you have any further concerns please do not hesitate to contact us by message, phone or making an appointment.  Have a good day   Dr Harjit BENNETT

## 2019-09-17 NOTE — PROGRESS NOTES
Subjective     Karolina Herrera is a 39 year old female who presents to clinic today for the following health issues:    HPI   Acute Illness   Acute illness concerns: cough  Onset: 2 weeks     Fever: no    Chills/Sweats: YES- sweats    Headache (location?): YES- frontal    Sinus Pressure:no    Conjunctivitis:  no    Ear Pain: no    Rhinorrhea: no    Congestion: no    Sore Throat: no     Cough: YES-productive of yellow sputum, with shortness of breath, worsening over time    Wheeze: YES    Decreased Appetite: no    Nausea: no    Vomiting: no    Diarrhea:  YES    Dysuria/Freq.: no    Fatigue/Achiness: YES- both    Sick/Strep Exposure: YES- whole family has same sx     Therapies Tried and outcome: delsym, inhaler, cough drops, tylenol somewhat helpful     BMI > 44, hx of gestational DM, PCOS, on metformin in past, irregular periods,  prior endometrial polypectomy, IBS, resolved low back pain, FH of breast caner in sister age 39 brca neg, hx of IVS, nasal polypectomy, wisdom teeth extraction, s/p  2nd in 2018, is breast feeding, treated for strep throat with PCN in 2019, seen by PCP Lisbeth elizondo 20 for dry cough of 1 week, low grade fever 2 days, right breast pain and post partum depression. Dx with acute bronchitis, given albuterol, thought had a plugged breast duct and advised symptomatic care and referred to psyche for depression. Called yesterday  to say was still wheezing with chest tightness. Partner also ill and immunocompromised.     Since symptoms started has felt is getting a little bit worse is wheezing on expiration and has chest tightness and that she just feels she cannot get her full breath out symptoms are worse on lying down.  Currently has no fever.  May have some headache with coughing and muscle aches has a little bit of diarrhea but nothing significant.  No chills, no dizziness, no double or blurry vision, no facial pain, earache, sore throat, runny nose, post nasal drip, no  trouble hearing, smelling, tasting or swallowing, no chest pain, trouble breathing or palpitations, No abdominal pain, heart burn, reflux, nausea or vomiting or constipation, no blood in stools or black stools, no weight loss or night sweats. No dysuria, hematuria, frequency, urgency, hesitancy, incontinence, No pelvic complaints. No leg swelling or joint pain. No rash.  Breast pain she had at last visit has resolved.    Patient Active Problem List   Diagnosis     Family history of malignant neoplasm of breast     PCOS (polycystic ovarian syndrome)     Irritable bowel syndrome without diarrhea     BMI 40.0-44.9, adult (H)     ACP (advance care planning)     History of gestational diabetes     Past Surgical History:   Procedure Laterality Date     GYN SURGERY  2014    Polypectomy     IVS      invitro fertilization     LAPAROSCOPIC CHOLECYSTECTOMY N/A 2016    Procedure: LAPAROSCOPIC CHOLECYSTECTOMY;  Surgeon: Juan F Sherman MD;  Location: [facility] OR     NASAL/SINUS POLYPECTOMY       wisdom teeth removed         Social History     Tobacco Use     Smoking status: Never Smoker     Smokeless tobacco: Never Used   Substance Use Topics     Alcohol use: Yes     Comment: 1-2 per week.     Family History   Problem Relation Age of Onset     Esophageal Cancer Maternal Grandfather          in 60s; hx of smoking     Bladder Cancer Paternal Grandfather          in 80s     Breast Cancer Sister 39        invasive ductal carcinoma, treated with lumpectomy, radiation and tamoxifen     Breast Cancer Paternal Aunt         paternal great aunt in 70s     Breast Cancer Sister         Age 39 at diagnosis         Current Outpatient Medications   Medication Sig Dispense Refill     albuterol (PROAIR HFA/PROVENTIL HFA/VENTOLIN HFA) 108 (90 Base) MCG/ACT inhaler Inhale 2 puffs into the lungs every 4 hours as needed for shortness of breath / dyspnea, wheezing or other (cough) 1 Inhaler 0     azithromycin (ZITHROMAX) 250 MG  "tablet Two tablets first day, then one tablet daily for four days. 6 tablet 0     predniSONE (DELTASONE) 20 MG tablet Take 1 tablet (20 mg) by mouth daily for 5 days 5 tablet 0     PRENATAL VIT W/ FE BISG-FA PO Reported on 4/29/2017       No Known Allergies  Recent Labs   Lab Test 10/28/17  1521 09/26/16  1536 09/02/16  0622   A1C  --  5.2  --    ALT  --   --  32   CR  --   --  0.72   GFRESTIMATED  --   --  >90  Non  GFR Calc     GFRESTBLACK  --   --  >90  African American GFR Calc     POTASSIUM  --   --  4.1   TSH 1.87 1.90  --       BP Readings from Last 3 Encounters:   09/17/19 116/82   09/13/19 114/80   05/03/18 100/72    Wt Readings from Last 3 Encounters:   09/17/19 124.5 kg (274 lb 8 oz)   09/13/19 127.5 kg (281 lb)   05/25/18 123.4 kg (272 lb)         Reviewed and updated as needed this visit by Provider         Review of Systems   ROS COMP: Constitutional, HEENT, cardiovascular, pulmonary, GI, , musculoskeletal, neuro, skin, endocrine and psych systems are negative, except as otherwise noted.      Objective    /82 (BP Location: Left arm, Patient Position: Sitting, Cuff Size: Adult Large)   Pulse 84   Temp 97  F (36.1  C) (Tympanic)   Resp 16   Ht 1.702 m (5' 7\")   Wt 124.5 kg (274 lb 8 oz)   SpO2 96%   BMI 42.99 kg/m    Body mass index is 42.99 kg/m .  Physical Exam   GENERAL: healthy, alert, no distress and obese  EYES: Eyes grossly normal to inspection, PERRL and conjunctivae and sclerae normal  HENT: ear canals and TM's normal, nose and mouth without ulcers or lesions  NECK: no adenopathy, no asymmetry, masses, or scars and thyroid normal to palpation  RESP: lungs clear to auscultation - no rales, rhonchi or wheezes, constant coughing  CV: regular rate and rhythm, normal S1 S2, no S3 or S4, no murmur, click or rub, no peripheral edema and peripheral pulses strong  ABDOMEN: soft, non tender, no hepatosplenomegaly, no masses and bowel sounds normal  MS: no gross " musculoskeletal defects noted, no edema  SKIN: no suspicious lesions or rashes  NEURO: Normal strength and tone, mentation intact and speech normal  PSYCH: mentation appears normal, affect normal/bright    Diagnostic Test Results:  Labs reviewed in Epic  No results found for this or any previous visit (from the past 24 hour(s)).      Chest x-ray pending  Assessment & Plan       ICD-10-CM    1. Acute bronchitis with symptoms > 10 days J20.9 XR Chest 2 Views     azithromycin (ZITHROMAX) 250 MG tablet     predniSONE (DELTASONE) 20 MG tablet   2. Wheezing R06.2 XR Chest 2 Views     predniSONE (DELTASONE) 20 MG tablet   3. Chest tightness R07.89 XR Chest 2 Views     predniSONE (DELTASONE) 20 MG tablet   4. Patient is a currently breast-feeding mother Z39.1    5. Need for prophylactic vaccination and inoculation against influenza Z23      Here today with cough and chest tightness started with some mild fever about 2 weeks ago that has gotten worse.  Vitals and hemodynamically stable SATs normal.  Constantly coughing.  Is breast-feeding the child is 10 months old and not dependent just on breast milk and she has some frozen breast milk at home as well.  Has been using albuterol every 4 hours and still having symptoms.  We will do a chest x-ray today to rule out walking pneumonia.  We will do Z-Jamel for empirical bacterial treatment but likely may still just be a viral and symptomatic treatment recommended.  Discussed use of prednisone to help with inflammation wheezing and chest tightness.  Discussed its safe but some studies and others it does or may affect growth but if taking for short periods of time benefit may be worth the risk given her situation.  She is able to pump and dump and recommended at least not to breast-feed for 4 hours after taking the prednisone.  Continue albuterol 2 puffs every 6 hrs next few days then as needed for cough, shortness of breath, chest tightness  supportive measures( tylenol, ibuprofen,  "steam, cough drops, sit up on sleeping)  Chest xray today will send result by [email or web address]  Go to the ER if worse  Return to PCP Kiran for preventive and flu vaccine when better.  Giving flu vaccine today is prescribing prednisone which may decrease its efficacy.  Later asked through wife seen later about waiting on abx so advised could try prednisone and if not better take the zpak.   BMI:   Estimated body mass index is 42.99 kg/m  as calculated from the following:    Height as of this encounter: 1.702 m (5' 7\").    Weight as of this encounter: 124.5 kg (274 lb 8 oz).     Regular exercise  See Patient Instructions    Return in about 4 weeks (around 10/15/2019) for Physical Exam with PCP & jacklier PRn if not better.    Smitha Lombardi MD  [facility]        "

## 2019-09-17 NOTE — PATIENT INSTRUCTIONS
zpak   Continue albuterol 2 puffs every 6 hrs next few days then as needed for coughm shortness of breath, chest tightness  supportive measures( tylenol, ibuprofen, steam, cough drops, sit up on sleeping)  Can consider prednisone, advise dto pump and dump or breast feed 4 hrs after taking dose, steroids can affect growth but also some studies say it is safe.   Chest xray today will send result by [place]  Go to the ER if worse  Return to PCP Kiran for preventive and flu vaccine when better

## 2019-11-08 ENCOUNTER — HEALTH MAINTENANCE LETTER (OUTPATIENT)
Age: 39
End: 2019-11-08

## 2019-11-15 ENCOUNTER — OFFICE VISIT (OUTPATIENT)
Dept: FAMILY MEDICINE | Facility: CLINIC | Age: 39
End: 2019-11-15
Payer: COMMERCIAL

## 2019-11-15 VITALS
BODY MASS INDEX: 44.54 KG/M2 | OXYGEN SATURATION: 96 % | SYSTOLIC BLOOD PRESSURE: 123 MMHG | HEART RATE: 104 BPM | DIASTOLIC BLOOD PRESSURE: 71 MMHG | TEMPERATURE: 98.2 F | WEIGHT: 284.4 LBS | RESPIRATION RATE: 18 BRPM

## 2019-11-15 DIAGNOSIS — R06.2 WHEEZING: ICD-10-CM

## 2019-11-15 DIAGNOSIS — R05.9 COUGH: Primary | ICD-10-CM

## 2019-11-15 DIAGNOSIS — J20.9 ACUTE BRONCHITIS WITH SYMPTOMS > 10 DAYS: ICD-10-CM

## 2019-11-15 DIAGNOSIS — R07.89 CHEST TIGHTNESS: ICD-10-CM

## 2019-11-15 LAB
ERYTHROCYTE [DISTWIDTH] IN BLOOD BY AUTOMATED COUNT: 13 % (ref 10–15)
HCT VFR BLD AUTO: 42.2 % (ref 35–47)
HGB BLD-MCNC: 13.6 G/DL (ref 11.7–15.7)
MCH RBC QN AUTO: 29.1 PG (ref 26.5–33)
MCHC RBC AUTO-ENTMCNC: 32.2 G/DL (ref 31.5–36.5)
MCV RBC AUTO: 90 FL (ref 78–100)
PLATELET # BLD AUTO: 245 10E9/L (ref 150–450)
RBC # BLD AUTO: 4.68 10E12/L (ref 3.8–5.2)
WBC # BLD AUTO: 9.4 10E9/L (ref 4–11)

## 2019-11-15 PROCEDURE — 99213 OFFICE O/P EST LOW 20 MIN: CPT | Performed by: NURSE PRACTITIONER

## 2019-11-15 PROCEDURE — 85027 COMPLETE CBC AUTOMATED: CPT | Performed by: NURSE PRACTITIONER

## 2019-11-15 PROCEDURE — 36415 COLL VENOUS BLD VENIPUNCTURE: CPT | Performed by: NURSE PRACTITIONER

## 2019-11-15 RX ORDER — PREDNISONE 20 MG/1
20 TABLET ORAL DAILY
Qty: 5 TABLET | Refills: 0 | Status: SHIPPED | OUTPATIENT
Start: 2019-11-15 | End: 2019-11-22

## 2019-11-15 NOTE — PROGRESS NOTES
Subjective     Karolina Herrera is a 39 year old female who presents to clinic today for the following health issues:    HPI   RESPIRATORY SYMPTOMS      Duration: Sep 1    Description  nasal congestion, cough and wheezing    Severity: moderate    Accompanying signs and symptoms: None    History (predisposing factors):  none    Precipitating or alleviating factors: None    Therapies tried and outcome:  rest and fluids    Took zpak and prednisone which helped temporarily. Then came right back worse.    Still coughing with some wheezing.  Lots of congestion.    No fevers.    OTC meds not helping.    Breastfeeding 12 month old at home.        Reviewed and updated as needed this visit by Provider  Tobacco  Allergies  Meds  Problems  Med Hx  Surg Hx  Fam Hx         Review of Systems   ROS COMP: Constitutional, HEENT, cardiovascular, pulmonary, gi and gu systems are negative, except as otherwise noted.      Objective    /71   Pulse 104   Temp 98.2  F (36.8  C) (Oral)   Resp 18   Wt 129 kg (284 lb 6.4 oz)   SpO2 96%   BMI 44.54 kg/m    Body mass index is 44.54 kg/m .  Physical Exam   GENERAL: healthy, alert and no distress  EYES: Eyes grossly normal to inspection, PERRL and conjunctivae and sclerae normal  HENT: ear canals and TM's normal, nose and mouth without ulcers or lesions  NECK: no adenopathy, no asymmetry, masses, or scars and thyroid normal to palpation  RESP: lungs clear to auscultation - no rales, rhonchi or wheezes and harsh dry cough  CV: regular rate and rhythm, normal S1 S2, no S3 or S4, no murmur, click or rub, no peripheral edema and peripheral pulses strong  SKIN: no suspicious lesions or rashes    Diagnostic Test Results:  Labs reviewed in Epic  Results for orders placed or performed in visit on 11/15/19 (from the past 24 hour(s))   CBC with platelets   Result Value Ref Range    WBC 9.4 4.0 - 11.0 10e9/L    RBC Count 4.68 3.8 - 5.2 10e12/L    Hemoglobin 13.6 11.7 - 15.7 g/dL     "Hematocrit 42.2 35.0 - 47.0 %    MCV 90 78 - 100 fl    MCH 29.1 26.5 - 33.0 pg    MCHC 32.2 31.5 - 36.5 g/dL    RDW 13.0 10.0 - 15.0 %    Platelet Count 245 150 - 450 10e9/L           Assessment & Plan       ICD-10-CM    1. Cough R05 CBC with platelets   2. Acute bronchitis with symptoms > 10 days J20.9 predniSONE (DELTASONE) 20 MG tablet   3. Wheezing R06.2 predniSONE (DELTASONE) 20 MG tablet   4. Chest tightness R07.89 predniSONE (DELTASONE) 20 MG tablet     I think this is primarily related to a viral illness given the various associated symptoms.  Went over the treatment of viral illnesses, which is primarily supportive.    Recheck if not improving as expected  Clinic did not have XR available at the visit.    Recommend f/u in 1 week if cough persists. Or worsens.     BMI:   Estimated body mass index is 44.54 kg/m  as calculated from the following:    Height as of 9/17/19: 1.702 m (5' 7\").    Weight as of this encounter: 129 kg (284 lb 6.4 oz).       No follow-ups on file.    MIKEL Ram [facility]    "

## 2019-11-22 ENCOUNTER — OFFICE VISIT (OUTPATIENT)
Dept: FAMILY MEDICINE | Facility: CLINIC | Age: 39
End: 2019-11-22
Payer: COMMERCIAL

## 2019-11-22 VITALS
DIASTOLIC BLOOD PRESSURE: 74 MMHG | SYSTOLIC BLOOD PRESSURE: 112 MMHG | OXYGEN SATURATION: 96 % | TEMPERATURE: 97.2 F | BODY MASS INDEX: 44.64 KG/M2 | WEIGHT: 285 LBS | HEART RATE: 83 BPM

## 2019-11-22 DIAGNOSIS — R05.3 CHRONIC COUGH: Primary | ICD-10-CM

## 2019-11-22 DIAGNOSIS — J06.9 UPPER RESPIRATORY INFECTION: Primary | ICD-10-CM

## 2019-11-22 PROCEDURE — 99213 OFFICE O/P EST LOW 20 MIN: CPT | Performed by: PHYSICIAN ASSISTANT

## 2019-11-25 NOTE — TELEPHONE ENCOUNTER
RECORDS RECEIVED FROM: internal    DATE RECEIVED: 2.10.20   NOTES STATUS DETAILS   OFFICE NOTE from referring provider Internal 11.22.19 Nancy Anne PA-C   OFFICE NOTE from other specialist Internal 9/17/19   DISCHARGE SUMMARY from hospital N/A    DISCHARGE REPORT from the ER N/A    MEDICATION LIST Internal    IMAGING  (NEED IMAGES AND REPORTS)     CT SCAN N/A    CHEST XRAY (CXR) Internal/ in process  9/17/19,   2.10.20-scheduled   TESTS     PULMONARY FUNCTION TESTING (PFT) Internal/ in process  2.10.20-scheduled

## 2019-12-10 ENCOUNTER — TRANSFERRED RECORDS (OUTPATIENT)
Dept: HEALTH INFORMATION MANAGEMENT | Facility: CLINIC | Age: 39
End: 2019-12-10

## 2020-02-04 NOTE — PROGRESS NOTES
Oncology Risk Management Consultation:  Date on this visit: 2020    Karolina Herrera returns to the [facility] Cancer clinic today for a six month follow up.    She requires heightened screening and surveillance for her higher risk of breast cancer, secondary to a  family history of invasive ductal carcinoma of the breast in her sister at age 39.     By report, her sister had BRCA1 and BRCA2 testing and was found to be negative.      Karolina is considered to at high risk for breast cancer and has a 24.4% lifetime risk for breast cancer by the TIEN model. Her risk within the next 5 years is 1.4% by TIEN.    Primary Physician:  PELON Lopez    History Of Present Illness:  Ms. Herrera is a very pleasant, healthy  40 year old female who presents with family history of breast cancer.     Genetic testing:  No genetic testing to date. Her sister, who had invasive ductal carcinoma at age 39, by report, was negative for genetic mutations in the BRCA1 and BRCA2 genes and Karolina has not had genetic testing.     Pertinent health history:    Menarche at 12.  First child at age 35, conceived using one cycle of IVF.   Premenopausal.  Ovaries and uterus intact.   LMP: January, first cycle since having Celia in November  OCP use for one year.  Polycystic ovarian syndrome  Scattered fibroglandular densities.  Hx of mastitis in  with breastfeeding, resolved with antibiotics.  Hx of right breast pain with lactation. No infection 2019; treated with tylenol and ibuprofen. Suspected clogged milk duct.  History of breastfeeding x 13 months for Huy and 3 months thus far for Celia (nursing very little morning and evening and not pumping)  No history of breast biopsy.  No history of hyperplasia, atypia or malignancy    Pertinent Screening History:  2016 - Screening mammogram, [email or web address]  2017 - Breast MRI, [email or web address]  2017 - Screening mammogram, BiRads1  2019 - Breast MRI, BiRads1    At this visit,  she denies asymmetry, lumps, masses, thickening, pain, nipple discharge and skin changes.      Past Medical/Surgical History:  Past Medical History:   Diagnosis Date     Abnormal vaginal bleeding 2008 and 2009     At high risk for breast cancer      Encounter for gynecological examination without abnormal finding 10/19/2015    Overview:  Wife is Peyton.  Both are . Pap neg 6/10/ 2013: record in Care Everywhere from [facility]      Family history of malignant neoplasm of breast      High risk pregnancy, antepartum 3/30/2018    Overview:  Prenatal provider & planned delivery site: [facility] [place] [facility] Wife is Peyton.   IVF through [facility] with donor from sperm bank.  Same donor as first pregnancy with their son Huy.  Labs and records requested from [facility].   Initial prenatal bloodwork ___  GC/CT ___- These labs were deferred to wait for records from [facility].   Pregravid BMI >40.  Early GCT ____ UC done.  Pap up to date 7/17 Geneti     Low back pain 6/18/2014     Morbid obesity due to excess calories (H) 3/25/2016     Obesity with body mass index 30 or greater 12/31/2014     Past Surgical History:   Procedure Laterality Date     GYN SURGERY  7/2014    Polypectomy     IVS  2015    invitro fertilization     LAPAROSCOPIC CHOLECYSTECTOMY N/A 11/7/2016    Procedure: LAPAROSCOPIC CHOLECYSTECTOMY;  Surgeon: Juan F Sherman MD;  [facility]     NASAL/SINUS POLYPECTOMY       wisdom teeth removed         Allergies:  Allergies as of 02/07/2020     (No Known Allergies)       Current Medications:  Current Outpatient Medications   Medication Sig Dispense Refill     albuterol (PROAIR HFA/PROVENTIL HFA/VENTOLIN HFA) 108 (90 Base) MCG/ACT inhaler Inhale 2 puffs into the lungs every 4 hours as needed for shortness of breath / dyspnea, wheezing or other (cough) 1 Inhaler 0     Multiple Vitamins-Minerals (MULTIVITAMIN ADULT PO) Take 1 tablet by mouth       sertraline (ZOLOFT) 25 MG tablet           Family History:  Family History    Problem Relation Age of Onset     Esophageal Cancer Maternal Grandfather          in 60s; hx of smoking     Bladder Cancer Paternal Grandfather          in 80s     Breast Cancer Sister 39        invasive ductal carcinoma, treated with lumpectomy, radiation and tamoxifen     Breast Cancer Paternal Aunt         paternal great aunt in 70s     Breast Cancer Sister         Age 39 at diagnosis       Social History:  Social History     Socioeconomic History     Marital status:      Spouse name: Samantha     Number of children: 2     Years of education: Not on file     Highest education level: Not on file   Occupational History     Comment: works for [employer]   Social Needs     Financial resource strain: Not on file     Food insecurity:     Worry: Not on file     Inability: Not on file     Transportation needs:     Medical: Not on file     Non-medical: Not on file   Tobacco Use     Smoking status: Never Smoker     Smokeless tobacco: Never Used   Substance and Sexual Activity     Alcohol use: Yes     Comment: 1-2 per week.     Drug use: No     Sexual activity: Yes     Partners: Female     Birth control/protection: None   Lifestyle     Physical activity:     Days per week: Not on file     Minutes per session: Not on file     Stress: Not on file   Relationships     Social connections:     Talks on phone: Not on file     Gets together: Not on file     Attends Judaism service: Not on file     Active member of club or organization: Not on file     Attends meetings of clubs or organizations: Not on file     Relationship status: Not on file     Intimate partner violence:     Fear of current or ex partner: Not on file     Emotionally abused: Not on file     Physically abused: Not on file     Forced sexual activity: Not on file   Other Topics Concern      Service No     Blood Transfusions No     Caffeine Concern No     Occupational Exposure No     Hobby Hazards No     Sleep Concern No     Stress  Concern No     Weight Concern No     Special Diet No     Back Care No     Exercise Yes     Comment: walks dog 2-3 times/day; prenatal exercises     Bike Helmet Not Asked     Seat Belt Yes     Self-Exams Yes     Parent/sibling w/ CABG, MI or angioplasty before 65F 55M? No   Social History Narrative     Not on file       Physical Exam:  /81   Pulse 83   Temp 98.6  F (37  C) (Oral)   Resp 16   Wt 129.7 kg (286 lb)   LMP 01/08/2020 (Approximate)   SpO2 96%   Breastfeeding Yes   BMI 44.79 kg/m      GENERAL APPEARANCE: healthy, alert and no distress     NECK: no adenopathy, no asymmetry or masses     LYMPHATICS: No cervical, supraclavicular, axillary or inguinal lymphadenopathy     RESP: lungs clear to auscultation - no rales, rhonchi or wheezes     CARDIOVASCULAR: regular rate and rhythm, normal S1 S2, no S3 or S4 and no murmur.   BREAST: A multi positional, bilateral breast exam was performed.  Fairly symmetrical pendulous breasts. Right breast: no palpable dominant masses, no nipple discharge, no skin changes. Soft tissue with minor areas of fibrocystic changes at 11 o'clock.  Right axilla: no palpable adenopathy. Left breast: no palpable dominant masses, no nipple discharge, no skin changes. Left axilla: no palpable adenopathy. Soft tissue.    SKIN: no suspicious lesions or rashes on anterior torso, upper extremities or face.    Laboratory/Imaging Studies  No results found for any visits on 02/07/20.    ASSESSMENT  We discussed her last screening tests and reviewed results.  She has been doing well and has no breasts complaints. She is still breastfeeding in the morning and evening but not pumping milk; she states she has had no issues other than a clogged milk duct during this lactation period. She is busy managing care with her toddler and preschooler and ready to resume breast screening. Her exam today is unremarkable. She will proceed with the following:    INDIVIDUALIZED CANCER RISK MANAGEMENT  PLAN:  Individualized Surveillance Plan for women  With 20% or greater lifetime risk of breast cancer   Per NCCN Breast Cancer Screening and Diagnosis Guidelines Version 2.2018    Recommended screening Test or procedure Last done Next Scheduled    Clinical encounter Ongoing risk assessment, risk reduction counseling and clinical breast exam, every 6-12 months. Refer to genetic counseling if not already done.   2/7/2020 Feb. 2021   However, some family histories with breast cancers at a very young age, may warrant screening starting earlier.    *May begin at age 40 if breast cancers in the family occur at later ages.    Annual mammogram beginning 10 years younger than the earliest breast cancer in the family but not prior to age 30.    Recommend annual breast MRI to begin 10 years younger than the earliest breast cancer in the family but not prior to age 25.    Breast MRIs are preferably done on day 7-15 of the menstrual cycle in premenopausal women. 11/17/2017 - Screening mammogram, BiRads1    8/8/2019 - Breast MRI, BiRads1    Mammogram after visit today Breast MRI in August (no sooner than 8/9)    Next exam: Feb. 2021 followed by tomosynthesis mammogram   Breast screening for patients at high risk due to thoracic radiation before 30 years old    Begin 10 years post radiation treatment or age 25.   Annual mammogram beginning 10 years after radiation but not prior to age 30    Annual breast MRI, preferably on day 7-15 of menstrual cycle for premenopausal women.       NA     NA   Women who have a lifetime risk of >20% based on history of LCIS or ADH/ALH Annual screening mammogram beginning at age of LCIS or ADH/ALH but not prior to age 30.    Consider annual MRI to begin at age of diagnosis of LCIS or ADH/ALH but not prior to age 25.    Consider risk reducing strategies.     NA     NA    Recommend risk reducing strategies for women with 1.7% 5 year risk of breast cancer.       I spent 25 minutes with the patient  with greater that 50% of it in counseling and coordinating care as documented above.    MIKEL Richey-CNS, OCN, AGN-BC  Clinical Nurse Specialist  Cancer Risk Management Program  [facility] [address]  [place] [address]    phone:  [phone]  Pager: [phone]  fax: [phone]    CC: MIKEL Lopez-NP

## 2020-02-07 ENCOUNTER — ONCOLOGY VISIT (OUTPATIENT)
Dept: ONCOLOGY | Facility: CLINIC | Age: 40
End: 2020-02-07
Attending: CLINICAL NURSE SPECIALIST
Payer: COMMERCIAL

## 2020-02-07 ENCOUNTER — ANCILLARY PROCEDURE (OUTPATIENT)
Dept: MAMMOGRAPHY | Facility: CLINIC | Age: 40
End: 2020-02-07
Attending: CLINICAL NURSE SPECIALIST
Payer: COMMERCIAL

## 2020-02-07 VITALS
HEART RATE: 83 BPM | SYSTOLIC BLOOD PRESSURE: 115 MMHG | TEMPERATURE: 98.6 F | WEIGHT: 286 LBS | BODY MASS INDEX: 44.79 KG/M2 | OXYGEN SATURATION: 96 % | DIASTOLIC BLOOD PRESSURE: 81 MMHG | RESPIRATION RATE: 16 BRPM

## 2020-02-07 DIAGNOSIS — Z12.39 BREAST CANCER SCREENING, HIGH RISK PATIENT: ICD-10-CM

## 2020-02-07 DIAGNOSIS — Z91.89 AT HIGH RISK FOR BREAST CANCER: ICD-10-CM

## 2020-02-07 DIAGNOSIS — Z12.39 BREAST CANCER SCREENING, HIGH RISK PATIENT: Primary | ICD-10-CM

## 2020-02-07 PROCEDURE — G0463 HOSPITAL OUTPT CLINIC VISIT: HCPCS | Mod: ZF

## 2020-02-07 PROCEDURE — 99214 OFFICE O/P EST MOD 30 MIN: CPT | Mod: ZP | Performed by: CLINICAL NURSE SPECIALIST

## 2020-02-07 RX ORDER — SERTRALINE HYDROCHLORIDE 25 MG/1
TABLET, FILM COATED ORAL
COMMUNITY
Start: 2020-01-05 | End: 2022-08-22 | Stop reason: DRUGHIGH

## 2020-02-07 ASSESSMENT — PAIN SCALES - GENERAL: PAINLEVEL: NO PAIN (0)

## 2020-02-07 NOTE — PATIENT INSTRUCTIONS
Individualized Surveillance Plan for women  With 20% or greater lifetime risk of breast cancer   Per NCCN Breast Cancer Screening and Diagnosis Guidelines Version 2.2018    Recommended screening Test or procedure Last done Next Scheduled    Clinical encounter Ongoing risk assessment, risk reduction counseling and clinical breast exam, every 6-12 months. Refer to genetic counseling if not already done.   2/7/2020 Feb. 2021   However, some family histories with breast cancers at a very young age, may warrant screening starting earlier.    *May begin at age 40 if breast cancers in the family occur at later ages.    Annual mammogram beginning 10 years younger than the earliest breast cancer in the family but not prior to age 30.    Recommend annual breast MRI to begin 10 years younger than the earliest breast cancer in the family but not prior to age 25.    Breast MRIs are preferably done on day 7-15 of the menstrual cycle in premenopausal women. 11/17/2017 - Screening mammogram, BiRads1    8/8/2019 - Breast MRI, [email or web address]    Mammogram after visit today Breast MRI in August (no sooner than 8/9)    Next exam: Feb. 2021 followed by tomosynthesis mammogram   Breast screening for patients at high risk due to thoracic radiation before 30 years old    Begin 10 years post radiation treatment or age 25.   Annual mammogram beginning 10 years after radiation but not prior to age 30    Annual breast MRI, preferably on day 7-15 of menstrual cycle for premenopausal women.       NA     NA   Women who have a lifetime risk of >20% based on history of LCIS or ADH/ALH Annual screening mammogram beginning at age of LCIS or ADH/ALH but not prior to age 30.    Consider annual MRI to begin at age of diagnosis of LCIS or ADH/ALH but not prior to age 25.    Consider risk reducing strategies.     NA     NA    Recommend risk reducing strategies for women with 1.7% 5 year risk of breast cancer.

## 2020-02-07 NOTE — Clinical Note
2020       RE: Karolina Herrera  [address]  [facility] [address]     Dear Colleague,    Thank you for referring your patient, Karolina Herrera, to the [facility]. Please see a copy of my visit note below.    Oncology Risk Management Consultation:  Date on this visit: 2020    Karolina Herrera returns to the [facility] Cancer clinic today for a six month follow up.  She requires heightened screening and surveillance for her higher risk of breast cancer, secondary to a  family history of invasive ductal carcinoma of the breast in her sister at age 39.       By report, her sister had BRCA1 and BRCA2 testing and was found to be negative.        Karolina is considered to at high risk for breast cancer and has a 23.1% lifetime risk for breast cancer by the TIEN model.     Primary Physician:  PELON Lopez    History Of Present Illness:  Ms. Herrera is a very pleasant, healthy  40 year old female who presents with family history of breast cancer.     Genetic testing:  No genetic testing to date. Her sister, who had invasive ductal carcinoma at age 39, by report, was negative for genetic mutations in the BRCA1 and BRCA2 genes and Karolina has not had genetic testing.     Pertinent health history:    Menarche at 12.  First child at age 35, conceived using one cycle of IVF.   Premenopausal.  Ovaries and uterus intact.   OCP use for one year.  Hx of mastitis in  with breastfeeding, resolved with antibiotics.  Hx of right breast pain with lactation. No infection 2019; treated with tylenol and ibuprofin. Suspected clogged milk duct.  History of breastfeeding x13 months.  No history of breast screening.  No history of breast disease, atypia, malignancy.    No history of breast biopsy.  Polycystic ovarian syndrome  Scattered fibroglandular densities.    Pertinent Screening History:  2016 - Screening mammogram, BiRads1  2017 - Breast MRI, BiRads1  2017 -  Screening mammogram, BiRads1  8/8/2019 - Breast MRI, BiRads1    At this visit, she denies asymmetry, lumps, masses, thickening, pain, nipple discharge and skin changes.  She acknowledges ________________________.     Past Medical/Surgical History:  Past Medical History:   Diagnosis Date     Abnormal vaginal bleeding 2008 and 2009     At high risk for breast cancer      Encounter for gynecological examination without abnormal finding 10/19/2015    Overview:  Wife is Peyton.  Both are . Pap neg 6/10/ 2013: record in Care Everywhere from [facility]      Family history of malignant neoplasm of breast      High risk pregnancy, antepartum 3/30/2018    Overview:  Prenatal provider & planned delivery site: [facility] Wife is Peyton.   IVF through [facility] with donor from sperm bank.  Same donor as first pregnancy with their son Huy.  Labs and records requested from [facility].   Initial prenatal bloodwork ___  GC/CT ___- These labs were deferred to wait for records from [facility].   Pregravid BMI >40.  Early GCT ____ UC done.  Pap up to date 7/17 Geneti     Low back pain 6/18/2014     Morbid obesity due to excess calories (H) 3/25/2016     Obesity with body mass index 30 or greater 12/31/2014     Past Surgical History:   Procedure Laterality Date     GYN SURGERY  7/2014    Polypectomy     IVS  2015    invitro fertilization     LAPAROSCOPIC CHOLECYSTECTOMY N/A 11/7/2016    Procedure: LAPAROSCOPIC CHOLECYSTECTOMY;  Surgeon: Juan F Sherman MD;  [facility]     NASAL/SINUS POLYPECTOMY       wisdom teeth removed         Allergies:  Allergies as of 02/07/2020     (No Known Allergies)       Current Medications:  Current Outpatient Medications   Medication Sig Dispense Refill     albuterol (PROAIR HFA/PROVENTIL HFA/VENTOLIN HFA) 108 (90 Base) MCG/ACT inhaler Inhale 2 puffs into the lungs every 4 hours as needed for shortness of breath / dyspnea, wheezing or other (cough) 1 Inhaler 0     PRENATAL VIT W/ FE BISG-FA PO Reported  on 2017          Family History:  Family History   Problem Relation Age of Onset     Esophageal Cancer Maternal Grandfather          in 60s; hx of smoking     Bladder Cancer Paternal Grandfather          in 80s     Breast Cancer Sister 39        invasive ductal carcinoma, treated with lumpectomy, radiation and tamoxifen     Breast Cancer Paternal Aunt         paternal great aunt in 70s     Breast Cancer Sister         Age 39 at diagnosis       Social History:  Social History     Socioeconomic History     Marital status:      Spouse name: Samantha     Number of children: 1     Years of education: Not on file     Highest education level: Not on file   Occupational History     Comment: works for [employer]   Social Needs     Financial resource strain: Not on file     Food insecurity:     Worry: Not on file     Inability: Not on file     Transportation needs:     Medical: Not on file     Non-medical: Not on file   Tobacco Use     Smoking status: Never Smoker     Smokeless tobacco: Never Used   Substance and Sexual Activity     Alcohol use: Yes     Comment: 1-2 per week.     Drug use: No     Sexual activity: Yes     Partners: Female     Birth control/protection: None   Lifestyle     Physical activity:     Days per week: Not on file     Minutes per session: Not on file     Stress: Not on file   Relationships     Social connections:     Talks on phone: Not on file     Gets together: Not on file     Attends Latter day service: Not on file     Active member of club or organization: Not on file     Attends meetings of clubs or organizations: Not on file     Relationship status: Not on file     Intimate partner violence:     Fear of current or ex partner: Not on file     Emotionally abused: Not on file     Physically abused: Not on file     Forced sexual activity: Not on file   Other Topics Concern      Service No     Blood Transfusions No     Caffeine Concern No     Occupational Exposure No  "    Hobby Hazards No     Sleep Concern No     Stress Concern No     Weight Concern No     Special Diet No     Back Care No     Exercise Yes     Comment: walks dog 2-3 times/day; prenatal exercises     Bike Helmet Not Asked     Seat Belt Yes     Self-Exams Yes     Parent/sibling w/ CABG, MI or angioplasty before 65F 55M? No   Social History Narrative     Not on file       Physical Exam:  There were no vitals taken for this visit.  {EXAM COMPLETE FEMALE:[id number]::\"  GENERAL APPEARANCE: healthy, alert and no apparent distress, except for the concern of her risk of cancer\",\"   CARDIOVASCULAR: regular rate and rhythm, normal S1 S2, no S3 or S4 and no murmur. Pulses +1 in all extremities\",\"   ABDOMEN:  soft, nontender, no masses and bowel sounds present in all 4 quadrants\",\"     SKIN: no suspicious lesions or rashes\",\"   HENT: Mouth without ulcers or lesions\",\"   NECK: no adenopathy, no asymmetry or masses\",\"   LYMPHATICS: No cervical, supraclavicular, axillary or inguinal lymphadenopathy\",\"   RESP: lungs clear to auscultation - no rales, rhonchi or wheezes\"}    Laboratory/Imaging Studies  No results found for any visits on 02/07/20.    ASSESSMENT  We discussed her last screening tests and reviewed results.  We discussed concerns and symptoms s      We also reviewed the current healthy lifestyle recommendations by  NCCN and the American Cancer Society that can reduce the risk of breast cancer including:    Breast Cancer Prevention (Faraz et al 2007)   Breast feeding (months)  ?16 Risk reduction up to 27%     Childbearing ?5 Risk reduction up to 29%    Recreational exercise Yes Risk reduction up to 30%    Postmenopause body mass index (kg/m2) <22.9 Risk reduction up to 39%    Oophorectomy before age 35 years  Yes Risk reduction up to 70%    Aspirin ?Once/week for ?6 months Risk reduction up to 21%        INDIVIDUALIZED CANCER RISK MANAGEMENT PLAN:  Individualized Surveillance Plan for women  With 20% or greater lifetime " risk of breast cancer   Per NCCN Breast Cancer Screening and Diagnosis Guidelines Version 2.2018    Recommended screening Test or procedure Last done Next Scheduled    Clinical encounter Ongoing risk assessment, risk reduction counseling and clinical breast exam, every 6-12 months. Refer to genetic counseling if not already done.   2/7/2020 Feb. 2021   However, some family histories with breast cancers at a very young age, may warrant screening starting earlier.    *May begin at age 40 if breast cancers in the family occur at later ages.    Annual mammogram beginning 10 years younger than the earliest breast cancer in the family but not prior to age 30.    Recommend annual breast MRI to begin 10 years younger than the earliest breast cancer in the family but not prior to age 25.    Breast MRIs are preferably done on day 7-15 of the menstrual cycle in premenopausal women. 11/17/2017 - Screening mammogram, BiRads1    8/8/2019 - Breast MRI, BiRads1    Mammogram after visit today Breast MRI in August (no sooner than 8/9)    Next exam: Feb. 2021 followed by tomosynthesis mammogram   Breast screening for patients at high risk due to thoracic radiation before 30 years old    Begin 10 years post radiation treatment or age 25.   Annual mammogram beginning 10 years after radiation but not prior to age 30    Annual breast MRI, preferably on day 7-15 of menstrual cycle for premenopausal women.       NA     NA   Women who have a lifetime risk of >20% based on history of LCIS or ADH/ALH Annual screening mammogram beginning at age of LCIS or ADH/ALH but not prior to age 30.    Consider annual MRI to begin at age of diagnosis of LCIS or ADH/ALH but not prior to age 25.    Consider risk reducing strategies.     NA     NA    Recommend risk reducing strategies for women with 1.7% 5 year risk of breast cancer.                       I spent xx minutes with the patient with greater that 50% of it in counseling and coordinating care as  documented above.                          Again, thank you for allowing me to participate in the care of your patient.      Sincerely,    MIKEL Richey CNS

## 2020-02-07 NOTE — NURSING NOTE
"Oncology Rooming Note    February 7, 2020 10:13 AM   Karolina Herrera is a 40 year old female who presents for:    Chief Complaint   Patient presents with     Oncology Clinic Visit     Return High Risk Breast Ca     Initial Vitals: /81   Pulse 83   Temp 98.6  F (37  C) (Oral)   Resp 16   Wt 129.7 kg (286 lb)   LMP 01/08/2020 (Approximate)   SpO2 96%   Breastfeeding Yes   BMI 44.79 kg/m   Estimated body mass index is 44.79 kg/m  as calculated from the following:    Height as of 9/17/19: 1.702 m (5' 7\").    Weight as of this encounter: 129.7 kg (286 lb). Body surface area is 2.48 meters squared.  No Pain (0) Comment: Data Unavailable   Patient's last menstrual period was 01/08/2020 (approximate).  Allergies reviewed: Yes  Medications reviewed: Yes    Medications: Medication refills not needed today.  Pharmacy name entered into [employer]: [id number] [place], [place] - [address] [place] [address]    Clinical concerns: No new concerns.         Devika Escalante, DAVINA              " Preceptor Attestation:   Patient seen, evaluated and discussed with the resident. I have verified the content of the note, which accurately reflects my assessment of the patient and the plan of care.   Supervising Physician:  Carole Potter MD

## 2020-02-07 NOTE — LETTER
Cancer Risk Management  Program Locations    [facility]  [facility]  [facility]  [facility]  Mailing Address  Cancer Risk Management Program  [facility]  [address]  [place] [address]    New patient appointments  [phone]  2020    Karolina Herrera  [address]  [facility] [address]      Dear Karolina,    It was a pleasure meeting with you today.  Below is a copy of my note from our visit, outlining your surveillance plan.      I look forward to seeing you in the future to coordinate your care and reduce your health risks. Please feel free to contact me if you have any questions or concerns.      Oncology Risk Management Consultation:  Date on this visit: 2020    Karolina Herrera returns to the [facility] clinic today for a six month follow up.    She requires heightened screening and surveillance for her higher risk of breast cancer, secondary to a  family history of invasive ductal carcinoma of the breast in her sister at age 39.     By report, her sister had BRCA1 and BRCA2 testing and was found to be negative.      Karolina is considered to at high risk for breast cancer and has a 24.4% lifetime risk for breast cancer by the TIEN model.  Her risk within the next 5 years is 1.4% by TIEN.    Primary Physician:  PELON Lopez    History Of Present Illness:  Ms. Herrera is a very pleasant, healthy  40 year old female who presents with family history of breast cancer.     Genetic testing:  No genetic testing to date. Her sister, who had invasive ductal carcinoma at age 39, by report, was negative for genetic mutations in the BRCA1 and BRCA2 genes and Karolina has not had genetic testing.     Pertinent health history:    Menarche at 12.  First child at age 35, conceived using one cycle of IVF.    Premenopausal.  Ovaries and uterus intact.   LMP: January, first cycle since having Celia in November  OCP use for one year.  Polycystic ovarian syndrome  Scattered fibroglandular densities.  Hx of mastitis in 2016 with breastfeeding, resolved with antibiotics.  Hx of right breast pain with lactation. No infection 7/11/2019; treated with tylenol and ibuprofen. Suspected clogged milk duct.  History of breastfeeding  x 13 months  for Huy and 3 months thus far for Celia (nursing very little morning and evening and not pumping)  No history of breast biopsy.  No history of hyperplasia, atypia or malignancy    Pertinent Screening History:  11/16/2016 - Screening mammogram, [email or web address]  5/26/2017 - Breast MRI, [email or web address]  11/17/2017 - Screening mammogram, BiRads1  8/8/2019 - Breast MRI, BiRads1    At this visit, she denies asymmetry, lumps, masses, thickening, pain, nipple discharge and skin changes.      Past Medical/Surgical History:  Past Medical History:   Diagnosis Date     Abnormal vaginal bleeding 2008 and 2009     At high risk for breast cancer      Encounter for gynecological examination without abnormal finding 10/19/2015    Overview:  Wife is Peyton.  Both are . Pap neg 6/10/ 2013: record in Care Everywhere from [facility]      Family history of malignant neoplasm of breast      High risk pregnancy, antepartum 3/30/2018    Overview:  Prenatal provider & planned delivery site: [facility] Wife is Peyton.   IVF through [facility] with donor from sperm bank.  Same donor as first pregnancy with their son Huy.  Labs and records requested from [facility].   Initial prenatal bloodwork ___  GC/CT ___- These labs were deferred to wait for records from [facility].   Pregravid BMI >40.  Early GCT ____ UC done.  Pap up to date 7/17 Geneti     Low back pain 6/18/2014     Morbid obesity due to excess calories (H) 3/25/2016     Obesity with body mass index 30 or greater 12/31/2014     Past Surgical History:   Procedure Laterality Date      GYN SURGERY  2014    Polypectomy     IVS      invitro fertilization     LAPAROSCOPIC CHOLECYSTECTOMY N/A 2016    Procedure: LAPAROSCOPIC CHOLECYSTECTOMY;  Surgeon: Juan F Sherman MD;  [facility]     NASAL/SINUS POLYPECTOMY       wisdom teeth removed         Allergies:  Allergies as of 2020     (No Known Allergies)       Current Medications:  Current Outpatient Medications   Medication Sig Dispense Refill     albuterol (PROAIR HFA/PROVENTIL HFA/VENTOLIN HFA) 108 (90 Base) MCG/ACT inhaler Inhale 2 puffs into the lungs every 4 hours as needed for shortness of breath / dyspnea, wheezing or other (cough) 1 Inhaler 0     Multiple Vitamins-Minerals (MULTIVITAMIN ADULT PO) Take 1 tablet by mouth       sertraline (ZOLOFT) 25 MG tablet           Family History:  Family History   Problem Relation Age of Onset     Esophageal Cancer Maternal Grandfather          in 60s; hx of smoking     Bladder Cancer Paternal Grandfather          in 80s     Breast Cancer Sister 39        invasive ductal carcinoma, treated with lumpectomy, radiation and tamoxifen     Breast Cancer Paternal Aunt         paternal great aunt in 70s     Breast Cancer Sister         Age 39 at diagnosis       Social History:  Social History     Socioeconomic History     Marital status:      Spouse name: Samantha     Number of children: 2     Years of education: Not on file     Highest education level: Not on file   Occupational History     Comment: works for [employer]   Social Needs     Financial resource strain: Not on file     Food insecurity:     Worry: Not on file     Inability: Not on file     Transportation needs:     Medical: Not on file     Non-medical: Not on file   Tobacco Use     Smoking status: Never Smoker     Smokeless tobacco: Never Used   Substance and Sexual Activity     Alcohol use: Yes     Comment: 1-2 per week.     Drug use: No     Sexual activity: Yes     Partners: Female     Birth  control/protection: None   Lifestyle     Physical activity:     Days per week: Not on file     Minutes per session: Not on file     Stress: Not on file   Relationships     Social connections:     Talks on phone: Not on file     Gets together: Not on file     Attends Anabaptist service: Not on file     Active member of club or organization: Not on file     Attends meetings of clubs or organizations: Not on file     Relationship status: Not on file     Intimate partner violence:     Fear of current or ex partner: Not on file     Emotionally abused: Not on file     Physically abused: Not on file     Forced sexual activity: Not on file   Other Topics Concern      Service No     Blood Transfusions No     Caffeine Concern No     Occupational Exposure No     Hobby Hazards No     Sleep Concern No     Stress Concern No     Weight Concern No     Special Diet No     Back Care No     Exercise Yes     Comment: walks dog 2-3 times/day; prenatal exercises     Bike Helmet Not Asked     Seat Belt Yes     Self-Exams Yes     Parent/sibling w/ CABG, MI or angioplasty before 65F 55M? No   Social History Narrative     Not on file       Physical Exam:  /81   Pulse 83   Temp 98.6  F (37  C) (Oral)   Resp 16   Wt 129.7 kg (286 lb)   LMP 01/08/2020 (Approximate)   SpO2 96%   Breastfeeding Yes   BMI 44.79 kg/m       GENERAL APPEARANCE: healthy, alert and no distress     NECK: no adenopathy, no asymmetry or masses     LYMPHATICS: No cervical, supraclavicular, axillary or inguinal lymphadenopathy     RESP: lungs clear to auscultation - no rales, rhonchi or wheezes     CARDIOVASCULAR: regular rate and rhythm, normal S1 S2, no S3 or S4 and no murmur.   BREAST: A multi positional, bilateral breast exam was performed.  Fairly symmetrical pendulous breasts. Right breast: no palpable dominant masses, no nipple discharge, no skin changes. Soft tissue with minor areas of fibrocystic changes at 11 o'clock.  Right axilla: no palpable  adenopathy. Left breast: no palpable dominant masses, no nipple discharge, no skin changes. Left axilla: no palpable adenopathy. Soft tissue.    SKIN: no suspicious lesions or rashes on anterior torso, upper extremities or face.    Laboratory/Imaging Studies  No results found for any visits on 02/07/20.    ASSESSMENT  We discussed her last screening tests and reviewed results.  She has been doing well and has no breasts complaints. She is still breastfeeding in the morning and evening but not pumping milk; she states she has had no issues other than a clogged milk duct during this lactation period. She is busy managing care with her toddler and preschooler and ready to resume breast screening. Her exam today is unremarkable. She will proceed with the following:    INDIVIDUALIZED CANCER RISK MANAGEMENT PLAN:  Individualized Surveillance Plan for women  With 20% or greater lifetime risk of breast cancer   Per NCCN Breast Cancer Screening and Diagnosis Guidelines Version 2.2018    Recommended screening Test or procedure Last done Next Scheduled    Clinical encounter Ongoing risk assessment, risk reduction counseling and clinical breast exam, every 6-12 months. Refer to genetic counseling if not already done.   2/7/2020 Feb. 2021   However, some family histories with breast cancers at a very young age, may warrant screening starting earlier.    *May begin at age 40 if breast cancers in the family occur at later ages.    Annual mammogram beginning 10 years younger than the earliest breast cancer in the family but not prior to age 30.    Recommend annual breast MRI to begin 10 years younger than the earliest breast cancer in the family but not prior to age 25.    Breast MRIs are preferably done on day 7-15 of the menstrual cycle in premenopausal women. 11/17/2017 - Screening mammogram, BiRads1    8/8/2019 - Breast MRI, BiRads1    Mammogram after visit today Breast MRI in August (no sooner than 8/9)    Next exam: Feb.  2021 followed by tomosynthesis mammogram   Breast screening for patients at high risk due to thoracic radiation before 30 years old    Begin 10 years post radiation treatment or age 25.   Annual mammogram beginning 10 years after radiation but not prior to age 30    Annual breast MRI, preferably on day 7-15 of menstrual cycle for premenopausal women.       NA     NA   Women who have a lifetime risk of >20% based on history of LCIS or ADH/ALH Annual screening mammogram beginning at age of LCIS or ADH/ALH but not prior to age 30.    Consider annual MRI to begin at age of diagnosis of LCIS or ADH/ALH but not prior to age 25.    Consider risk reducing strategies.     NA     NA    Recommend risk reducing strategies for women with 1.7% 5 year risk of breast cancer.       I spent 25 minutes with the patient with greater that 50% of it in counseling and coordinating care as documented above.    MIKEL Richey-CNS, OCN, AGN-BC  Clinical Nurse Specialist  Cancer Risk Management Program  [facility] [address]  [place] [address]    phone:  [phone]  Pager: [phone]  fax: [phone]    CC: UBALDO LopezNP

## 2020-02-10 ENCOUNTER — PRE VISIT (OUTPATIENT)
Dept: PULMONOLOGY | Facility: CLINIC | Age: 40
End: 2020-02-10

## 2020-02-24 ENCOUNTER — OFFICE VISIT (OUTPATIENT)
Dept: URGENT CARE | Facility: URGENT CARE | Age: 40
End: 2020-02-24
Payer: COMMERCIAL

## 2020-02-24 VITALS
DIASTOLIC BLOOD PRESSURE: 80 MMHG | RESPIRATION RATE: 16 BRPM | TEMPERATURE: 99.1 F | SYSTOLIC BLOOD PRESSURE: 120 MMHG | HEART RATE: 94 BPM | OXYGEN SATURATION: 96 % | BODY MASS INDEX: 44.79 KG/M2 | WEIGHT: 286 LBS

## 2020-02-24 DIAGNOSIS — J11.1 INFLUENZA: ICD-10-CM

## 2020-02-24 DIAGNOSIS — R50.9 FEVER WITH CHILLS: Primary | ICD-10-CM

## 2020-02-24 LAB
FLUAV+FLUBV AG SPEC QL: NEGATIVE
FLUAV+FLUBV AG SPEC QL: NEGATIVE
SPECIMEN SOURCE: NORMAL

## 2020-02-24 PROCEDURE — 87804 INFLUENZA ASSAY W/OPTIC: CPT | Performed by: FAMILY MEDICINE

## 2020-02-24 PROCEDURE — 99214 OFFICE O/P EST MOD 30 MIN: CPT | Performed by: FAMILY MEDICINE

## 2020-02-24 RX ORDER — OSELTAMIVIR PHOSPHATE 75 MG/1
75 CAPSULE ORAL 2 TIMES DAILY
Qty: 10 CAPSULE | Refills: 0 | Status: SHIPPED | OUTPATIENT
Start: 2020-02-24 | End: 2020-02-29

## 2020-02-24 NOTE — PROGRESS NOTES
SUBJECTIVE: Karolina Herrera is a 40 year old female presenting with a chief complaint of fever, nasal congestion and cough .  Onset of symptoms was 1 day(s) ago.  Course of illness is worsening.    Severity moderate  Current and Associated symptoms: stuffy nose and cough - non-productive  Treatment measures tried include Tylenol/Ibuprofen.  Predisposing factors include exposure to influenza.    Past Medical History:   Diagnosis Date     Abnormal vaginal bleeding 2008 and 2009     At high risk for breast cancer      Encounter for gynecological examination without abnormal finding 10/19/2015    Overview:  Wife is Peyton.  Both are . Pap neg 6/10/ 2013: record in Care Everywhere from [facility]      Family history of malignant neoplasm of breast      High risk pregnancy, antepartum 3/30/2018    Overview:  Prenatal provider & planned delivery site: [facility] Wife is Peyton.   IVF through [facility] with donor from sperm bank.  Same donor as first pregnancy with their son Huy.  Labs and records requested from [facility].   Initial prenatal bloodwork ___  GC/CT ___- These labs were deferred to wait for records from [facility] [facility].   Pregravid BMI >40.  Early GCT ____ UC done.  Pap up to date 7/17 Geneti     Low back pain 6/18/2014     Morbid obesity due to excess calories (H) 3/25/2016     Obesity with body mass index 30 or greater 12/31/2014     No Known Allergies  Social History     Tobacco Use     Smoking status: Never Smoker     Smokeless tobacco: Never Used   Substance Use Topics     Alcohol use: Yes     Comment: 1-2 per week.       ROS:  SKIN: no rash  GI: no vomiting    OBJECTIVE:  /80   Pulse 94   Temp 99.1  F (37.3  C) (Oral)   Resp 16   Wt 129.7 kg (286 lb)   SpO2 96%   BMI 44.79 kg/m  GENERAL APPEARANCE: healthy, alert and no distress  EYES: EOMI,  PERRL, conjunctiva clear  HENT: ear canals and TM's normal.  Nose and mouth without ulcers, erythema or lesions  NECK: supple, nontender, no lymphadenopathy  RESP:  lungs clear to auscultation - no rales, rhonchi or wheezes  SKIN: no suspicious lesions or rashes      ICD-10-CM    1. Fever with chills R50.9 Influenza A/B antigen   2. Influenza J11.1 oseltamivir (TAMIFLU) 75 MG capsule       Fluids/Rest, f/u if worse/not any better

## 2020-02-24 NOTE — PATIENT INSTRUCTIONS
Patient Education     Influenza (Adult)    Influenza is also called the flu. It is a viral illness that affects the air passages of your lungs. It is different from the common cold. The flu can easily be passed from one to person to another. It may be spread through the air by coughing and sneezing. Or it can be spread by touching the sick person and then touching your own eyes, nose, or mouth.  The flu starts 1 to 3 days after you are exposed to the flu virus. It may last for 1 to 2 weeks but many people feel tired or fatigued for many weeks afterward. You usually don t need to take antibiotics unless you have a complication. This might be an ear or sinus infection or pneumonia.  Symptoms of the flu may be mild or severe. They can include extreme tiredness (wanting to stay in bed all day), chills, fevers, muscle aches, soreness with eye movement, headache, and a dry, hacking cough.  Home care  Follow these guidelines when caring for yourself at home:    Avoid being around cigarette smoke, whether yours or other people s.    Acetaminophen or ibuprofen will help ease your fever, muscle aches, and headache. Don t give aspirin to anyone younger than 18 who has the flu. Aspirin can harm the liver.    Nausea and loss of appetite are common with the flu. Eat light meals. Drink 6 to 8 glasses of liquids every day. Good choices are water, sport drinks, soft drinks without caffeine, juices, tea, and soup. Extra fluids will also help loosen secretions in your nose and lungs.    Over-the-counter cold medicines will not make the flu go away faster. But the medicines may help with coughing, sore throat, and congestion in your nose and sinuses. Don t use a decongestant if you have high blood pressure.    Stay home until your fever has been gone for at least 24 hours without using medicine to reduce fever.  Follow-up care  Follow up with your healthcare provider, or as advised, if you are not getting better over the next  week.  If you are age 65 or older, talk with your provider about getting a pneumococcal vaccine every 5 years. You should also get this vaccine if you have chronic asthma or COPD. All adults should get a flu vaccine every fall. Ask your provider about this.  When to seek medical advice  Call your healthcare provider right away if any of these occur:    Cough with lots of colored mucus (sputum) or blood in your mucus    Chest pain, shortness of breath, wheezing, or trouble breathing    Severe headache, or face, neck, or ear pain    New rash with fever    Fever of 100.4 F (38 C) or higher, or as directed by your healthcare provider    Confusion, behavior change, or seizure    Severe weakness or dizziness    You get a new fever or cough after getting better for a few days  Date Last Reviewed: 1/1/2017 2000-2019 [employer]. [address] [place] [address], [place] [address]. All rights reserved. This information is not intended as a substitute for professional medical care. Always follow your healthcare professional's instructions.

## 2020-06-19 ENCOUNTER — VIRTUAL VISIT (OUTPATIENT)
Dept: FAMILY MEDICINE | Facility: OTHER | Age: 40
End: 2020-06-19

## 2020-06-19 NOTE — PROGRESS NOTES
"Date: 2020 08:22:29  Clinician: Felisha Renae  Clinician NPI: [id number]  Patient: Karolina Herrera  Patient : 1980  Patient Address: [address] [place], [place] [address]  Patient Phone: [phone]  Visit Protocol: URI  Patient Summary:  Karolina is a 40 year old ( : 1980 ) female who initiated a Visit for COVID-19 (Coronavirus) evaluation and screening. When asked the question \"Please sign me up to receive news, health information and promotions from [email or web address].\", Karolina responded \"No\".    Karolina states her symptoms started 1-2 days ago.   Her symptoms consist of a sore throat, facial pain or pressure, and a headache.   Symptom details     Sore throat: Karolina reports having mild throat pain (1-3 on a 10 point pain scale), does not have exudate on her tonsils, and can swallow liquids. She is not sure if the lymph nodes in her neck are enlarged. A rash has not appeared on the skin since the sore throat started.     Facial pain or pressure: The facial pain or pressure feels worse when bending over or leaning forward.     Headache: She states the headache is moderate (4-6 on a 10 point pain scale).      Karolina denies having wheezing, nausea, teeth pain, ageusia, diarrhea, malaise, myalgias, anosmia, fever, cough, nasal congestion, vomiting, rhinitis, ear pain, and chills. She also denies having recent facial or sinus surgery in the past 60 days and taking antibiotic medication for the symptoms. She is not experiencing dyspnea.   Precipitating events  Within the past week, Karolina has not been exposed to someone with strep throat.   Pertinent COVID-19 (Coronavirus) information  In the past 14 days, Karolina has not worked in a congregate living setting.   She does not work or volunteer as healthcare worker or a  and does not work or volunteer in a healthcare facility.   Karolina also has not lived in a congregate living setting in the past 14 days. She does not live with a " healthcare worker.   Karolina has not had a close contact with a laboratory-confirmed COVID-19 patient within 14 days of symptom onset.   Pertinent medical history  Karolina does not get yeast infections when she takes antibiotics.   Karolina does not need a return to work/school note.   Weight: 280 lbs   Karolina does not smoke or use smokeless tobacco.   She denies pregnancy and is breastfeeding. She has menstruated in the past month.   Weight: 280 lbs    MEDICATIONS: No current medications, ALLERGIES: NKDA  Clinician Response:  Dear Karolina,  Based on the information you have provided, you have Allergic Rhinitis commonly called hay fever or seasonal allergy.  Medication information  I am prescribing:     Fluticasone 50 mcg/actuation nasal spray. Inhale 2 sprays in each nostril 1 time per day; after 1 week, may adjust to 1 - 2 sprays in each nostril 1 time per day. This medication takes several days to start working, so keep taking it even if it doesn't help right away. There are no refills with this prescription.   Unless you are allergic to the over-the-counter medication(s) below, I recommend using:   An antihistamine such as Allegra, Claritin, Zyrtec, or store brand. Please follow the instructions on the package.   Over-the-counter medications do not require a prescription. Ask the pharmacist if you have any questions.  Self care  Steps you can take to be as comfortable as possible:     Use throat lozenges.    Suck on frozen items such as popsicles.    Drink hot tea with lemon and honey.    Gargle with warm salt water (1/4 teaspoon of salt per 8 ounce glass of water).    Avoid substances you are allergic to.    Try to reduce the amount of humidity in your living and working environments.    Consider moving pets outside of your living and/or working area.    You can decrease your allergy symptoms by staying indoors as much as possible until your allergy season is over.     When to seek care  Please be seen in a  clinic or urgent care if any of the following occur:   New symptoms develop, or symptoms become worse   Call ahead before going to the clinic or urgent care.  Call 911 or go to the emergency room if you feel that your throat is closing off, you suddenly develop a rash, you are unable to swallow fluids, you are drooling, or you are having difficulty breathing.  COVID-19 (Coronavirus) General Information  Because there is currently no vaccine to prevent infection, the best way to protect yourself is to avoid being exposed to this virus. Common symptoms of COVID-19 include but are not limited to fever, cough, and shortness of breath. These symptoms appear 2-14 days after you are exposed to the virus that causes COVID-19. Click here for more information from the CDC on how to protect yourself.  If you are sick with COVID-19 or suspect you are infected with the virus that causes COVID-19, follow the steps here from the CDC to help prevent the disease from spreading to people in your home and community.  Click here for general information from the CDC on testing.  If you develop any of these emergency warning signs for COVID-19, get medical attention immediately:     Trouble breathing    Persistent pain or pressure in the chest    New confusion or inability to arouse    Bluish lips or face      Call your doctor or clinic before going in. Call 911 if you have a medical emergency and notify the  you have or think you may have COVID-19.  For more detailed and up to date information on COVID-19 (Coronavirus), please visit the CDC website.   Diagnosis: Allergic Rhinitis  Diagnosis ICD: J30.9  Prescription: fluticasone 50 mcg/actuation nasal spray,suspension 1 120 spray aerosol with adapter (grams), 30 days supply. Inhale 2 sprays in each nostril 1 time per day; after 1 week, may adjust to 1 - 2 sprays in each nostril 1 time per day.. Refills: 0, Refill as needed: no, Allow substitutions: yes

## 2020-09-14 ENCOUNTER — VIRTUAL VISIT (OUTPATIENT)
Dept: FAMILY MEDICINE | Facility: CLINIC | Age: 40
End: 2020-09-14
Payer: COMMERCIAL

## 2020-09-14 DIAGNOSIS — N94.6 DYSMENORRHEA: Primary | ICD-10-CM

## 2020-09-14 PROCEDURE — 99214 OFFICE O/P EST MOD 30 MIN: CPT | Mod: GT | Performed by: NURSE PRACTITIONER

## 2020-09-14 NOTE — PROGRESS NOTES
"Karolina Herrera is a 40 year old female who is being evaluated via a billable video visit.      The patient has been notified of following:     \"This video visit will be conducted via a call between you and your physician/provider. We have found that certain health care needs can be provided without the need for an in-person physical exam.  This service lets us provide the care you need with a video conversation.  If a prescription is necessary we can send it directly to your pharmacy.  If lab work is needed we can place an order for that and you can then stop by our lab to have the test done at a later time.    Video visits are billed at different rates depending on your insurance coverage.  Please reach out to your insurance provider with any questions.    If during the course of the call the physician/provider feels a video visit is not appropriate, you will not be charged for this service.\"    Patient has given verbal consent for Video visit? Yes  How would you like to obtain your AVS? MyChart  If you are dropped from the video visit, the video invite should be resent to  Subjective     Karolina Herrera is a 40 year old female who presents today via video visit for the following health issues:    HPI    Has a breastfeeding toddler, 21 monthsl; who breastfeeds about three times daily. Karolina has gotten her period about 3x since she gave birth, and each has been more painful and heavier than the last. Her cramps are so bad that she has had to spend a few days in bed with a heating pad, which has never been the case with her periods previously. She thinks she has recently gained about 15 pounds; but no other skin or hair changes; diet changes; or supplements. During periods she feels that her \"uterus is so swollen\" or irritated that she has trouble having a bowel movement or urinating. She does not get constipated during her periods or have diarrhea. She feels that it is harder to insert menstrual cups than it " "has been in the past. No history of fibroids; has a history of one endometrial polyp that was removed before she underwent IVF. Mother underwent menopause in her 50s.     Video Start Time: 2:36 pm        Review of Systems   Constitutional, HEENT, cardiovascular, pulmonary, gi and gu systems are negative, except as otherwise noted.      Objective           Vitals:  No vitals were obtained today due to virtual visit.    Physical Exam     GENERAL: Healthy, alert and no distress  EYES: Eyes grossly normal to inspection.  No discharge or erythema, or obvious scleral/conjunctival abnormalities.  RESP: No audible wheeze, cough, or visible cyanosis.  No visible retractions or increased work of breathing.    SKIN: Visible skin clear. No significant rash, abnormal pigmentation or lesions.  NEURO: Cranial nerves grossly intact.  Mentation and speech appropriate for age.  PSYCH: Mentation appears normal, affect normal/bright, judgement and insight intact, normal speech and appearance well-groomed.      No results found for this or any previous visit (from the past 24 hour(s)).        Assessment & Plan   Problem List Items Addressed This Visit     None      Visit Diagnoses     Dysmenorrhea    -  Primary       -concern for a fibroid (due to heavier and more painful periods) or possible prolapse (due to sensation of uterus preventing her from having a bowel movement and urinating) recommended an in-person exam with OBGYN for a pelvic exam and possible ultrasound. No labs needed at this point      BMI:   Estimated body mass index is 44.79 kg/m  as calculated from the following:    Height as of 9/17/19: 1.702 m (5' 7\").    Weight as of 2/24/20: 129.7 kg (286 lb).             No follow-ups on file.    MIKEL Maurer [facility]      Video-Visit Details    Type of service:  Video Visit    Video End Time:2:58 PM    Originating Location (pt. Location): Home    Distant Location (provider " location):  [facility]IMARY [facility]     Platform used for Video Visit: Tamiko

## 2020-09-16 ENCOUNTER — IMMUNIZATION (OUTPATIENT)
Dept: NURSING | Facility: CLINIC | Age: 40
End: 2020-09-16
Payer: COMMERCIAL

## 2020-09-16 PROCEDURE — 90471 IMMUNIZATION ADMIN: CPT

## 2020-09-16 PROCEDURE — 90686 IIV4 VACC NO PRSV 0.5 ML IM: CPT

## 2020-10-08 ENCOUNTER — OFFICE VISIT (OUTPATIENT)
Dept: OBGYN | Facility: CLINIC | Age: 40
End: 2020-10-08
Payer: COMMERCIAL

## 2020-10-08 VITALS
TEMPERATURE: 98.6 F | BODY MASS INDEX: 46.36 KG/M2 | HEART RATE: 99 BPM | DIASTOLIC BLOOD PRESSURE: 80 MMHG | WEIGHT: 293 LBS | SYSTOLIC BLOOD PRESSURE: 122 MMHG

## 2020-10-08 DIAGNOSIS — R10.2 PELVIC PAIN IN FEMALE: Primary | ICD-10-CM

## 2020-10-08 DIAGNOSIS — B37.31 YEAST INFECTION OF THE VAGINA: ICD-10-CM

## 2020-10-08 LAB
ALBUMIN UR-MCNC: NEGATIVE MG/DL
APPEARANCE UR: CLEAR
BACTERIA #/AREA URNS HPF: ABNORMAL /HPF
BILIRUB UR QL STRIP: NEGATIVE
COLOR UR AUTO: YELLOW
GLUCOSE UR STRIP-MCNC: NEGATIVE MG/DL
HGB UR QL STRIP: NEGATIVE
KETONES UR STRIP-MCNC: NEGATIVE MG/DL
LEUKOCYTE ESTERASE UR QL STRIP: NEGATIVE
NITRATE UR QL: NEGATIVE
NON-SQ EPI CELLS #/AREA URNS LPF: ABNORMAL /LPF
PH UR STRIP: 5.5 PH (ref 5–7)
RBC #/AREA URNS AUTO: ABNORMAL /HPF
SOURCE: ABNORMAL
SP GR UR STRIP: >1.03 (ref 1–1.03)
SPECIMEN SOURCE: ABNORMAL
UROBILINOGEN UR STRIP-ACNC: 0.2 EU/DL (ref 0.2–1)
WBC #/AREA URNS AUTO: ABNORMAL /HPF
WET PREP SPEC: ABNORMAL

## 2020-10-08 PROCEDURE — 87491 CHLMYD TRACH DNA AMP PROBE: CPT | Performed by: OBSTETRICS & GYNECOLOGY

## 2020-10-08 PROCEDURE — 81001 URINALYSIS AUTO W/SCOPE: CPT | Performed by: OBSTETRICS & GYNECOLOGY

## 2020-10-08 PROCEDURE — 99203 OFFICE O/P NEW LOW 30 MIN: CPT | Performed by: OBSTETRICS & GYNECOLOGY

## 2020-10-08 PROCEDURE — 87210 SMEAR WET MOUNT SALINE/INK: CPT | Performed by: OBSTETRICS & GYNECOLOGY

## 2020-10-08 PROCEDURE — 87591 N.GONORRHOEAE DNA AMP PROB: CPT | Performed by: OBSTETRICS & GYNECOLOGY

## 2020-10-08 RX ORDER — FLUCONAZOLE 150 MG/1
150 TABLET ORAL ONCE
Qty: 1 TABLET | Refills: 0 | Status: SHIPPED | OUTPATIENT
Start: 2020-10-08 | End: 2020-12-01

## 2020-10-08 NOTE — PATIENT INSTRUCTIONS
Patient Education     Pelvic Pain, Uncertain Cause    Pelvic pain is pain felt in the lowest part of the belly (abdomen) and between the hipbones. The pain may occur suddenly and recently (acute). Or the pain may last for 6 months or longer (chronic).  There are many possible causes of pelvic pain. The pain may be due to a problem in the female reproductive system. Or, it may be due to a problem in the digestive, urinary, or musculoskeletal systems.  Based on your visit today, the exact cause of your pelvic pain is not certain. Your condition does not appear to be serious at this time. But it is important for you to keep watching for any new symptoms or worsening of your condition.  General care  Your healthcare provider may advise a number of ways to help manage your pain. These can include:    Taking over-the-counter pain medicine. Stronger pain medicine may also be prescribed, if needed.    Applying heat to the pelvic area. Use a heating pad or a hot pack. Taking a hot bath may also help.    Getting plenty of rest.    Making certain lifestyle changes. These can include practicing good posture and getting regular exercise. Studies have shown that these changes help reduce pelvic pain in some women.    Seeing a physical therapist or pain specialist. These healthcare providers can discuss other ways to manage pain with you.  Follow-up care  Follow up with your healthcare provider, or as advised.   When to seek medical advice  Call your healthcare provider right away if any of the following occur:    Fever of 100.4 F or higher, or as directed by your healthcare provider    Pain worsens or you have sudden, severe pain or new pain    Nausea, vomiting, sweating, or restlessness    Dizziness or fainting    Unusual vaginal discharge    Abnormal vaginal bleeding (especially bleeding after menopause)  Date Last Reviewed: 10/1/2017    [id number] [employer]. [address] [place] [address], [place] [address]. All  rights reserved. This information is not intended as a substitute for professional medical care. Always follow your healthcare professional's instructions.           Patient Education     Transvaginal Ultrasound (Endovaginal Ultrasound)  A transvaginal ultrasound is an imaging test. An ultrasound uses sound waves to form pictures of your organs that can be seen on a screen. It is often done with a probe placed on the belly. A transvaginal ultrasound uses a special probe that is put into your vagina. It uses sound waves to make pictures of your uterus, ovaries, and other pelvic organs. This test can be used to check symptoms such as pain. It can also check for problems. In pregnant women, it is used to check the unborn baby or fetus. The test is often done by a specially trained technologist called a sonographer.  Getting ready for your test    You may be asked to go to the bathroom. Your bladder may need to be empty before the test.    Tell the sonographer what medicines you take. Let him or her know if you have had pelvic surgery.    Answer any other questions the sonographer asks. Your answers will help him or her adapt the test to your health needs.    During your test    You may change into a hospital gown. You'll then lie down on an exam table with your knees raised, as you would for a pelvic exam.    The sonographer will use a thin hand-held probe. It is shaped like a tampon. The probe has a sterile cover and nongreasy gel. It is put inside your vagina. In some cases, you may be asked to put the probe in yourself, as you would a tampon.    The sonographer moves the probe to get the best picture. You may feel pressure. Tell the sonographer if you feel pain.    After your test  Before leaving, you may need to wait for a short time while the images are reviewed. You can go back to your normal routine right after the test. Your healthcare provider will let you know when the results of your test are ready.  Be  aware that although the sonographer can answer questions about the test, only your healthcare provider can explain the results.  Date Last Reviewed: 11/1/2017 2000-2019 [employer]. [address] [place] [address], [place] [address]. All rights reserved. This information is not intended as a substitute for professional medical care. Always follow your healthcare professional's instructions.

## 2020-10-08 NOTE — PROGRESS NOTES
[facility]- OBGYN    CC:painful menses    S:Karolina Herrera is a 40 year old  who presents today for painful menses.  Patient reports that she is breast feeding her 22 month old.  She has had 5-6 periods since her delivery.  At first they were irregular, but now they are becoming more regular.  She has noted her last few menses have been more painful than prior to her pregnancy.  She states that her period 6 weeks ago was extremely painful.  She describes the pain as cramping and intermittently sharp.  The pain was so severe that she couldn't get out of bed.  She also had heavy bleeding with that period.  She tried ibuprofen and it did not seem to help.  She has never had painful periods like this before.  Then her last period 2 weeks ago was painful, but not as severe as 6 weeks ago.  She did take schedule ibuprofen with that period.  She does think she may have a yeast infection right now.  She feels some increased vaginal discharge.  She also notes a history of BV, but that typically presents with sore edematous labia, which she is not experiencing at this time.  She states she has IBS with intermittent diarrhea, but no issues with constipation.  She denies any pain or burning with urination.  She denies fever, chills, chest pain, chest pressure, shortness of breath, nausea, or vomiting.      OBGYN Hx:      x2  LMP- 17 NIL HPV negative  Menses: see above  Sexually active with female partner  Patient has used OCPs in remote past for period regulation.  Last in  and this was due to long periods  STD Hx:none  Pap hx:17 NIL HPV negative, next due     PMH: obesity, PCOS  PSH: wisdom tooth removal, laparoscopic cholecystectomy, polypectomy (endometrial).  Patient denies any history of migraine with aura, HTN, or VTE  Meds: zoloft, albuterol  Allergies: NKDA  SH: Denies any tobacco or drug use.  Consumes 3-4 drinks per day  FH: Father with PE recently at age 75, he  also has A-fib.  Patient's sister with breast cancer age 39.  Sister had genetic testing that was negative for BRCA mutation    O:   Patient Vitals for the past 24 hrs:   BP Temp Temp src Pulse Weight   10/08/20 1145 122/80 98.6  F (37  C) Oral 99 134.3 kg (296 lb)   ]    Exam:  General- awake, alert, answering questions appropriately, appears comfortable  CV- regular rate  Lung- breathing comfortably on room air  Abd- soft, non-tender, non-distended, obese  - normal appearing external genitalia, normal appearing vaginal mucosa, normal appearing cervix, moderate amount of clear vaginal/white vaginal discharge.  On bimanual exam- palpation of uterine size and adnexa limited by body habitus.  No cervical motion tenderness, no adnexal tenderness bilaterally    A&P: Karolina Herrera is a 40 year old  who presents today for painful menses.    (R10.2) Pelvic pain in female  (primary encounter diagnosis)  Comment: Patient with painful periods.  Reviewed potential contributors to pain including infection.  Will rule out UTI with UA, UC, and evaluate for vaginal infection with wet prep and GC/CT.  Discussed potential structural causes of heavy painful menses with ultrasound.  Structural causes can include myoma, polyps, or ovarian cysts.  Discussed that if all testing comes back negative/ normal, painful menses can be managed with ovulatory suppression using birth control such as OCP or depo provera.  Also briefly discussed IUD to regulate heavy menses, but if pain is due to ovulatory cause, Mirena is not a reliable source of ovulatory suppression.  Plan for follow up visit for treatment planning after ultrasound and lab results completed.  Plan: US Transvaginal Non OB, UA with Microscopic         reflex to Culture, Wet prep, NEISSERIA         GONORRHOEA PCR, CHLAMYDIA TRACHOMATIS PCR    (B37.3) Yeast infection of the vagina  Comment: wet prep positive for yeast.   Plan: fluconazole (DIFLUCAN) 150 MG  tablet    Lisa Maria MD

## 2020-10-08 NOTE — NURSING NOTE
"Chief Complaint   Patient presents with     Consult       Initial /80   Pulse 99   Temp 98.6  F (37  C) (Oral)   Wt 134.3 kg (296 lb)   BMI 46.36 kg/m   Estimated body mass index is 46.36 kg/m  as calculated from the following:    Height as of 19: 1.702 m (5' 7\").    Weight as of this encounter: 134.3 kg (296 lb).  BP completed using cuff size: regular    Questioned patient about current smoking habits.  Pt. has never smoked.          The following HM Due: NONE      The following patient reported/Care Every where data was sent to:  P ABSTRACT QUALITY INITIATIVES [[id number]]        N/a      Jeanie Quick MA                "

## 2020-10-09 LAB
C TRACH DNA SPEC QL NAA+PROBE: NEGATIVE
N GONORRHOEA DNA SPEC QL NAA+PROBE: NEGATIVE
SPECIMEN SOURCE: NORMAL
SPECIMEN SOURCE: NORMAL

## 2020-11-03 ENCOUNTER — ANCILLARY PROCEDURE (OUTPATIENT)
Dept: ULTRASOUND IMAGING | Facility: CLINIC | Age: 40
End: 2020-11-03
Attending: OBSTETRICS & GYNECOLOGY
Payer: COMMERCIAL

## 2020-11-03 DIAGNOSIS — R10.2 PELVIC PAIN IN FEMALE: ICD-10-CM

## 2020-11-03 PROCEDURE — 76830 TRANSVAGINAL US NON-OB: CPT | Performed by: OBSTETRICS & GYNECOLOGY

## 2020-11-06 ENCOUNTER — TELEPHONE (OUTPATIENT)
Dept: OBGYN | Facility: CLINIC | Age: 40
End: 2020-11-06

## 2020-11-06 NOTE — TELEPHONE ENCOUNTER
I tried to call her and she did not answer.  I left another message.  Let me know if there is a specific time she can talk.  Thanks,  Lisa

## 2020-11-06 NOTE — TELEPHONE ENCOUNTER
Patient returned your call regarding her ultrasound results from 11/3/2020. Are you able to call her back to discuss? Alpa Rock RN

## 2020-11-10 ENCOUNTER — OFFICE VISIT (OUTPATIENT)
Dept: OBGYN | Facility: CLINIC | Age: 40
End: 2020-11-10
Attending: OBSTETRICS & GYNECOLOGY
Payer: COMMERCIAL

## 2020-11-10 VITALS
SYSTOLIC BLOOD PRESSURE: 113 MMHG | TEMPERATURE: 97.9 F | BODY MASS INDEX: 46.99 KG/M2 | DIASTOLIC BLOOD PRESSURE: 80 MMHG | HEART RATE: 83 BPM | WEIGHT: 293 LBS

## 2020-11-10 DIAGNOSIS — R10.2 PELVIC PAIN IN FEMALE: Primary | ICD-10-CM

## 2020-11-10 LAB
CREAT SERPL-MCNC: 0.66 MG/DL (ref 0.52–1.04)
GFR SERPL CREATININE-BSD FRML MDRD: >90 ML/MIN/{1.73_M2}

## 2020-11-10 PROCEDURE — 82565 ASSAY OF CREATININE: CPT | Performed by: OBSTETRICS & GYNECOLOGY

## 2020-11-10 PROCEDURE — 36415 COLL VENOUS BLD VENIPUNCTURE: CPT | Performed by: OBSTETRICS & GYNECOLOGY

## 2020-11-10 PROCEDURE — 99213 OFFICE O/P EST LOW 20 MIN: CPT | Performed by: OBSTETRICS & GYNECOLOGY

## 2020-11-10 NOTE — PROGRESS NOTES
[facility]- OBGYN    CC: pelvic ultrasound follow up     S:Karolina Herrera is a 40 year old  who presents today for pelvic pain and ultrasound follow up.  Patient reports that since  her pain with menses has not been nearly as significant.  She does not have pain or bleeding between periods.  Her LMP is Oct 26th and she felt minimal pain with that period.      Work up has included UA and gc/ct negative 10/8.  Wet prep pos for yeast, which is now s/p treatment.  Ultrasound completed 11/3, see below.    O:   Patient Vitals for the past 24 hrs:   BP Temp Temp src Pulse Weight   11/10/20 0803 113/80 97.9  F (36.6  C) Temporal 83 136.1 kg (300 lb)   ]  Exam:  General- awake, alert, answering questions appropriately, appears comfortable  CV- regular rate  Lung- breathing comfortably on room air    Imaging and Labs:  Lia Reyes on 11/3/2020  7:25 AM       Gynecological Ultrasound Report  Pelvic U/S - Transvaginal   [facility]  Referring Provider: Lisa Maria MD   Sonographer:  Lia Reyes RDMS  Indication: pelvic pain  LMP: No LMP recorded. (Menstrual status: Breast Feeding).  Gynecological Ultrasonography:   Uterus: retroverted. Contour is smooth/regular., Mass posterior to uterus = 6.2x 5.6x 4.8cm  Size: 6.7 x 5.1 x 3.6 cm  Endometrium: Thickness Total 9.6 mm  Right Ovary: 4.1 x 2.8 x 2.7 cm. Enlarged and Multiple small cysts on periphery  Left Ovary: 3.2 x 1.9 x 1.8 cm. Multiple small cysts on periphery  Cul de Sac Free Fluid: No free fluid     Impression:      The uterus and ovaries were visualized and no abnormalities were seen.  Both ovaries have mulitple small follicles around the periphery, consistent with the appearance of polycystic ovaries.     Posterior to the uterus at the level of the cervix appears to have multi linear mass of unknown etiology. Recommend xray and possible CT or MRI to evaluate pouch of  avery.     LAURA PHELAN MD            A&P: Karolina Herrera is a 40 year old  who presents today for pelvic pain and ultrasound follow up for painful menses.    (R10.2) Pelvic pain in female  (primary encounter diagnosis)  Comment: Reviewed ultrasound report and images with the patient.  Explained that her uterus appears normal and bilateral ovaries have findings consistent with PCOS.  She states that she has had diagnosis of PCOS in the past.  Reviewed ultrasound findings of mass of unknown etiology posterior to the uterus.  Discussed that given characteristics and location would recommend further imaging with CT abdomen/pelvis.  Discussed endometriosis characteristically presents as pain and cramping with menses, but would be unusual to only feel with a couple of periods and then improve significantly.  Will plan CT abdomen/pelvis with contrast and call patient to discuss results, then make plans for further follow up.  Plan: CT Abdomen Pelvis w Contrast, Creatinine    Lisa Maria MD

## 2020-11-10 NOTE — NURSING NOTE
"Chief Complaint   Patient presents with     Consult       Initial /80   Pulse 83   Temp 97.9  F (36.6  C) (Temporal)   Wt 136.1 kg (300 lb)   BMI 46.99 kg/m   Estimated body mass index is 46.99 kg/m  as calculated from the following:    Height as of 19: 1.702 m (5' 7\").    Weight as of this encounter: 136.1 kg (300 lb).  BP completed using cuff size: regular    Questioned patient about current smoking habits.  Pt. has never smoked.          The following HM Due: NONE      The following patient reported/Care Every where data was sent to:  P ABSTRACT QUALITY INITIATIVES [[id number]]        N/a      Jeanie Quick MA                "

## 2020-11-10 NOTE — PATIENT INSTRUCTIONS
"  Patient Education     CT Scan     During the test, relax and remain as still as you can.   CT scan is a test that combines X-rays and computer scans. The result is a detailed picture that can show problems with soft tissues (such as the lining of your sinuses), organs (such as your kidneys or lungs), blood vessels, and bones.  Tell the technologist   Be sure to tell the technologist if you:    Have allergies or kidney problems    Take diabetes medicine    Are pregnant or think you may be    Ate or drank anything before the test  Before your test    Be sure to tell your healthcare provider if you have ever had a reaction to contrast material (\"X-ray dye\"). If you have had a reaction, you may need to take medicine before your scan, so be sure to tell your provider ahead of time.     Be sure to mention the medicines you take. Ask if it's OK to take them before the test.     Follow any directions you re given for not eating or drinking before the procedure. Your provider will give you instructions if required. You may be required to drink contrast by mouth before arriving for the study depending on the type of exam you are having. Your provider or the imaging site will provide this for you.    The length of the procedure may vary, depending on your condition and your provider's practices.    Arrive on time to check in.    When you arrive, you may be asked to change into a hospital gown. Remove all metal near the part of your body that will be scanned, including jewelry, eyeglasses, and dentures. Women may need to remove any bra that has metal underwire.     During your test    You may be given contrast through an IV (intravenous) line or by mouth.    You will lie on a table. The table slides into the CT scanner.    The technologist will ask you to hold your breath for a few seconds during your scan.    After your test    You can go back to your normal diet and activities right away. Any contrast will pass naturally " through your body within a day.    Before leaving, you may need to wait briefly while your images are being reviewed. Your healthcare provider will discuss the test results with you during a follow-up appointment or over the phone.    Your next appointment is:__________________    Ish last reviewed this educational content on 6/1/2019 2000-2020 [employer]. [address] [place] [address], [place] [address]. All rights reserved. This information is not intended as a substitute for professional medical care. Always follow your healthcare professional's instructions.

## 2020-11-12 ENCOUNTER — ANCILLARY PROCEDURE (OUTPATIENT)
Dept: CT IMAGING | Facility: CLINIC | Age: 40
End: 2020-11-12
Attending: OBSTETRICS & GYNECOLOGY
Payer: COMMERCIAL

## 2020-11-12 DIAGNOSIS — R10.2 PELVIC PAIN IN FEMALE: ICD-10-CM

## 2020-11-12 PROCEDURE — 74177 CT ABD & PELVIS W/CONTRAST: CPT | Mod: GC | Performed by: RADIOLOGY

## 2020-11-12 RX ORDER — IOPAMIDOL 755 MG/ML
135 INJECTION, SOLUTION INTRAVASCULAR ONCE
Status: COMPLETED | OUTPATIENT
Start: 2020-11-12 | End: 2020-11-12

## 2020-11-12 RX ADMIN — IOPAMIDOL 135 ML: 755 INJECTION, SOLUTION INTRAVASCULAR at 08:29

## 2020-12-01 ENCOUNTER — MYC MEDICAL ADVICE (OUTPATIENT)
Dept: OBGYN | Facility: CLINIC | Age: 40
End: 2020-12-01

## 2020-12-01 DIAGNOSIS — B37.31 YEAST INFECTION OF THE VAGINA: ICD-10-CM

## 2020-12-01 RX ORDER — FLUCONAZOLE 150 MG/1
150 TABLET ORAL ONCE
Qty: 1 TABLET | Refills: 0 | Status: SHIPPED | OUTPATIENT
Start: 2020-12-01 | End: 2020-12-01

## 2020-12-01 NOTE — TELEPHONE ENCOUNTER
Pt requesting refill of diflucan.  Has discharge, a smell, and itching.  Last dosed 10/2020.  Refill sent per RN refill protocol.  Delma Singletary RN

## 2020-12-06 ENCOUNTER — HEALTH MAINTENANCE LETTER (OUTPATIENT)
Age: 40
End: 2020-12-06

## 2021-01-20 ENCOUNTER — MYC MEDICAL ADVICE (OUTPATIENT)
Dept: FAMILY MEDICINE | Facility: CLINIC | Age: 41
End: 2021-01-20

## 2021-01-20 DIAGNOSIS — H53.9 VISION CHANGES: Primary | ICD-10-CM

## 2021-01-20 DIAGNOSIS — Z85.828 ENCOUNTER FOR FOLLOW-UP SURVEILLANCE OF SKIN CANCER: ICD-10-CM

## 2021-01-20 DIAGNOSIS — Z08 ENCOUNTER FOR FOLLOW-UP SURVEILLANCE OF SKIN CANCER: ICD-10-CM

## 2021-01-21 NOTE — TELEPHONE ENCOUNTER
Georgie    See pt MyChart message    Referrals cued    Estela Rdz, RN   [facility]

## 2021-01-22 NOTE — TELEPHONE ENCOUNTER
FUTURE VISIT INFORMATION      FUTURE VISIT INFORMATION:    Date: 1.27.21    Time: 10:20 PM    Location:  eye  REFERRAL INFORMATION:    Referring provider:  Kim    Referring providers clinic:  [facility]     Reason for visit/diagnosis  Vision changes    RECORDS REQUESTED FROM:       Clinic name Comments Records Status Imaging Status   [facility] 1.20.21 [facility]

## 2021-01-27 ENCOUNTER — OFFICE VISIT (OUTPATIENT)
Dept: OPHTHALMOLOGY | Facility: CLINIC | Age: 41
End: 2021-01-27
Attending: NURSE PRACTITIONER
Payer: COMMERCIAL

## 2021-01-27 ENCOUNTER — PRE VISIT (OUTPATIENT)
Dept: OPHTHALMOLOGY | Facility: CLINIC | Age: 41
End: 2021-01-27

## 2021-01-27 DIAGNOSIS — H52.4 PRESBYOPIA: Primary | ICD-10-CM

## 2021-01-27 PROCEDURE — 92004 COMPRE OPH EXAM NEW PT 1/>: CPT | Performed by: OPTOMETRIST

## 2021-01-27 PROCEDURE — 92015 DETERMINE REFRACTIVE STATE: CPT | Performed by: OPTOMETRIST

## 2021-01-27 ASSESSMENT — REFRACTION_MANIFEST
OS_CYLINDER: +0.25
OD_SPHERE: -0.25
OS_SPHERE: -0.25
OS_SPHERE: -0.50
OS_AXIS: 065
OS_CYLINDER: +0.50
OD_SPHERE: -0.25
OD_AXIS: 090
OS_AXIS: 033
OD_AXIS: 090
OD_CYLINDER: +1.50
OD_CYLINDER: +0.25

## 2021-01-27 ASSESSMENT — VISUAL ACUITY
OD_SC: 20/20 OU
OS_SC: 20/20 OU
OD_SC: 20/20
OD_SC+: -2
METHOD: SNELLEN - LINEAR
OS_SC: 20/20
OS_SC+: -1

## 2021-01-27 ASSESSMENT — SLIT LAMP EXAM - LIDS
COMMENTS: NORMAL
COMMENTS: NORMAL

## 2021-01-27 ASSESSMENT — TONOMETRY
OS_IOP_MMHG: 14
OD_IOP_MMHG: 14
IOP_METHOD: ICARE

## 2021-01-27 ASSESSMENT — CONF VISUAL FIELD
OD_NORMAL: 1
OS_NORMAL: 1
METHOD: COUNTING FINGERS

## 2021-01-27 ASSESSMENT — CUP TO DISC RATIO
OD_RATIO: 0.3
OS_RATIO: 0.3

## 2021-01-27 ASSESSMENT — EXTERNAL EXAM - RIGHT EYE: OD_EXAM: NORMAL

## 2021-01-27 ASSESSMENT — EXTERNAL EXAM - LEFT EYE: OS_EXAM: NORMAL

## 2021-01-27 NOTE — PROGRESS NOTES
Assessment/Plan  (H52.4) Presbyopia  (primary encounter diagnosis)  Comment: Early presbyopia. Adequate accommodation for near testing in clinic  Plan: REFRACTION [[id number]]        Discussed findings with patient. Suspect that low power OTC reading specs (+0.75) will work well for periods of extended computer usage. No indication at this time for prescription glasses, but patient is welcome to call or return to clinic with prolonged reading or near difficulties. Plan on monitoring every 1-2 years for changes.       Complete documentation of historical and exam elements from today's encounter can  be found in the full encounter summary report (not reduplicated in this progress  note). I personally obtained the chief complaint(s) and history of present illness. I  confirmed and edited as necessary the review of systems, past medical/surgical  history, family history, social history, and examination findings as documented by  others; and I examined the patient myself. I personally reviewed the relevant tests,  images, and reports as documented above. I formulated and edited as necessary the  assessment and plan and discussed the findings and management plan with the  patient and family.    Phan Brown, OD

## 2021-01-27 NOTE — NURSING NOTE
Chief Complaints and History of Present Illnesses   Patient presents with     Vision Changes Ou     Chief Complaint(s) and History of Present Illness(es)     Vision Changes Ou     Laterality: both eyes    Quality: blurred vision    Context: near vision    Associated symptoms: Negative for eye pain, dryness, tearing, flashes and floaters    Treatments tried: no treatments    Pain scale: 0/10              Comments     Karolina Herrera is being seen for a consult today by the request of Dr. Urbina for vision changes. Pt notes that she hasn't had an eye exam in about 13-15 years, had a wandering eye wore gls from [age], then started again in highschool for reading and throughout law school, she notices that she still has troubles reading.      [facility] January 27, 2021 10:14 AM

## 2021-01-28 ENCOUNTER — IMMUNIZATION (OUTPATIENT)
Dept: PEDIATRICS | Facility: CLINIC | Age: 41
End: 2021-01-28
Payer: COMMERCIAL

## 2021-01-28 PROCEDURE — 91300 PR COVID VAC PFIZER DIL RECON 30 MCG/0.3 ML IM: CPT

## 2021-01-28 PROCEDURE — 0001A PR COVID VAC PFIZER DIL RECON 30 MCG/0.3 ML IM: CPT

## 2021-02-18 ENCOUNTER — IMMUNIZATION (OUTPATIENT)
Dept: PEDIATRICS | Facility: CLINIC | Age: 41
End: 2021-02-18
Attending: INTERNAL MEDICINE
Payer: COMMERCIAL

## 2021-02-18 PROCEDURE — 0002A PR COVID VAC PFIZER DIL RECON 30 MCG/0.3 ML IM: CPT

## 2021-02-18 PROCEDURE — 91300 PR COVID VAC PFIZER DIL RECON 30 MCG/0.3 ML IM: CPT

## 2021-03-01 ENCOUNTER — OFFICE VISIT (OUTPATIENT)
Dept: DERMATOLOGY | Facility: CLINIC | Age: 41
End: 2021-03-01
Attending: NURSE PRACTITIONER
Payer: COMMERCIAL

## 2021-03-01 DIAGNOSIS — Z08 ENCOUNTER FOR FOLLOW-UP SURVEILLANCE OF SKIN CANCER: ICD-10-CM

## 2021-03-01 DIAGNOSIS — Z85.828 ENCOUNTER FOR FOLLOW-UP SURVEILLANCE OF SKIN CANCER: ICD-10-CM

## 2021-03-01 DIAGNOSIS — Z12.83 SKIN CANCER SCREENING: Primary | ICD-10-CM

## 2021-03-01 DIAGNOSIS — D22.9 MULTIPLE PIGMENTED NEVI: ICD-10-CM

## 2021-03-01 DIAGNOSIS — Z80.8 FAMILY HISTORY OF NONMELANOMA SKIN CANCER: ICD-10-CM

## 2021-03-01 PROCEDURE — 99203 OFFICE O/P NEW LOW 30 MIN: CPT | Performed by: PHYSICIAN ASSISTANT

## 2021-03-01 ASSESSMENT — PAIN SCALES - GENERAL: PAINLEVEL: NO PAIN (0)

## 2021-03-01 NOTE — LETTER
3/1/2021       RE: Cari Herrera  [address]  [facility] [address]     Dear Colleague,    Thank you for referring your patient, Cari Herrera, to the [facility]ERMATOLOG[facility] [place] at [facility]. Please see a copy of my visit note below.    [facility] Dermatology Note  Encounter Date: Mar 1, 2021  Office Visit     Dermatology Problem List:  1. Skin cancer screening 3/1/21   2. Hand eczema, emollients, gentle skin cares    ____________________________________________    Assessment & Plan:     # Skin cancer screening without concerning findings (nevi and solar lentigines ).   - ABCDEs: Counseled ABCDEs of melanoma: Asymmetry, Border (irregularity), Color (not uniform, changes in color), Diameter (greater than 6 mm which is about the size of a pencil eraser), and Evolving (any changes in preexisting moles).  - Sun protection: Counseled SPF30+ sunscreen, UPF clothing, sun avoidance, tanning bed avoidance.       # Hand dermatitis, mild, chronic with exacerbations (weather, frequent hand washing)  - Moisturize: Recommend good OTC moisturizer to hands frequently and full body, such as Cera-Ve, Sania-cream, or Cetaphil. Advised best to apply after bathing to lock in moisture.   - gentle skin care ed given.     # Family hx of non melanoma skin cancer in mother,  Recommend annual skin checks     Procedures Performed:   None  None    Follow-up: 1 year(s) in-person, or earlier for new or changing lesions    Staff:     All risks, benefits and alternatives were discussed with patient.  Patient is in agreement and understands the assessment and plan.  All questions were answered.  Sun Screen Education was given.   Return to Clinic annually or sooner as needed.   Milvai Rodriguez PA-C   ____________________________________________    CC: Skin Check (cari is coming in today for a skin check, states that she has no new spots of concern  today)    HPI:  Ms. Karolina Herrera is a(n) 41 year old female who presents today as a new patient as a referral from LOU Alvarez for a skin exam. No personal hx of skin cancer but her mother has had many non melanotic skin cancers  (basal cell carcinoma).  Karolina has had a lot of sun exposure over her life[place] She wore sun screen but did get some blistering burns. No history of tanning bed use.     She has some dry skin rashes on her hands at times but denies any lesions/ growths that are painful, bleeding, itchy or changing.     Patient is otherwise feeling well, without additional skin concerns.     Labs Reviewed:  BRAEDEN    Physical Exam:  Vitals: There were no vitals taken for this visit.  SKIN: Full skin, which includes the head/face, both arms, chest, back, abdomen,both legs, genitalia and/or groin buttocks, digits and/or nails, was examined.  - There are a few areas of scaling on the hands but no fissuring or thick plaques.   - Multiple regular brown pigmented macules and papules are identified on the trunk and extremities.   Scattered brown macules on sun exposed areas..   - No other lesions of concern on areas examined.     Medications:  Current Outpatient Medications   Medication     albuterol (PROAIR HFA/PROVENTIL HFA/VENTOLIN HFA) 108 (90 Base) MCG/ACT inhaler     Multiple Vitamins-Minerals (MULTIVITAMIN ADULT PO)     sertraline (ZOLOFT) 25 MG tablet     No current facility-administered medications for this visit.       Past Medical History:   Patient Active Problem List   Diagnosis     Family history of malignant neoplasm of breast     PCOS (polycystic ovarian syndrome)     Irritable bowel syndrome without diarrhea     BMI 40.0-44.9, adult (H)     ACP (advance care planning)     History of gestational diabetes     Vitamin D deficiency     Irritable bowel syndrome     Past Medical History:   Diagnosis Date     Abnormal vaginal bleeding 2008 and 2009     Amblyopia       At high risk for breast cancer      Encounter for gynecological examination without abnormal finding 10/19/2015    Overview:  Wife is Peyton.  Both are . Pap neg 6/10/ 2013: record in Care Everywhere from [facility]      Family history of malignant neoplasm of breast      High risk pregnancy, antepartum 3/30/2018    Overview:  Prenatal provider & planned delivery site: [facility] Wife is Peyton.   IVF through [facility] with donor from sperm bank.  Same donor as first pregnancy with their son Huy.  Labs and records requested from [facility].   Initial prenatal bloodwork ___  GC/CT ___- These labs were deferred to wait for records from [facility].   Pregravid BMI >40.  Early GCT ____ UC done.  Pap up to date 7/17 Geneti     Low back pain 6/18/2014     Morbid obesity due to excess calories (H) 3/25/2016     Obesity with body mass index 30 or greater 12/31/2014       CC Kim Urbina, APRN CNP  [address]  [place] [address] on close of this encounter.

## 2021-03-01 NOTE — NURSING NOTE
Dermatology Rooming Note    Karolina Herrera's goals for this visit include:   Chief Complaint   Patient presents with     Skin Check     karolina is coming in today for a skin check, states that she has no new spots of concern today     Dorie Garcia CMA on 3/1/2021 at 1:35 PM

## 2021-03-01 NOTE — PROGRESS NOTES
[facility] Dermatology Note  Encounter Date: Mar 1, 2021  Office Visit     Dermatology Problem List:  1. Skin cancer screening 3/1/21   2. Hand eczema, emollients, gentle skin cares    ____________________________________________    Assessment & Plan:     # Skin cancer screening without concerning findings (nevi and solar lentigines ).   - ABCDEs: Counseled ABCDEs of melanoma: Asymmetry, Border (irregularity), Color (not uniform, changes in color), Diameter (greater than 6 mm which is about the size of a pencil eraser), and Evolving (any changes in preexisting moles).  - Sun protection: Counseled SPF30+ sunscreen, UPF clothing, sun avoidance, tanning bed avoidance.       # Hand dermatitis, mild, chronic with exacerbations (weather, frequent hand washing)  - Moisturize: Recommend good OTC moisturizer to hands frequently and full body, such as Cera-Ve, Sania-cream, or Cetaphil. Advised best to apply after bathing to lock in moisture.   - gentle skin care ed given.     # Family hx of non melanoma skin cancer in mother,  Recommend annual skin checks     Procedures Performed:   None  None    Follow-up: 1 year(s) in-person, or earlier for new or changing lesions    Staff:     All risks, benefits and alternatives were discussed with patient.  Patient is in agreement and understands the assessment and plan.  All questions were answered.  Sun Screen Education was given.   Return to Clinic annually or sooner as needed.   Milvia Rodriguez PA-C   ____________________________________________    CC: Skin Check (cari is coming in today for a skin check, states that she has no new spots of concern today)    HPI:  Ms. Cari Herrera is a(n) 41 year old female who presents today as a new patient as a referral from LOU Alvarez for a skin exam. No personal hx of skin cancer but her mother has had many non melanotic skin cancers  (basal cell carcinoma).  Cari has had a lot of sun exposure over her life,  [place] She wore sun screen but did get some blistering burns. No history of tanning bed use.     She has some dry skin rashes on her hands at times but denies any lesions/ growths that are painful, bleeding, itchy or changing.     Patient is otherwise feeling well, without additional skin concerns.     Labs Reviewed:  BRAEDEN    Physical Exam:  Vitals: There were no vitals taken for this visit.  SKIN: Full skin, which includes the head/face, both arms, chest, back, abdomen,both legs, genitalia and/or groin buttocks, digits and/or nails, was examined.  - There are a few areas of scaling on the hands but no fissuring or thick plaques.   - Multiple regular brown pigmented macules and papules are identified on the trunk and extremities.   Scattered brown macules on sun exposed areas..   - No other lesions of concern on areas examined.     Medications:  Current Outpatient Medications   Medication     albuterol (PROAIR HFA/PROVENTIL HFA/VENTOLIN HFA) 108 (90 Base) MCG/ACT inhaler     Multiple Vitamins-Minerals (MULTIVITAMIN ADULT PO)     sertraline (ZOLOFT) 25 MG tablet     No current facility-administered medications for this visit.       Past Medical History:   Patient Active Problem List   Diagnosis     Family history of malignant neoplasm of breast     PCOS (polycystic ovarian syndrome)     Irritable bowel syndrome without diarrhea     BMI 40.0-44.9, adult (H)     ACP (advance care planning)     History of gestational diabetes     Vitamin D deficiency     Irritable bowel syndrome     Past Medical History:   Diagnosis Date     Abnormal vaginal bleeding 2008 and 2009     Amblyopia      At high risk for breast cancer      Encounter for gynecological examination without abnormal finding 10/19/2015    Overview:  Wife is Peyton.  Both are . Pap neg 6/10/ 2013: record in Care Everywhere from [facility]      Family history of malignant neoplasm of breast      High risk pregnancy, antepartum 3/30/2018     Overview:  Prenatal provider & planned delivery site: [facility] Wife is Peyton.   IVF through [facility] with donor from sperm bank.  Same donor as first pregnancy with their son Huy.  Labs and records requested from [facility].   Initial prenatal bloodwork ___  GC/CT ___- These labs were deferred to wait for records from [facility].   Pregravid BMI >40.  Early GCT ____ UC done.  Pap up to date 7/17 Geneti     Low back pain 6/18/2014     Morbid obesity due to excess calories (H) 3/25/2016     Obesity with body mass index 30 or greater 12/31/2014       CC Kim Urbina, APRN CNP  [address]  [place] [address] on close of this encounter.

## 2021-03-02 ASSESSMENT — VISUAL ACUITY: METHOD_MR_RETINOSCOPY: 1

## 2021-04-23 ENCOUNTER — ANCILLARY PROCEDURE (OUTPATIENT)
Dept: MAMMOGRAPHY | Facility: CLINIC | Age: 41
End: 2021-04-23
Attending: CLINICAL NURSE SPECIALIST
Payer: COMMERCIAL

## 2021-04-23 DIAGNOSIS — Z12.31 VISIT FOR SCREENING MAMMOGRAM: ICD-10-CM

## 2021-04-23 PROCEDURE — 77063 BREAST TOMOSYNTHESIS BI: CPT | Performed by: RADIOLOGY

## 2021-04-23 PROCEDURE — 77067 SCR MAMMO BI INCL CAD: CPT | Performed by: RADIOLOGY

## 2021-08-31 ENCOUNTER — ONCOLOGY VISIT (OUTPATIENT)
Dept: ONCOLOGY | Facility: CLINIC | Age: 41
End: 2021-08-31
Attending: CLINICAL NURSE SPECIALIST
Payer: COMMERCIAL

## 2021-08-31 VITALS
HEIGHT: 68 IN | HEART RATE: 81 BPM | WEIGHT: 293 LBS | SYSTOLIC BLOOD PRESSURE: 114 MMHG | OXYGEN SATURATION: 95 % | RESPIRATION RATE: 18 BRPM | TEMPERATURE: 98.1 F | BODY MASS INDEX: 44.41 KG/M2 | DIASTOLIC BLOOD PRESSURE: 81 MMHG

## 2021-08-31 DIAGNOSIS — Z12.39 BREAST CANCER SCREENING, HIGH RISK PATIENT: Primary | ICD-10-CM

## 2021-08-31 DIAGNOSIS — Z80.3 FAMILY HISTORY OF MALIGNANT NEOPLASM OF BREAST: ICD-10-CM

## 2021-08-31 PROCEDURE — G0463 HOSPITAL OUTPT CLINIC VISIT: HCPCS

## 2021-08-31 PROCEDURE — 99213 OFFICE O/P EST LOW 20 MIN: CPT | Performed by: CLINICAL NURSE SPECIALIST

## 2021-08-31 ASSESSMENT — PAIN SCALES - GENERAL: PAINLEVEL: NO PAIN (0)

## 2021-08-31 ASSESSMENT — MIFFLIN-ST. JEOR: SCORE: 2106.51

## 2021-08-31 NOTE — PROGRESS NOTES
Oncology Risk Management Consultation:  Date on this visit: 2021    Karolina Herrera  requires heightened screening and surveillance for her higher risk of breast cancer, secondary to a  family history of invasive ductal carcinoma of the breast in her sister at age 39.  She is considered to be at high risk for breast cancer and has a 24.4% lifetime risk for breast cancer by the TIEN model. Her risk within the next 5 years is 1.4% by TIEN.     Primary Physician:  MIKEL Lopez-SHANNAN     History Of Present Illness:  Ms. Herrera is a very pleasant, healthy  41 year old female who presents with a family history of breast cancer.      Genetic testing:  No genetic testing to date. Her sister, who had invasive ductal carcinoma at age 39, by report, was negative for genetic mutations in the BRCA1 and BRCA2 genes and Karolina has not had genetic testing.      Pertinent health history:    Menarche at 12.  First child at age 35, conceived using one cycle of IVF.   Premenopausal.  Ovaries and uterus intact.   OCP use for one year.  Polycystic ovarian syndrome  Scattered fibroglandular densities.  Hx of mastitis in  with breastfeeding, resolved with antibiotics.  Hx of right breast pain with lactation. No infection 2019; treated with tylenol and ibuprofen. Suspected clogged milk duct.  History of breastfeeding x 13 months for Huy and 4-5 months for Celia   No history of breast biopsy.  No history of hyperplasia, atypia or malignancy     Pertinent Screening History:  2016 - Screening mammogram, [email or web address]  2017 - Breast MRI, [email or web address]  2017 - Screening mammogram, BiRads1  2019 - Breast MRI, BiRads1  2020- Screening tomosynthesis mammogram, [email or web address]  2021- Screening tomosynthesis mammogram, BiRads1     At this visit, she denies asymmetry, lumps, masses, thickening, pain, nipple discharge and skin changes.      Past Medical/Surgical History:  Past Medical History:   Diagnosis Date      Abnormal vaginal bleeding  and      Amblyopia      At high risk for breast cancer      Encounter for gynecological examination without abnormal finding 10/19/2015    Overview:  Wife is Peyton.  Both are . Pap neg 6/10/ 2013: record in Care Everywhere from [facility]      Family history of malignant neoplasm of breast      High risk pregnancy, antepartum 3/30/2018    Overview:  Prenatal provider & planned delivery site: [facility] Wife is Peyton.   IVF through [facility] with donor from sperm bank.  Same donor as first pregnancy with their son Huy.  Labs and records requested from [facility].   Initial prenatal bloodwork ___  GC/CT ___- These labs were deferred to wait for records from [facility].   Pregravid BMI >40.  Early GCT ____ UC done.  Pap up to date  Geneti     Low back pain 2014     Morbid obesity due to excess calories (H) 3/25/2016     Obesity with body mass index 30 or greater 2014     Past Surgical History:   Procedure Laterality Date     GYN SURGERY  2014    Polypectomy     IVS      invitro fertilization     LAPAROSCOPIC CHOLECYSTECTOMY N/A 2016    Procedure: LAPAROSCOPIC CHOLECYSTECTOMY;  Surgeon: Juan F Sherman MD;  [facility]     wisdom teeth removed         Allergies:  Allergies as of 2021     (No Known Allergies)       Current Medications:  Current Outpatient Medications   Medication Sig Dispense Refill     albuterol (PROAIR HFA/PROVENTIL HFA/VENTOLIN HFA) 108 (90 Base) MCG/ACT inhaler Inhale 2 puffs into the lungs every 4 hours as needed for shortness of breath / dyspnea, wheezing or other (cough) 1 Inhaler 0     Multiple Vitamins-Minerals (MULTIVITAMIN ADULT PO) Take 1 tablet by mouth       sertraline (ZOLOFT) 25 MG tablet           Family History:  Family History   Problem Relation Age of Onset     Skin Cancer Mother      Glaucoma Father      Esophageal Cancer Maternal Grandfather          in 60s; hx of smoking     Bladder Cancer Paternal  Grandfather          in 80s     Breast Cancer Sister 39        invasive ductal carcinoma, treated with lumpectomy, radiation and tamoxifen     Breast Cancer Paternal Aunt         paternal great aunt in 70s     Breast Cancer Sister         Age 39 at diagnosis     Macular Degeneration No family hx of        Social History:  Social History     Socioeconomic History     Marital status:      Spouse name: Samantha     Number of children: 2     Years of education: Not on file     Highest education level: Not on file   Occupational History     Comment: works for [employer]   Tobacco Use     Smoking status: Never Smoker     Smokeless tobacco: Never Used   Substance and Sexual Activity     Alcohol use: Yes     Comment: 1-2 per week.     Drug use: No     Sexual activity: Yes     Partners: Female     Birth control/protection: None   Other Topics Concern      Service No     Blood Transfusions No     Caffeine Concern No     Occupational Exposure No     Hobby Hazards No     Sleep Concern No     Stress Concern No     Weight Concern No     Special Diet No     Back Care No     Exercise Yes     Comment: walks dog 2-3 times/day; prenatal exercises     Bike Helmet Not Asked     Seat Belt Yes     Self-Exams Yes     Parent/sibling w/ CABG, MI or angioplasty before 65F 55M? No   Social History Narrative     Not on file     Social Determinants of Health     Financial Resource Strain:      Difficulty of Paying Living Expenses:    Food Insecurity:      Worried About Running Out of Food in the Last Year:      Ran Out of Food in the Last Year:    Transportation Needs:      Lack of Transportation (Medical):      Lack of Transportation (Non-Medical):    Physical Activity:      Days of Exercise per Week:      Minutes of Exercise per Session:    Stress:      Feeling of Stress :    Social Connections:      Frequency of Communication with Friends and Family:      Frequency of Social Gatherings with Friends and Family:       "Attends [employer] Services:      Active Member of Clubs or Organizations:      Attends Club or Organization Meetings:      Marital Status:    Intimate Partner Violence:      Fear of Current or Ex-Partner:      Emotionally Abused:      Physically Abused:      Sexually Abused:        Physical Exam:  /81   Pulse 81   Temp 98.1  F (36.7  C) (Oral)   Resp 18   Ht 1.727 m (5' 8\")   Wt 138.8 kg (306 lb)   SpO2 95%   BMI 46.53 kg/m      GENERAL APPEARANCE: healthy, alert and no distress  NECK: no adenopathy, no asymmetry or masses     LYMPHATICS: No cervical, supraclavicular or  axillary lymphadenopathy     RESP: lungs clear to auscultation - no rales, rhonchi or wheezes     BREAST: A multipositional, bilateral breast exam was performed.  Symmetrical. Right breast: no palpable dominant masses, no nipple discharge, no skin changes. Dense tissue.  Right axilla: no palpable adenopathy. Left breast: no palpable dominant masses, no nipple discharge, no skin changes. Left axilla: no palpable adenopathy. Dense tissue.   CARDIOVASCULAR: regular rate and rhythm, normal S1 S2, no S3 or S4 and no murmur.        SKIN: no suspicious lesions or rashes    Laboratory/Imaging Studies  No results found for any visits on 08/31/21.    ASSESSMENT  We discussed her last screening tests and reviewed her interval history. She has been doing well and staying close to home. Huy is starting  in person this year and Celia is still in .  The family took a trip to [place] recently and she has been enjoying the summer as much as possible, continuing to work from home.     We reviewed her family history; there are no changes to report.    We discussed that she does not like the breast MRIs, as she finds them uncomfortable and feels somewhat nauseous from the contrast dye.  She feels slightly claustrophobic. We discussed that I could prescribe her some medication to take prior to the MRI, but she declined.  She is going to " schedule the MRI for October; she understands the benefit for her is mostly in receiving early detection. If she chooses to not have these in the future, she can continue with annual mammograms and understands the rationale for not doing 2 screening mammograms per year.   Individualized Surveillance Plan for women  With 20% or greater lifetime risk of breast cancer   Per NCCN Breast Cancer Screening and Diagnosis Guidelines Version 1.2020   Recommended screening Test or procedure Last done Next Scheduled    Clinical encounter Clinical exam every 6-12 months.   Refer to genetic counseling if not already done.  Consider risk reduction strategies.   8/31/2021 April 2022   However, some family histories with breast cancers at a very young age, may warrant screening starting earlier.    *May begin at age 40 if breast cancers in the family occur at later ages.    Annual mammogram beginning 10 years younger than the earliest breast cancer in the family but not prior to age 30.    Recommend annual breast MRI to begin 10 years younger than the earliest breast cancer in the family but not prior to age 25.    Breast MRIs are preferably done on day 7-15 of the menstrual cycle in premenopausal women. 8/8/2019 - Breast MRI, BiRads1    4/23/2021- Screening tomosynthesis mammogram, BiRads1 Breast MRI in October    Mammogram in late April after exam (4/24 or later)   Breast screening for patients at high risk due to thoracic radiation between the ages of 10-30   Annual clinical exam beginning 8 years after radiation therapy.    Annual screening mammogram beginning at age 30 or 8 years after radiation therapy    Annual breast MRI, beginning at age 25 or 8 years after radiation therapy.       NA     NA   Women who have a lifetime risk of >20% based on history of LCIS or ADH/ALH Annual screening mammogram beginning at age of LCIS or ADH/ALH but not prior to age 30.    Consider annual MRI to begin at age of diagnosis of LCIS or  ADH/ALH but not prior to age 25.    Consider risk reducing strategies.     NA     NA    Recommend risk reducing strategies for women with 1.7% 5 year risk of breast cancer. 1.4% risk NA     I spent a total of 20 minutes on the day of the visit. Please see the note for further information on patient assessment and treatment.    MIKEL Richey-CNS, OCN, AGN-BC  Clinical Nurse Specialist  Cancer Risk Management Program  [facility] [address]  [place] [address]    phone:  [phone]  Pager: [phone]  fax: [phone]    CC: PELON Maurer

## 2021-08-31 NOTE — NURSING NOTE
"Oncology Rooming Note    August 31, 2021 1:06 PM   Karolina Herrera is a 41 year old female who presents for:    Chief Complaint   Patient presents with     Oncology Clinic Visit     Breast cancer screening, high risk patient (Primary Dx); Family history of malignant neoplasm of breast      Initial Vitals: /81   Pulse 81   Temp 98.1  F (36.7  C) (Oral)   Resp 18   Ht 1.727 m (5' 8\")   Wt 138.8 kg (306 lb)   SpO2 95%   BMI 46.53 kg/m   Estimated body mass index is 46.53 kg/m  as calculated from the following:    Height as of this encounter: 1.727 m (5' 8\").    Weight as of this encounter: 138.8 kg (306 lb). Body surface area is 2.58 meters squared.  No Pain (0) Comment: Data Unavailable   No LMP recorded. (Menstrual status: Breast Feeding).  Allergies reviewed: Yes  Medications reviewed: Yes    Medications: Medication refills not needed today.  Pharmacy name entered into [facility]:    [id number] [place] [facility] - [place], [place] - [address] [id number] - [place], [place] - [address] AT [address]    Clinical concerns: NONE.       Kassi Christine CMA            "

## 2021-08-31 NOTE — LETTER
2021         RE: Karolina Herrera  [address]  [facility] [address]        Dear Colleague,    Thank you for referring your patient, Karolina Herrera, to the [facility]. Please see a copy of my visit note below.    Oncology Risk Management Consultation:  Date on this visit: 2021    Karolina Herrera  requires heightened screening and surveillance for her higher risk of breast cancer, secondary to a  family history of invasive ductal carcinoma of the breast in her sister at age 39.  She is considered to be at high risk for breast cancer and has a 24.4% lifetime risk for breast cancer by the TIEN model. Her risk within the next 5 years is 1.4% by TIEN.     Primary Physician:  MIKEL Lopez-SHANNAN     History Of Present Illness:  Ms. Herrera is a very pleasant, healthy  41 year old female who presents with a family history of breast cancer.      Genetic testing:  No genetic testing to date. Her sister, who had invasive ductal carcinoma at age 39, by report, was negative for genetic mutations in the BRCA1 and BRCA2 genes and Karolina has not had genetic testing.      Pertinent health history:    Menarche at 12.  First child at age 35, conceived using one cycle of IVF.   Premenopausal.  Ovaries and uterus intact.   OCP use for one year.  Polycystic ovarian syndrome  Scattered fibroglandular densities.  Hx of mastitis in  with breastfeeding, resolved with antibiotics.  Hx of right breast pain with lactation. No infection 2019; treated with tylenol and ibuprofen. Suspected clogged milk duct.  History of breastfeeding x 13 months for Huy and 4-5 months for Celia   No history of breast biopsy.  No history of hyperplasia, atypia or malignancy     Pertinent Screening History:  2016 - Screening mammogram, [email or web address]  2017 - Breast MRI, [email or web address]  2017 - Screening mammogram, BiRads1  2019 - Breast MRI, [email or web address]  2020- Screening tomosynthesis  mammogram, [email or web address]  4/23/2021- Screening tomosynthesis mammogram, [email or web address]     At this visit, she denies asymmetry, lumps, masses, thickening, pain, nipple discharge and skin changes.      Past Medical/Surgical History:  Past Medical History:   Diagnosis Date     Abnormal vaginal bleeding 2008 and 2009     Amblyopia      At high risk for breast cancer      Encounter for gynecological examination without abnormal finding 10/19/2015    Overview:  Wife is Peyton.  Both are . Pap neg 6/10/ 2013: record in Care Everywhere from [facility]      Family history of malignant neoplasm of breast      High risk pregnancy, antepartum 3/30/2018    Overview:  Prenatal provider & planned delivery site: [facility] Wife is Peyton.   IVF through [facility] with donor from sperm bank.  Same donor as first pregnancy with their son Huy.  Labs and records requested from [facility].   Initial prenatal bloodwork ___  GC/CT ___- These labs were deferred to wait for records from [facility].   Pregravid BMI >40.  Early GCT ____ UC done.  Pap up to date 7/17 Geneti     Low back pain 6/18/2014     Morbid obesity due to excess calories (H) 3/25/2016     Obesity with body mass index 30 or greater 12/31/2014     Past Surgical History:   Procedure Laterality Date     GYN SURGERY  07/2014    Polypectomy     IVS  2015    invitro fertilization     LAPAROSCOPIC CHOLECYSTECTOMY N/A 11/07/2016    Procedure: LAPAROSCOPIC CHOLECYSTECTOMY;  Surgeon: Juan F Sherman MD;  [facility]     wisdom teeth removed         Allergies:  Allergies as of 08/31/2021     (No Known Allergies)       Current Medications:  Current Outpatient Medications   Medication Sig Dispense Refill     albuterol (PROAIR HFA/PROVENTIL HFA/VENTOLIN HFA) 108 (90 Base) MCG/ACT inhaler Inhale 2 puffs into the lungs every 4 hours as needed for shortness of breath / dyspnea, wheezing or other (cough) 1 Inhaler 0     Multiple Vitamins-Minerals (MULTIVITAMIN ADULT PO) Take 1 tablet by mouth        sertraline (ZOLOFT) 25 MG tablet           Family History:  Family History   Problem Relation Age of Onset     Skin Cancer Mother      Glaucoma Father      Esophageal Cancer Maternal Grandfather          in 60s; hx of smoking     Bladder Cancer Paternal Grandfather          in 80s     Breast Cancer Sister 39        invasive ductal carcinoma, treated with lumpectomy, radiation and tamoxifen     Breast Cancer Paternal Aunt         paternal great aunt in 70s     Breast Cancer Sister         Age 39 at diagnosis     Macular Degeneration No family hx of        Social History:  Social History     Socioeconomic History     Marital status:      Spouse name: Samantha     Number of children: 2     Years of education: Not on file     Highest education level: Not on file   Occupational History     Comment: works for [employer]   Tobacco Use     Smoking status: Never Smoker     Smokeless tobacco: Never Used   Substance and Sexual Activity     Alcohol use: Yes     Comment: 1-2 per week.     Drug use: No     Sexual activity: Yes     Partners: Female     Birth control/protection: None   Other Topics Concern      Service No     Blood Transfusions No     Caffeine Concern No     Occupational Exposure No     Hobby Hazards No     Sleep Concern No     Stress Concern No     Weight Concern No     Special Diet No     Back Care No     Exercise Yes     Comment: walks dog 2-3 times/day; prenatal exercises     Bike Helmet Not Asked     Seat Belt Yes     Self-Exams Yes     Parent/sibling w/ CABG, MI or angioplasty before 65F 55M? No   Social History Narrative     Not on file     Social Determinants of Health     Financial Resource Strain:      Difficulty of Paying Living Expenses:    Food Insecurity:      Worried About Running Out of Food in the Last Year:      Ran Out of Food in the Last Year:    Transportation Needs:      Lack of Transportation (Medical):      Lack of Transportation (Non-Medical):    Physical  "Activity:      Days of Exercise per Week:      Minutes of Exercise per Session:    Stress:      Feeling of Stress :    Social Connections:      Frequency of Communication with Friends and Family:      Frequency of Social Gatherings with Friends and Family:      Attends [employer] Services:      Active Member of Clubs or Organizations:      Attends Club or Organization Meetings:      Marital Status:    Intimate Partner Violence:      Fear of Current or Ex-Partner:      Emotionally Abused:      Physically Abused:      Sexually Abused:        Physical Exam:  /81   Pulse 81   Temp 98.1  F (36.7  C) (Oral)   Resp 18   Ht 1.727 m (5' 8\")   Wt 138.8 kg (306 lb)   SpO2 95%   BMI 46.53 kg/m      GENERAL APPEARANCE: healthy, alert and no distress  NECK: no adenopathy, no asymmetry or masses     LYMPHATICS: No cervical, supraclavicular or  axillary lymphadenopathy     RESP: lungs clear to auscultation - no rales, rhonchi or wheezes     BREAST: A multipositional, bilateral breast exam was performed.  Symmetrical. Right breast: no palpable dominant masses, no nipple discharge, no skin changes. Dense tissue.  Right axilla: no palpable adenopathy. Left breast: no palpable dominant masses, no nipple discharge, no skin changes. Left axilla: no palpable adenopathy. Dense tissue.   CARDIOVASCULAR: regular rate and rhythm, normal S1 S2, no S3 or S4 and no murmur.        SKIN: no suspicious lesions or rashes    Laboratory/Imaging Studies  No results found for any visits on 08/31/21.    ASSESSMENT  We discussed her last screening tests and reviewed her interval history. She has been doing well and staying close to home. Huy is starting  in person this year and Celia is still in .  The family took a trip to [place] recently and she has been enjoying the summer as much as possible, continuing to work from home.     We reviewed her family history; there are no changes to report.    We discussed that she does " not like the breast MRIs, as she finds them uncomfortable and feels somewhat nauseous from the contrast dye.  She feels slightly claustrophobic. We discussed that I could prescribe her some medication to take prior to the MRI, but she declined.  She is going to schedule the MRI for October; she understands the benefit for her is mostly in receiving early detection. If she chooses to not have these in the future, she can continue with annual mammograms and understands the rationale for not doing 2 screening mammograms per year.   Individualized Surveillance Plan for women  With 20% or greater lifetime risk of breast cancer   Per NCCN Breast Cancer Screening and Diagnosis Guidelines Version 1.2020   Recommended screening Test or procedure Last done Next Scheduled    Clinical encounter Clinical exam every 6-12 months.   Refer to genetic counseling if not already done.  Consider risk reduction strategies.   8/31/2021 April 2022   However, some family histories with breast cancers at a very young age, may warrant screening starting earlier.    *May begin at age 40 if breast cancers in the family occur at later ages.    Annual mammogram beginning 10 years younger than the earliest breast cancer in the family but not prior to age 30.    Recommend annual breast MRI to begin 10 years younger than the earliest breast cancer in the family but not prior to age 25.    Breast MRIs are preferably done on day 7-15 of the menstrual cycle in premenopausal women. 8/8/2019 - Breast MRI, [email or web address]    4/23/2021- Screening tomosynthesis mammogram, BiRads1 Breast MRI in October    Mammogram in late April after exam (4/24 or later)   Breast screening for patients at high risk due to thoracic radiation between the ages of 10-30   Annual clinical exam beginning 8 years after radiation therapy.    Annual screening mammogram beginning at age 30 or 8 years after radiation therapy    Annual breast MRI, beginning at age 25 or 8 years after  radiation therapy.       NA     NA   Women who have a lifetime risk of >20% based on history of LCIS or ADH/ALH Annual screening mammogram beginning at age of LCIS or ADH/ALH but not prior to age 30.    Consider annual MRI to begin at age of diagnosis of LCIS or ADH/ALH but not prior to age 25.    Consider risk reducing strategies.     NA     NA    Recommend risk reducing strategies for women with 1.7% 5 year risk of breast cancer. 1.4% risk NA     I spent a total of 20 minutes on the day of the visit. Please see the note for further information on patient assessment and treatment.    MIKEL Richey-CNS, OCN, AGN-BC  Clinical Nurse Specialist  Cancer Risk Management Program  [facility] [address]  [place] [address]    phone:  [phone]  Pager: [phone]  fax: [phone]    CC: PELON Maurer                    Again, thank you for allowing me to participate in the care of your patient.        Sincerely,        MIKEL Richey CNS

## 2021-08-31 NOTE — PATIENT INSTRUCTIONS
Individualized Surveillance Plan for women  With 20% or greater lifetime risk of breast cancer   Per NCCN Breast Cancer Screening and Diagnosis Guidelines Version 1.2020   Recommended screening Test or procedure Last done Next Scheduled    Clinical encounter Clinical exam every 6-12 months.   Refer to genetic counseling if not already done.  Consider risk reduction strategies.   8/31/2021 April 2022   However, some family histories with breast cancers at a very young age, may warrant screening starting earlier.    *May begin at age 40 if breast cancers in the family occur at later ages.    Annual mammogram beginning 10 years younger than the earliest breast cancer in the family but not prior to age 30.    Recommend annual breast MRI to begin 10 years younger than the earliest breast cancer in the family but not prior to age 25.    Breast MRIs are preferably done on day 7-15 of the menstrual cycle in premenopausal women. 8/8/2019 - Breast MRI, [email or web address]    4/23/2021- Screening tomosynthesis mammogram, BiRads1 Breast MRI in October    Mammogram in late April after exam (4/24 or later)   Breast screening for patients at high risk due to thoracic radiation between the ages of 10-30   Annual clinical exam beginning 8 years after radiation therapy.    Annual screening mammogram beginning at age 30 or 8 years after radiation therapy    Annual breast MRI, beginning at age 25 or 8 years after radiation therapy.       NA     NA   Women who have a lifetime risk of >20% based on history of LCIS or ADH/ALH Annual screening mammogram beginning at age of LCIS or ADH/ALH but not prior to age 30.    Consider annual MRI to begin at age of diagnosis of LCIS or ADH/ALH but not prior to age 25.    Consider risk reducing strategies.     NA     NA    Recommend risk reducing strategies for women with 1.7% 5 year risk of breast cancer.

## 2021-08-31 NOTE — LETTER
2021      RE: Karolina Herrera  [address]  [facility] [address]       Oncology Risk Management Consultation:  Date on this visit: 2021    Karolina Herrera  requires heightened screening and surveillance for her higher risk of breast cancer, secondary to a  family history of invasive ductal carcinoma of the breast in her sister at age 39.  She is considered to be at high risk for breast cancer and has a 24.4% lifetime risk for breast cancer by the TIEN model. Her risk within the next 5 years is 1.4% by TIEN.     Primary Physician:  MIKEL Lopez-SHANNAN     History Of Present Illness:  Ms. Herrera is a very pleasant, healthy  41 year old female who presents with a family history of breast cancer.      Genetic testing:  No genetic testing to date. Her sister, who had invasive ductal carcinoma at age 39, by report, was negative for genetic mutations in the BRCA1 and BRCA2 genes and Karolina has not had genetic testing.      Pertinent health history:    Menarche at 12.  First child at age 35, conceived using one cycle of IVF.   Premenopausal.  Ovaries and uterus intact.   OCP use for one year.  Polycystic ovarian syndrome  Scattered fibroglandular densities.  Hx of mastitis in  with breastfeeding, resolved with antibiotics.  Hx of right breast pain with lactation. No infection 2019; treated with tylenol and ibuprofen. Suspected clogged milk duct.  History of breastfeeding x 13 months for Huy and 4-5 months for Celia   No history of breast biopsy.  No history of hyperplasia, atypia or malignancy     Pertinent Screening History:  2016 - Screening mammogram, [email or web address]  2017 - Breast MRI, BiRads1  2017 - Screening mammogram, BiRads1  2019 - Breast MRI, BiRads1  2020- Screening tomosynthesis mammogram, BiRads1  2021- Screening tomosynthesis mammogram, BiRads1     At this visit, she denies asymmetry, lumps, masses, thickening, pain, nipple discharge and skin changes.       Past Medical/Surgical History:  Past Medical History:   Diagnosis Date     Abnormal vaginal bleeding 2008 and 2009     Amblyopia      At high risk for breast cancer      Encounter for gynecological examination without abnormal finding 10/19/2015    Overview:  Wife is Peyton.  Both are . Pap neg 6/10/ 2013: record in Care Everywhere from [facility]      Family history of malignant neoplasm of breast      High risk pregnancy, antepartum 3/30/2018    Overview:  Prenatal provider & planned delivery site: [facility] Wife is Peyton.   IVF through [facility] with donor from sperm bank.  Same donor as first pregnancy with their son Huy.  Labs and records requested from [facility].   Initial prenatal bloodwork ___  GC/CT ___- These labs were deferred to wait for records from [facility].   Pregravid BMI >40.  Early GCT ____ UC done.  Pap up to date 7/17 Geneti     Low back pain 6/18/2014     Morbid obesity due to excess calories (H) 3/25/2016     Obesity with body mass index 30 or greater 12/31/2014     Past Surgical History:   Procedure Laterality Date     GYN SURGERY  07/2014    Polypectomy     IVS  2015    invitro fertilization     LAPAROSCOPIC CHOLECYSTECTOMY N/A 11/07/2016    Procedure: LAPAROSCOPIC CHOLECYSTECTOMY;  Surgeon: Juan F Sherman MD;  [facility]     wisdom teeth removed         Allergies:  Allergies as of 08/31/2021     (No Known Allergies)       Current Medications:  Current Outpatient Medications   Medication Sig Dispense Refill     albuterol (PROAIR HFA/PROVENTIL HFA/VENTOLIN HFA) 108 (90 Base) MCG/ACT inhaler Inhale 2 puffs into the lungs every 4 hours as needed for shortness of breath / dyspnea, wheezing or other (cough) 1 Inhaler 0     Multiple Vitamins-Minerals (MULTIVITAMIN ADULT PO) Take 1 tablet by mouth       sertraline (ZOLOFT) 25 MG tablet           Family History:  Family History   Problem Relation Age of Onset     Skin Cancer Mother      Glaucoma Father      Esophageal Cancer Maternal  Grandfather          in 60s; hx of smoking     Bladder Cancer Paternal Grandfather          in 80s     Breast Cancer Sister 39        invasive ductal carcinoma, treated with lumpectomy, radiation and tamoxifen     Breast Cancer Paternal Aunt         paternal great aunt in 70s     Breast Cancer Sister         Age 39 at diagnosis     Macular Degeneration No family hx of        Social History:  Social History     Socioeconomic History     Marital status:      Spouse name: Samantha     Number of children: 2     Years of education: Not on file     Highest education level: Not on file   Occupational History     Comment: works for [employer]   Tobacco Use     Smoking status: Never Smoker     Smokeless tobacco: Never Used   Substance and Sexual Activity     Alcohol use: Yes     Comment: 1-2 per week.     Drug use: No     Sexual activity: Yes     Partners: Female     Birth control/protection: None   Other Topics Concern      Service No     Blood Transfusions No     Caffeine Concern No     Occupational Exposure No     Hobby Hazards No     Sleep Concern No     Stress Concern No     Weight Concern No     Special Diet No     Back Care No     Exercise Yes     Comment: walks dog 2-3 times/day; prenatal exercises     Bike Helmet Not Asked     Seat Belt Yes     Self-Exams Yes     Parent/sibling w/ CABG, MI or angioplasty before 65F 55M? No   Social History Narrative     Not on file     Social Determinants of Health     Financial Resource Strain:      Difficulty of Paying Living Expenses:    Food Insecurity:      Worried About Running Out of Food in the Last Year:      Ran Out of Food in the Last Year:    Transportation Needs:      Lack of Transportation (Medical):      Lack of Transportation (Non-Medical):    Physical Activity:      Days of Exercise per Week:      Minutes of Exercise per Session:    Stress:      Feeling of Stress :    Social Connections:      Frequency of Communication with Friends and  "Family:      Frequency of Social Gatherings with Friends and Family:      Attends Baptism Services:      Active Member of Clubs or Organizations:      Attends Club or Organization Meetings:      Marital Status:    Intimate Partner Violence:      Fear of Current or Ex-Partner:      Emotionally Abused:      Physically Abused:      Sexually Abused:        Physical Exam:  /81   Pulse 81   Temp 98.1  F (36.7  C) (Oral)   Resp 18   Ht 1.727 m (5' 8\")   Wt 138.8 kg (306 lb)   SpO2 95%   BMI 46.53 kg/m      GENERAL APPEARANCE: healthy, alert and no distress  NECK: no adenopathy, no asymmetry or masses     LYMPHATICS: No cervical, supraclavicular or  axillary lymphadenopathy     RESP: lungs clear to auscultation - no rales, rhonchi or wheezes     BREAST: A multipositional, bilateral breast exam was performed.  Symmetrical. Right breast: no palpable dominant masses, no nipple discharge, no skin changes. Dense tissue.  Right axilla: no palpable adenopathy. Left breast: no palpable dominant masses, no nipple discharge, no skin changes. Left axilla: no palpable adenopathy. Dense tissue.   CARDIOVASCULAR: regular rate and rhythm, normal S1 S2, no S3 or S4 and no murmur.        SKIN: no suspicious lesions or rashes    Laboratory/Imaging Studies  No results found for any visits on 08/31/21.    ASSESSMENT  We discussed her last screening tests and reviewed her interval history. She has been doing well and staying close to home. Huy is starting  in person this year and Celia is still in .  The family took a trip to [place] recently and she has been enjoying the summer as much as possible, continuing to work from home.     We reviewed her family history; there are no changes to report.    We discussed that she does not like the breast MRIs, as she finds them uncomfortable and feels somewhat nauseous from the contrast dye.  She feels slightly claustrophobic. We discussed that I could prescribe her " some medication to take prior to the MRI, but she declined.  She is going to schedule the MRI for October; she understands the benefit for her is mostly in receiving early detection. If she chooses to not have these in the future, she can continue with annual mammograms and understands the rationale for not doing 2 screening mammograms per year.   Individualized Surveillance Plan for women  With 20% or greater lifetime risk of breast cancer   Per NCCN Breast Cancer Screening and Diagnosis Guidelines Version 1.2020   Recommended screening Test or procedure Last done Next Scheduled    Clinical encounter Clinical exam every 6-12 months.   Refer to genetic counseling if not already done.  Consider risk reduction strategies.   8/31/2021 April 2022   However, some family histories with breast cancers at a very young age, may warrant screening starting earlier.    *May begin at age 40 if breast cancers in the family occur at later ages.    Annual mammogram beginning 10 years younger than the earliest breast cancer in the family but not prior to age 30.    Recommend annual breast MRI to begin 10 years younger than the earliest breast cancer in the family but not prior to age 25.    Breast MRIs are preferably done on day 7-15 of the menstrual cycle in premenopausal women. 8/8/2019 - Breast MRI, [email or web address]    4/23/2021- Screening tomosynthesis mammogram, BiRads1 Breast MRI in October    Mammogram in late April after exam (4/24 or later)   Breast screening for patients at high risk due to thoracic radiation between the ages of 10-30   Annual clinical exam beginning 8 years after radiation therapy.    Annual screening mammogram beginning at age 30 or 8 years after radiation therapy    Annual breast MRI, beginning at age 25 or 8 years after radiation therapy.       NA     NA   Women who have a lifetime risk of >20% based on history of LCIS or ADH/ALH Annual screening mammogram beginning at age of LCIS or ADH/ALH but not prior to  age 30.    Consider annual MRI to begin at age of diagnosis of LCIS or ADH/ALH but not prior to age 25.    Consider risk reducing strategies.     NA     NA    Recommend risk reducing strategies for women with 1.7% 5 year risk of breast cancer. 1.4% risk NA     I spent a total of 20 minutes on the day of the visit. Please see the note for further information on patient assessment and treatment.    MIKEL Richey-CNS, OCN, AGN-BC  Clinical Nurse Specialist  Cancer Risk Management Program  [facility] [address]  [place] [address]    phone:  [phone]  Pager: [phone]  fax: [phone]    CC: PELON Maurer APRN CNS

## 2021-09-08 ENCOUNTER — IMMUNIZATION (OUTPATIENT)
Dept: PEDIATRICS | Facility: CLINIC | Age: 41
End: 2021-09-08
Payer: COMMERCIAL

## 2021-09-08 PROCEDURE — 90471 IMMUNIZATION ADMIN: CPT

## 2021-09-08 PROCEDURE — 90686 IIV4 VACC NO PRSV 0.5 ML IM: CPT

## 2021-10-15 ENCOUNTER — MYC MEDICAL ADVICE (OUTPATIENT)
Dept: FAMILY MEDICINE | Facility: CLINIC | Age: 41
End: 2021-10-15

## 2021-10-15 DIAGNOSIS — R06.83 SNORING: Primary | ICD-10-CM

## 2021-10-15 NOTE — TELEPHONE ENCOUNTER
Kim    See pt MyChart message    Referral cued to [facility] location    Estela Rdz RN   [facility]

## 2021-11-29 ENCOUNTER — ANCILLARY PROCEDURE (OUTPATIENT)
Dept: MRI IMAGING | Facility: CLINIC | Age: 41
End: 2021-11-29
Attending: CLINICAL NURSE SPECIALIST
Payer: COMMERCIAL

## 2021-11-29 DIAGNOSIS — Z80.3 FAMILY HISTORY OF MALIGNANT NEOPLASM OF BREAST: ICD-10-CM

## 2021-11-29 DIAGNOSIS — Z12.39 BREAST CANCER SCREENING, HIGH RISK PATIENT: ICD-10-CM

## 2021-11-29 PROCEDURE — 77049 MRI BREAST C-+ W/CAD BI: CPT | Performed by: STUDENT IN AN ORGANIZED HEALTH CARE EDUCATION/TRAINING PROGRAM

## 2021-11-29 PROCEDURE — A9585 GADOBUTROL INJECTION: HCPCS | Performed by: STUDENT IN AN ORGANIZED HEALTH CARE EDUCATION/TRAINING PROGRAM

## 2021-11-29 RX ORDER — GADOBUTROL 604.72 MG/ML
15 INJECTION INTRAVENOUS ONCE
Status: COMPLETED | OUTPATIENT
Start: 2021-11-29 | End: 2021-11-29

## 2021-11-29 RX ADMIN — GADOBUTROL 13 ML: 604.72 INJECTION INTRAVENOUS at 17:53

## 2022-01-12 ASSESSMENT — SLEEP AND FATIGUE QUESTIONNAIRES
HOW LIKELY ARE YOU TO NOD OFF OR FALL ASLEEP IN A CAR, WHILE STOPPED FOR A FEW MINUTES IN TRAFFIC: WOULD NEVER DOZE
HOW LIKELY ARE YOU TO NOD OFF OR FALL ASLEEP WHILE LYING DOWN TO REST IN THE AFTERNOON WHEN CIRCUMSTANCES PERMIT: HIGH CHANCE OF DOZING
HOW LIKELY ARE YOU TO NOD OFF OR FALL ASLEEP WHILE SITTING AND TALKING TO SOMEONE: WOULD NEVER DOZE
HOW LIKELY ARE YOU TO NOD OFF OR FALL ASLEEP WHEN YOU ARE A PASSENGER IN A CAR FOR AN HOUR WITHOUT A BREAK: MODERATE CHANCE OF DOZING
HOW LIKELY ARE YOU TO NOD OFF OR FALL ASLEEP WHILE SITTING AND READING: MODERATE CHANCE OF DOZING
HOW LIKELY ARE YOU TO NOD OFF OR FALL ASLEEP WHILE WATCHING TV: SLIGHT CHANCE OF DOZING
HOW LIKELY ARE YOU TO NOD OFF OR FALL ASLEEP WHILE SITTING QUIETLY AFTER LUNCH WITHOUT ALCOHOL: SLIGHT CHANCE OF DOZING
HOW LIKELY ARE YOU TO NOD OFF OR FALL ASLEEP WHILE SITTING INACTIVE IN A PUBLIC PLACE: SLIGHT CHANCE OF DOZING

## 2022-01-15 ENCOUNTER — HEALTH MAINTENANCE LETTER (OUTPATIENT)
Age: 42
End: 2022-01-15

## 2022-01-18 ENCOUNTER — VIRTUAL VISIT (OUTPATIENT)
Dept: SLEEP MEDICINE | Facility: CLINIC | Age: 42
End: 2022-01-18
Attending: NURSE PRACTITIONER
Payer: COMMERCIAL

## 2022-01-18 VITALS — BODY MASS INDEX: 45.99 KG/M2 | WEIGHT: 293 LBS | HEIGHT: 67 IN

## 2022-01-18 DIAGNOSIS — R06.81 WITNESSED EPISODE OF APNEA: Primary | ICD-10-CM

## 2022-01-18 DIAGNOSIS — R06.83 SNORING: ICD-10-CM

## 2022-01-18 DIAGNOSIS — R51.9 SLEEP RELATED HEADACHES: ICD-10-CM

## 2022-01-18 DIAGNOSIS — G47.10 HYPERSOMNIA: ICD-10-CM

## 2022-01-18 PROCEDURE — 99204 OFFICE O/P NEW MOD 45 MIN: CPT | Mod: GT | Performed by: INTERNAL MEDICINE

## 2022-01-18 ASSESSMENT — MIFFLIN-ST. JEOR: SCORE: 2058.42

## 2022-01-18 NOTE — PATIENT INSTRUCTIONS
Your BMI is Body mass index is 46.99 kg/m .    What is BMI?  Body mass index (BMI) is one way to tell whether you are at a healthy weight, overweight, or obese. It measures your weight in relation to your height.  A BMI of 18.5 to 24.9 is in the healthy range. A person with a BMI of 25 to 29.9 is considered overweight, and someone with a BMI of 30 or greater is considered obese.  Another way to find out if you are at risk for health problems caused by overweight and obesity is to measure your waist. If you are a woman and your waist is more than 35 inches, or if you are a man and your waist is more than 40 inches, your risk of disease may be higher.  More than two-thirds of American adults are considered overweight or obese. Being overweight or obese increases the risk for further weight gain.  Excess weight may lead to heart disease and diabetes. Creating and following plans for healthy eating and physical activity may help you improve your health.    Methods for maintaining or losing weight.  Weight control is part of healthy lifestyle and includes exercise, emotional health, and healthy eating habits.  Careful eating habits lifelong is the mainstay of weight control.  Though there are significant health benefits from weight loss, long-term weight loss with diet alone may be very difficult to achieve- studies show long-term success with dietary management in less than 10% of people. Attaining a healthy weight may be especially difficult to achieve in those with severe obesity. In some cases, medications, devices and surgical management might be considered.    What can you do?  If you are overweight or obese and are interested in methods for weight loss, you should discuss this with your provider. In addition, we recommend that you review healthy life styles and methods for weight loss available through the National Institutes of Health patient information sites:    http://win.niddk.nih.gov/publications/index.htm    What is a Home Sleep Study?    your doctor can give you a portable sleep monitor to use at home, so you don t have to spend the night in the sleep lab. But you should use a portable monitor only if:   ?Your doctor thinks you have a condition that makes you stop breathing for short periods while you are asleep, called  sleep apnea.    ?You do not have other serious medical problems, such as heart disease or lung disease.    Please bring the home sleep study device back to the sleep center as soon as you are finished with it so we can score it.     The cost of care estimate line is [phone]. They are able to give the patient an estimate of the charges and also an estimate of their insurance coverage/patient responsibility.   After your sleep study is performed, please call us at [phone] or [phone] to schedule for a follow up to review the results of the sleep study.    Consider using one tab of low dose melatonin 3 mg or less on the night of the study.    It is completely voluntary.    Do not drive or operate machinery after intake of melatonin.     Due to the pandemic, we have many people waiting in line for sleep studies- this wait list is improving each week.   You should receive a call within 2 weeks from our staff to schedule you for  your test.   Depending on the delay in approval by your insurance carrier, the study will be completed within a few days to 2 weeks of that call.

## 2022-01-18 NOTE — PROGRESS NOTES
Karolina is a 42 year old who is being evaluated via a billable video visit.      How would you like to obtain your AVS? MyChart  If the video visit is dropped, the invitation should be resent by: Send to e-mail at: [email or web address]  Will anyone else be joining your video visit? No    Video Start Time: 8:30 AM  Video-Visit Details    Type of service:  Video Visit    Video End Time:8:52 AM    Originating Location (pt. Location): Home    Distant Location (provider location):  [facility]     Platform used for Video Visit: SweetPerk     Answers for HPI/ROS submitted by the patient on 1/12/2022  General Symptoms: No  Skin Symptoms: No  HENT Symptoms: No  EYE SYMPTOMS: No  HEART SYMPTOMS: No  LUNG SYMPTOMS: No  INTESTINAL SYMPTOMS: No  URINARY SYMPTOMS: No  GYNECOLOGIC SYMPTOMS: No  BREAST SYMPTOMS: No  SKELETAL SYMPTOMS: No  BLOOD SYMPTOMS: No  NERVOUS SYSTEM SYMPTOMS: No  MENTAL HEALTH SYMPTOMS: No    Additional 15 minutes on the date of service was spent performing the following:    -Preparing to see the patient  -Obtaining and/or reviewing separately obtained history   -Ordering medications, tests, or procedures   -Documenting clinical information in the electronic or other health record     Thank you for the opportunity to participate in the care of  Karolina Herrera.    Assessment and Plan:    In summary Karolina Herrera is a 42 year old year old female here for sleep disturbance.  1. Witness apnea/Hypersomnia/Snoring/Sleep related headaches/Morbid Obesity   Karolina Herrera has high risk for obstructive sleep apnea based on the history of witness apnea, hypersomnia, snoring and a crowded airway. I educated the patient on the underlying pathophysiology of obstructive sleep apnea. We reviewed the risks associated with sleep apnea, including increased cardiovascular risk and overall death. We talked about treatments briefly. I recommend getting a Home sleep study. The patient  should return to the clinic to discuss results and treatment option in a patient-centered approach.    History of present illness:    She is a 42 year old female who comes to the [facility] clinic with a chief complaint of snoring.  During a recent visit with her sister, she was told that she has significant pauses in her breathing during sleep followed by loud snoring.  The patient has been suffering from excessive daytime sleepiness for at least 2 years but she attributes this to combination of work and taking care of her children.  The patient does occasionally wake up with headaches.  Her BMI is 46.99.  She denies any episodes of limb movements or acting out in her dreams.     Ideal Sleep-Wake Cycle(devoid of societal pressure):    Patient would try to initiate sleep at around 10-11 PM with a sleep latency of 30 minutes. The patient would have 1-2 awakenings. Final wake up time is around 8-9 AM.    ALISA:  ALISA Total Score: 8  Total score - [place]: 10 (1/12/2022 10:28 AM)    Patient told to return in one week after the sleep study is interpreted.    Patient Active Problem List   Diagnosis     Family history of malignant neoplasm of breast     PCOS (polycystic ovarian syndrome)     Irritable bowel syndrome without diarrhea     BMI 40.0-44.9, adult (H)     ACP (advance care planning)     History of gestational diabetes     Vitamin D deficiency     Irritable bowel syndrome     Past Medical History:   Diagnosis Date     Abnormal vaginal bleeding 2008 and 2009     Amblyopia      At high risk for breast cancer      Encounter for gynecological examination without abnormal finding 10/19/2015    Overview:  Wife is Peyton.  Both are . Pap neg 6/10/ 2013: record in Care Everywhere from [place]      Family history of malignant neoplasm of breast      High risk pregnancy, antepartum 3/30/2018    Overview:  Prenatal provider & planned delivery site[place] [facility] Wife is Peyton.   IVF through [place] with donor from sperm  bank.  Same donor as first pregnancy with their son Huy.  Labs and records requested from [facility].   Initial prenatal bloodwork ___  GC/CT ___- These labs were deferred to wait for records from [facility] [place].   Pregravid BMI >40.  Early GCT ____ UC done.  Pap up to date 7/17 Geneti     Low back pain 6/18/2014     Morbid obesity due to excess calories (H) 3/25/2016     Obesity with body mass index 30 or greater 12/31/2014     Past Surgical History:   Procedure Laterality Date     GYN SURGERY  07/2014    Polypectomy     IVS  2015    invitro fertilization     LAPAROSCOPIC CHOLECYSTECTOMY N/A 11/07/2016    Procedure: LAPAROSCOPIC CHOLECYSTECTOMY;  Surgeon: Juan F Sherman MD;  [facility]     wisdom teeth removed       Current Outpatient Medications   Medication Sig Dispense Refill     albuterol (PROAIR HFA/PROVENTIL HFA/VENTOLIN HFA) 108 (90 Base) MCG/ACT inhaler Inhale 2 puffs into the lungs every 4 hours as needed for shortness of breath / dyspnea, wheezing or other (cough) 1 Inhaler 0     Multiple Vitamins-Minerals (MULTIVITAMIN ADULT PO) Take 1 tablet by mouth       sertraline (ZOLOFT) 25 MG tablet        Patient has no known allergies.  Social History     Socioeconomic History     Marital status:      Spouse name: Samantha     Number of children: 2     Years of education: Not on file     Highest education level: Not on file   Occupational History     Comment: works for [employer]   Tobacco Use     Smoking status: Never Smoker     Smokeless tobacco: Never Used   Substance and Sexual Activity     Alcohol use: Yes     Comment: 1-2 per week.     Drug use: No     Sexual activity: Yes     Partners: Female     Birth control/protection: None   Other Topics Concern      Service No     Blood Transfusions No     Caffeine Concern No     Occupational Exposure No     Hobby Hazards No     Sleep Concern No     Stress Concern No     Weight Concern No     Special Diet No     Back Care No     Exercise Yes      Comment: walks dog 2-3 times/day; prenatal exercises     Bike Helmet Not Asked     Seat Belt Yes     Self-Exams Yes     Parent/sibling w/ CABG, MI or angioplasty before 65F 55M? No   Social History Narrative     Not on file     Social Determinants of Health     Financial Resource Strain: Not on file   Food Insecurity: Not on file   Transportation Needs: Not on file   Physical Activity: Not on file   Stress: Not on file   Social Connections: Not on file   Intimate Partner Violence: Not on file   Housing Stability: Not on file     Family History   Problem Relation Age of Onset     Skin Cancer Mother      Glaucoma Father      Esophageal Cancer Maternal Grandfather          in 60s; hx of smoking     Bladder Cancer Paternal Grandfather          in 80s     Breast Cancer Sister 39        invasive ductal carcinoma, treated with lumpectomy, radiation and tamoxifen     Breast Cancer Paternal Aunt         paternal great aunt in 70s     Breast Cancer Sister         Age 39 at diagnosis     Macular Degeneration No family hx of         Physical Exam:  GEN: NAD,   Head: Normocephalic.  EYES: EOMI  ENT: Oropharynx is clear, Mullen class 4+ airway.   Psych: normal mood, normal affect  Snore test:(-)     Labs/Studies:     No results found for: PH, PHARTERIAL, PO2, VJ3TETLYARL, SAT, PCO2, HCO3, BASEEXCESS, MUNIRA, BEB  Lab Results   Component Value Date    TSH 1.87 10/28/2017    TSH 1.90 2016     Lab Results   Component Value Date     (H) 2016     Lab Results   Component Value Date    HGB 13.6 11/15/2019    HGB 13.6 2016     Lab Results   Component Value Date    BUN 15 2016    CR 0.66 11/10/2020    CR 0.72 2016     Lab Results   Component Value Date    AST 18 2016    ALT 32 2016    ALKPHOS 76 2016    BILITOTAL 0.4 2016     No results found for: UAMP, UBARB, BENZODIAZEUR, UCANN, UCOC, OPIT, UPCP    Recent Labs   Lab Test 11/10/20  0822 16  0622   NA  --  140    POTASSIUM  --  4.1   CHLORIDE  --  110*   CO2  --  21   ANIONGAP  --  9   GLC  --  122*   BUN  --  15   CR 0.66 0.72   PAMELA  --  8.2*       No results found for: FAUSTINA Chandra DO  Board Certified in Internal Medicine and Sleep Medicine    (Note created with Dragon voice recognition and unintended spelling errors and word substitutions may occur)

## 2022-02-03 ENCOUNTER — TELEPHONE (OUTPATIENT)
Dept: SLEEP MEDICINE | Facility: CLINIC | Age: 42
End: 2022-02-03
Payer: COMMERCIAL

## 2022-02-03 NOTE — TELEPHONE ENCOUNTER
----- Message from Gila Sierra sent at 1/19/2022  9:59 AM CST -----  Regarding: WatchPat  F/U with provider

## 2022-02-14 ENCOUNTER — TELEPHONE (OUTPATIENT)
Dept: SLEEP MEDICINE | Facility: CLINIC | Age: 42
End: 2022-02-14
Payer: COMMERCIAL

## 2022-02-25 ENCOUNTER — VIRTUAL VISIT (OUTPATIENT)
Dept: SLEEP MEDICINE | Facility: CLINIC | Age: 42
End: 2022-02-25
Attending: INTERNAL MEDICINE
Payer: COMMERCIAL

## 2022-02-25 DIAGNOSIS — R06.81 WITNESSED EPISODE OF APNEA: ICD-10-CM

## 2022-02-25 DIAGNOSIS — R06.83 SNORING: ICD-10-CM

## 2022-02-25 DIAGNOSIS — R51.9 SLEEP RELATED HEADACHES: ICD-10-CM

## 2022-02-25 DIAGNOSIS — G47.10 HYPERSOMNIA: ICD-10-CM

## 2022-02-25 PROCEDURE — 95800 SLP STDY UNATTENDED: CPT | Performed by: INTERNAL MEDICINE

## 2022-02-25 NOTE — PROGRESS NOTES
Device has been registered and shipped via [employer] on 2/25/22. Patient was notified that package was mailed out. Watch [facility] serial number [id number].

## 2022-03-07 ENCOUNTER — TELEPHONE (OUTPATIENT)
Dept: SLEEP MEDICINE | Facility: CLINIC | Age: 42
End: 2022-03-07
Payer: COMMERCIAL

## 2022-03-07 NOTE — PROCEDURES
"WatchPAT - HOME SLEEP STUDY INTERPRETATION    Patient: Karolina Herrera  MRN: [id number]  YOB: 1980  Study Date: 03/05/22  Referring Provider: Kim Urbina; CNP  Ordering Provider: Yossi Chandra DO     Chain of custody patient verification was not enabled.  Chain of custody verification was not present throughout the entire study.     Indications for Home Study: Karolina Herrera is a 42 year old female who presents with symptoms suggestive of obstructive sleep apnea.    Estimated body mass index is 46.99 kg/m  as calculated from the following:    Height as of 1/18/22: 1.702 m (5' 7\").    Weight as of 1/18/22: 136.1 kg (300 lb).  Total score - [place]: 10 (1/12/2022 10:28 AM)    Data: A full night home sleep study was performed recording the standard physiologic parameters including peripheral arterial tonometry (PAT), sound/snoring, body position,  movement, sound, and oxygen saturation by pulse oximetry. Pulse rate was estimated by oximetry recording. Sleep staging (wake, REM, light, and deep sleep) was derived from PAT signal.  This study was considered adequate based on > 4 hours of quality oximetry and respiratory recording. As specified by the AASM Manual for the Scoring of Sleep and Associated events, version 2.3, Rule VIII.D 1B, 4% oxygen desaturation scoring for hypopneas is used as a standard of care on all home sleep apnea testing.    Total Recording Time: 7 hrs, 54 min  Total Sleep Time: 7 hrs, 9 min  % of Sleep Time REM: 21.5%    Respiratory:  Snoring: Snoring was present.  Respiratory events: The PAT respiratory disturbance index [pRDI] was 52.2 events per hour.  The PAT apnea/hypopnea index [pAHI] was 51.9 events per hour.  JOSSY was 45.2 events per hour.  During REM sleep the pAHI was 61.9.  Sleep Associated Hypoxemia: sustained hypoxemia was present. Mean oxygen saturation was 92%.  Minimum was 82%.  Time with saturation less than 88% was 25.9 minutes.    Heart Rate: By pulse " oximetry normal rate was noted.     Position: Percent of time spent: supine - 31.7%, prone - 22.9%, on right - 39.5%, on left - 5.8%.  pAHI was 108.8 per hour supine, 24.9 per hour prone, 22.6 per hour on right side, and 51.3 per hour on left side.     Assessment:   Severe obstructive sleep apnea.  Sleep associated hypoxemia was present.    Recommendations:  Consider auto-CPAP at 5-20 cmH2O or polysomnography with full night PAP titration.  Suggest optimizing sleep hygiene and avoiding sleep deprivation.  Weight management.    Diagnosis Code(s): Obstructive Sleep Apnea G47.33, Hypoxemia G47.36    Yossi Chandra DO, March 7, 2022   Diplomate, American Board of Internal Medicine, Sleep Medicine

## 2022-03-07 NOTE — TELEPHONE ENCOUNTER
Enochtcjennifer. Please call the patient to schedule for an earlier follow-up visit to review the results of the sleep study.  Please schedule patient to follow-up with me next week. May book on my lunch hour except for Wednesdays and Fridays.Thank you.

## 2022-03-07 NOTE — PROGRESS NOTES
Watch pat has been scored using rule 1B, 4%.   Patient to follow up with provider to determine appropriate therapy.     Pat AHI: 51.9    Ordering Provider: Dr. Prateek Hernandez, [facility], [facility]  Sleep Technologist  3/07/22

## 2022-03-10 NOTE — PROGRESS NOTES
Oncology Risk Management Consultation:  Date on this visit: 3/11/2022    Karolina Herrera requires heightened screening and surveillance for her higher risk of breast cancer, secondary to a  family history of invasive ductal carcinoma of the breast in her sister at age 39.  She is considered to be at high risk for breast cancer and has a 24.4% lifetime risk for breast cancer by the TIEN model. Her risk within the next 5 years is 1.4% by TIEN.     Primary Physician:  MIKEL Lopez-SHANNAN     History Of Present Illness:  Ms. Herrera is a very pleasant, healthy  42 year old female who presents with a family history of breast cancer.      Genetic testing:  No genetic testing to date. Her sister, who had invasive ductal carcinoma at age 39, by report, was negative for genetic mutations in the BRCA1 and BRCA2 genes and Karolina has not had genetic testing.      Pertinent health history:    Menarche at 12.  First child at age 35, conceived using one cycle of IVF.   Premenopausal.  Ovaries and uterus intact.   OCP use for one year.  Polycystic ovarian syndrome  Scattered fibroglandular densities.  Hx of mastitis in  with breastfeeding, resolved with antibiotics.  Hx of right breast pain with lactation. No infection 2019; treated with tylenol and ibuprofen. Suspected clogged milk duct.  History of breastfeeding x 13 months for Huy and 4-5 months for Celia   No history of breast biopsy.  No history of hyperplasia, atypia or malignancy     Pertinent Screening History:  2016 - Screening mammogram, BiRads1  2017 - Breast MRI, BiRads1  2017 - Screening mammogram, BiRads1  2019 - Breast MRI, BiRads1  2020- Screening tomosynthesis mammogram, BiRads1  2021- Screening tomosynthesis mammogram, BiRads1  2021- Breast MRI,  BiRads1     At this visit, she denies asymmetry, lumps, masses, thickening, nipple discharge and skin changes.  She notes intermittent left breast pain, which self  resolves without intervention.  She describes it as primarily on the lateral side but also superior to the nipple; notes that it sometimes is tender to the touch, without erythema, open wound or tingling.    Past Medical/Surgical History:  Past Medical History:   Diagnosis Date     Abnormal vaginal bleeding 2008 and 2009     Amblyopia      At high risk for breast cancer      Encounter for gynecological examination without abnormal finding 10/19/2015    Overview:  Wife is Peyton.  Both are . Pap neg 6/10/ 2013: record in Care Everywhere from [facility]      Family history of malignant neoplasm of breast      High risk pregnancy, antepartum 3/30/2018    Overview:  [place]renatal provider & planned [place] [facility] Wife is Peyton.   IVF through [facility] with donor from sperm bank.  Same donor as first pregnancy with their son Huy.  Labs and records requested from [facility].   Initial prenatal bloodwork ___  GC/CT ___- These labs were deferred to wait for records from [facility].   Pregravid BMI >40.  Early GCT ____ UC done.  Pap up to date 7/17 Geneti     Low back pain 6/18/2014     Morbid obesity due to excess calories (H) 3/25/2016     Obesity with body mass index 30 or greater 12/31/2014     Past Surgical History:   Procedure Laterality Date     GYN SURGERY  07/2014    Polypectomy     IVS  2015    invitro fertilization     LAPAROSCOPIC CHOLECYSTECTOMY N/A 11/07/2016    Procedure: LAPAROSCOPIC CHOLECYSTECTOMY;  Surgeon: Juan F Sherman MD;  [facility]     wisdom teeth removed       [facility] SLEEP STUDY, UNATTENDED, RECORD HEART RATE/O2 SAT/RESP ANAL/SLEEP TM  3/7/2022            Allergies:  Allergies as of 03/11/2022     (No Known Allergies)       Current Medications:  Current Outpatient Medications   Medication Sig Dispense Refill     albuterol (PROAIR HFA/PROVENTIL HFA/VENTOLIN HFA) 108 (90 Base) MCG/ACT inhaler Inhale 2 puffs into the lungs every 4 hours as needed for shortness of breath / dyspnea, wheezing or  other (cough) 1 Inhaler 0     Multiple Vitamins-Minerals (MULTIVITAMIN ADULT PO) Take 1 tablet by mouth       sertraline (ZOLOFT) 25 MG tablet           Family History:  Family History   Problem Relation Age of Onset     Skin Cancer Mother      Glaucoma Father      Esophageal Cancer Maternal Grandfather          in 60s; hx of smoking     Bladder Cancer Paternal Grandfather          in 80s     Breast Cancer Sister 39        invasive ductal carcinoma, treated with lumpectomy, radiation and tamoxifen     Breast Cancer Paternal Aunt         paternal great aunt in 70s     Breast Cancer Sister         Age 39 at diagnosis     Macular Degeneration No family hx of        Social History:  Social History     Socioeconomic History     Marital status:      Spouse name: Samantha     Number of children: 2     Years of education: Not on file     Highest education level: Not on file   Occupational History     Comment: works for [employer]   Tobacco Use     Smoking status: Never Smoker     Smokeless tobacco: Never Used   Substance and Sexual Activity     Alcohol use: Yes     Comment: 1-2 per week.     Drug use: No     Sexual activity: Yes     Partners: Female     Birth control/protection: None   Other Topics Concern      Service No     Blood Transfusions No     Caffeine Concern No     Occupational Exposure No     Hobby Hazards No     Sleep Concern No     Stress Concern No     Weight Concern No     Special Diet No     Back Care No     Exercise Yes     Comment: walks dog 2-3 times/day; prenatal exercises     Bike Helmet Not Asked     Seat Belt Yes     Self-Exams Yes     Parent/sibling w/ CABG, MI or angioplasty before 65F 55M? No   Social History Narrative     Not on file     Social Determinants of Health     Financial Resource Strain: Not on file   Food Insecurity: Not on file   Transportation Needs: Not on file   Physical Activity: Not on file   Stress: Not on file   Social Connections: Not on file    Intimate Partner Violence: Not on file   Housing Stability: Not on file       Physical Exam:  There were no vitals taken for this visit.    GENERAL APPEARANCE: healthy, alert and no distress     RESP: lungs clear to auscultation - no rales, rhonchi or wheezes    BREAST: A multipositional, bilateral breast exam was performed.  Symmetrical pendulous breasts. Right breast: no palpable dominant masses, no nipple discharge, no skin changes. Dense tissue.  Right axilla: no palpable adenopathy. Left breast: no palpable dominant masses, no nipple discharge, no skin changes. Left axilla: no palpable adenopathy. Dense tissue.    LYMPHATICS: No cervical, supraclavicular, axillary or inguinal lymphadenopathy     ABDOMEN:  soft, nontender, no masses and bowel sounds present in all 4 quadrants       SKIN: no suspicious lesions or rashes    Laboratory/Imaging Studies  No results found for any visits on 03/11/22.    ASSESSMENT  We discussed her last screening tests and reviewed results.  We discussed that breast pain has an unclear etiology  and that it can be difficult to treat. Its association with breast cancer is low. She does not seem to have an over consumption of caffeine, but her bra lines are evident and she may benefit from a different type of support. She is not highly concerned and there is no evidence of injury or inflammation.     At this point, her exam was unremarkable and she will proceed with the following plan.     Individualized Surveillance Plan for women  With 20% or greater lifetime risk of breast cancer   Per NCCN Breast Cancer Screening and Diagnosis Guidelines Version 1.2021   Recommended screening Test or procedure Last done Next Scheduled    Clinical encounter Clinical exam every 6-12 months.   Refer to genetic counseling if not already done.  Consider risk reduction strategies.   4/29/2022 June 2023   However, some family histories with breast cancers at a very young age, may warrant screening  starting earlier.    *May begin at age 40 if breast cancers in the family occur at later ages.    Annual mammogram beginning 10 years younger than the earliest breast cancer in the family but not prior to age 30.    Recommend annual breast MRI to begin 10 years younger than the earliest breast cancer in the family but not prior to age 25.    Breast MRIs are preferably done on day 7-15 of the menstrual cycle in premenopausal women. 11/29/2021- Breast MRI,  BiRads1 Mammogram today after visit    Breast MRI in December     Next exam: June 2023 with mammogram after the visit   Breast screening for patients at high risk due to thoracic radiation between the ages of 10-30   Annual clinical exam beginning 8 years after radiation therapy.    Annual screening mammogram beginning at age 30 or 8 years after radiation therapy    Annual breast MRI, beginning at age 25 or 8 years after radiation therapy.       NA     NA   Women who have a lifetime risk of >20% based on history of LCIS or ADH/ALH Annual screening mammogram beginning at age of LCIS or ADH/ALH but not prior to age 30.    Consider annual MRI to begin at age of diagnosis of LCIS or ADH/ALH but not prior to age 25.    Consider risk reducing strategies.     NA     NA    Recommend risk reducing strategies for women with 1.7% 5 year risk of breast cancer.     I spent a total of 27 minutes on the day of the visit. Please see the note for further information on patient assessment and treatment.    MIKEL Richey-CNS, OCN, AGN-BC  Clinical Nurse Specialist  Cancer Risk Management Program  [facility]      CC: MIKEL Maurer-CNP

## 2022-03-14 ENCOUNTER — OFFICE VISIT (OUTPATIENT)
Dept: DERMATOLOGY | Facility: CLINIC | Age: 42
End: 2022-03-14
Payer: COMMERCIAL

## 2022-03-14 DIAGNOSIS — L82.1 SEBORRHEIC KERATOSES: ICD-10-CM

## 2022-03-14 DIAGNOSIS — Z80.8 FAMILY HISTORY OF NONMELANOMA SKIN CANCER: ICD-10-CM

## 2022-03-14 DIAGNOSIS — D23.62 DERMATOFIBROMA OF LEFT SHOULDER: ICD-10-CM

## 2022-03-14 DIAGNOSIS — Z12.83 SKIN CANCER SCREENING: ICD-10-CM

## 2022-03-14 DIAGNOSIS — D22.9 MULTIPLE PIGMENTED NEVI: ICD-10-CM

## 2022-03-14 DIAGNOSIS — L81.4 SOLAR LENTIGINOSIS: Primary | ICD-10-CM

## 2022-03-14 DIAGNOSIS — D18.01 CHERRY ANGIOMA: ICD-10-CM

## 2022-03-14 PROCEDURE — 99213 OFFICE O/P EST LOW 20 MIN: CPT | Performed by: PHYSICIAN ASSISTANT

## 2022-03-14 ASSESSMENT — PAIN SCALES - GENERAL: PAINLEVEL: NO PAIN (0)

## 2022-03-14 NOTE — PROGRESS NOTES
[facility] Dermatology Note  Encounter Date: Mar 14, 2022  Office Visit     Dermatology Problem List:  1. Skin cancer screening 3/14/22  2. Hand eczema, emollients, gentle skin cares    ____________________________________________    Assessment & Plan:      # Multiple pigmented nevi and solar lentigines,  ABCDs of melanoma were discussed and self skin checks were advised.   Sun precaution was advised including the use of sun screens of SPF 30 or higher, sun protective clothing, and avoidance of tanning beds.    # benign growths, cherry angiomas and seborrheic keratoses and Dermatofibroma on the left posterior shoulder  -Reassurance given.      # Family hx of non melanoma skin cancer in mother,  Recommend annual skin checks     Procedures Performed:   None      Follow-up: 1 year(s) in-person, or earlier for new or changing lesions    Staff and Scribe:     Scribe Disclosure:  I, Gi Castano, am serving as a scribe to prep the notes for Milvia Rodriguez PA-C based.    Provider Disclosure:   The documentation recorded by the scribe accurately reflects the services I personally performed and the decisions made by me.    All risks, benefits and alternatives were discussed with patient.  Patient is in agreement and understands the assessment and plan.  All questions were answered.  Sun Screen Education was given.   Return to Clinic annually or sooner as needed.   Milvia Rodriguez PA-C   [facility]   ____________________________________________    CC: Skin Check (full body skin check. )    HPI:  Ms. Karolina Herrera is a(n) 42 year old female who presents today as a return patient for a skin cancer screening. Last seen in dermatology on 3/1/21 by myself at which point she had a benign skin exam.     She denies any spots that are painful, bleeding, itchy or changing. She has a family history of skin cancer in her mother.     Patient is otherwise feeling well,  without additional skin concerns.    Labs Reviewed:  N/A    Physical Exam:  Vitals: There were no vitals taken for this visit.  SKIN: Full skin, which includes the head/face, both arms, chest, back, abdomen,both legs, genitalia and/or groin buttocks, digits and/or nails, was examined.  - There are dome shaped bright red papules on the trunk.   There is a firm tan/flesh colored papule that dimples with lateral pressure on the left posterior shoulder.  Multiple regular brown pigmented macules and papules are identified on the trunk and extremities.   Scattered brown macules on sun exposed areas..   - There are waxy stuck on tan to brown papules on the trunk and extremities..   - No other lesions of concern on areas examined.     Medications:  Current Outpatient Medications   Medication     albuterol (PROAIR HFA/PROVENTIL HFA/VENTOLIN HFA) 108 (90 Base) MCG/ACT inhaler     Multiple Vitamins-Minerals (MULTIVITAMIN ADULT PO)     sertraline (ZOLOFT) 25 MG tablet     No current facility-administered medications for this visit.      Past Medical History:   Patient Active Problem List   Diagnosis     Family history of malignant neoplasm of breast     PCOS (polycystic ovarian syndrome)     Irritable bowel syndrome without diarrhea     BMI 40.0-44.9, adult (H)     ACP (advance care planning)     History of gestational diabetes     Vitamin D deficiency     Irritable bowel syndrome     Past Medical History:   Diagnosis Date     Abnormal vaginal bleeding 2008 and 2009     Amblyopia      At high risk for breast cancer      Encounter for gynecological examination without abnormal finding 10/19/2015    Overview:  Wife is Peyton.  Both are . Pap neg 6/10/ 2013: record in Care Everywhere from [facility]      Family history of malignant neoplasm of breast      High risk pregnancy, antepartum 3/30/2018    Overview:  [place] provider & [place] [facility] Wife is Peyton.   IVF through [facility] with donor from sperm  bank.  Same donor as first pregnancy with their son Huy.  Labs and records requested from [facility].   Initial prenatal bloodwork ___  GC/CT ___- These labs were deferred to wait for records from [facility].   Pregravid BMI >40.  Early GCT ____ UC done.  Pap up to date 7/17 Geneti     Low back pain 6/18/2014     Morbid obesity due to excess calories (H) 3/25/2016     Obesity with body mass index 30 or greater 12/31/2014        CC Kim Urbina, APRN CNP  [address]  [place] [address] on close of this encounter.

## 2022-03-14 NOTE — LETTER
3/14/2022       RE: Karolina Herrera  [address]  [facility] [address]     Dear Colleague,    Thank you for referring your patient, Karolina Herrera, to the [facility] [place] [facility]. Please see a copy of my visit note below.    [facility] Dermatology Note  Encounter Date: Mar 14, 2022  Office Visit     Dermatology Problem List:  1. Skin cancer screening 3/14/22  2. Hand eczema, emollients, gentle skin cares    ____________________________________________    Assessment & Plan:      # Multiple pigmented nevi and solar lentigines,  ABCDs of melanoma were discussed and self skin checks were advised.   Sun precaution was advised including the use of sun screens of SPF 30 or higher, sun protective clothing, and avoidance of tanning beds.    # benign growths, cherry angiomas and seborrheic keratoses and Dermatofibroma on the left posterior shoulder  -Reassurance given.      # Family hx of non melanoma skin cancer in mother,  Recommend annual skin checks     Procedures Performed:   None      Follow-up: 1 year(s) in-person, or earlier for new or changing lesions    Staff and Scribe:     Scribe Disclosure:  I, Gi Castano, am serving as a scribe to prep the notes for Milvia Rodriguez PA-C based.    Provider Disclosure:   The documentation recorded by the scribe accurately reflects the services I personally performed and the decisions made by me.    All risks, benefits and alternatives were discussed with patient.  Patient is in agreement and understands the assessment and plan.  All questions were answered.  Sun Screen Education was given.   Return to Clinic annually or sooner as needed.   Milvia Rodriguez PA-C   [facility]   ____________________________________________    CC: Skin Check (full body skin check. )    HPI:  Ms. Karolina Herrera is a(n) 42 year old female who  presents today as a return patient for a skin cancer screening. Last seen in dermatology on 3/1/21 by myself at which point she had a benign skin exam.     She denies any spots that are painful, bleeding, itchy or changing. She has a family history of skin cancer in her mother.     Patient is otherwise feeling well, without additional skin concerns.    Labs Reviewed:  N/A    Physical Exam:  Vitals: There were no vitals taken for this visit.  SKIN: Full skin, which includes the head/face, both arms, chest, back, abdomen,both legs, genitalia and/or groin buttocks, digits and/or nails, was examined.  - There are dome shaped bright red papules on the trunk.   There is a firm tan/flesh colored papule that dimples with lateral pressure on the left posterior shoulder.  Multiple regular brown pigmented macules and papules are identified on the trunk and extremities.   Scattered brown macules on sun exposed areas..   - There are waxy stuck on tan to brown papules on the trunk and extremities..   - No other lesions of concern on areas examined.     Medications:  Current Outpatient Medications   Medication     albuterol (PROAIR HFA/PROVENTIL HFA/VENTOLIN HFA) 108 (90 Base) MCG/ACT inhaler     Multiple Vitamins-Minerals (MULTIVITAMIN ADULT PO)     sertraline (ZOLOFT) 25 MG tablet     No current facility-administered medications for this visit.      Past Medical History:   Patient Active Problem List   Diagnosis     Family history of malignant neoplasm of breast     PCOS (polycystic ovarian syndrome)     Irritable bowel syndrome without diarrhea     BMI 40.0-44.9, adult (H)     ACP (advance care planning)     History of gestational diabetes     Vitamin D deficiency     Irritable bowel syndrome     Past Medical History:   Diagnosis Date     Abnormal vaginal bleeding 2008 and 2009     Amblyopia      At high risk for breast cancer      Encounter for gynecological examination without abnormal finding 10/19/2015    Overview:  Wife is  Peyton.  Both are . Pap neg 6/10/ 2013: record in Care Everywhere from [facility]      Family history of malignant neoplasm of breast      High risk pregnancy, antepartum 3/30/2018    Overview:  Prenatal provider & planned delivery site: [facility] [place] [facility] Wife is Peyton.   IVF through [facility] with donor from sperm bank.  Same donor as first pregnancy with their son Huy.  Labs and records requested from [facility].   Initial prenatal bloodwork ___  GC/CT ___- These labs were deferred to wait for records from [facility].   Pregravid BMI >40.  Early GCT ____ UC done.  Pap up to date 7/17 Geneti     Low back pain 6/18/2014     Morbid obesity due to excess calories (H) 3/25/2016     Obesity with body mass index 30 or greater 12/31/2014        CC Kim Urbina, APRN CNP  [address]  [place] [address] on close of this encounter.

## 2022-04-29 ENCOUNTER — ANCILLARY PROCEDURE (OUTPATIENT)
Dept: MAMMOGRAPHY | Facility: CLINIC | Age: 42
End: 2022-04-29
Attending: CLINICAL NURSE SPECIALIST
Payer: COMMERCIAL

## 2022-04-29 ENCOUNTER — ONCOLOGY VISIT (OUTPATIENT)
Dept: ONCOLOGY | Facility: CLINIC | Age: 42
End: 2022-04-29
Attending: CLINICAL NURSE SPECIALIST
Payer: COMMERCIAL

## 2022-04-29 VITALS
SYSTOLIC BLOOD PRESSURE: 109 MMHG | DIASTOLIC BLOOD PRESSURE: 78 MMHG | WEIGHT: 293 LBS | TEMPERATURE: 98.6 F | HEART RATE: 87 BPM | BODY MASS INDEX: 45.99 KG/M2 | RESPIRATION RATE: 18 BRPM | OXYGEN SATURATION: 96 % | HEIGHT: 67 IN

## 2022-04-29 DIAGNOSIS — Z80.3 FAMILY HISTORY OF MALIGNANT NEOPLASM OF BREAST: ICD-10-CM

## 2022-04-29 DIAGNOSIS — Z12.39 BREAST CANCER SCREENING, HIGH RISK PATIENT: Primary | ICD-10-CM

## 2022-04-29 DIAGNOSIS — Z12.39 BREAST CANCER SCREENING, HIGH RISK PATIENT: ICD-10-CM

## 2022-04-29 PROCEDURE — G0463 HOSPITAL OUTPT CLINIC VISIT: HCPCS

## 2022-04-29 PROCEDURE — 99213 OFFICE O/P EST LOW 20 MIN: CPT | Performed by: CLINICAL NURSE SPECIALIST

## 2022-04-29 PROCEDURE — 77067 SCR MAMMO BI INCL CAD: CPT | Mod: GC

## 2022-04-29 PROCEDURE — 77063 BREAST TOMOSYNTHESIS BI: CPT | Mod: GC

## 2022-04-29 ASSESSMENT — PAIN SCALES - GENERAL: PAINLEVEL: NO PAIN (0)

## 2022-04-29 NOTE — LETTER
2022         RE: Karolina Herrera  [address]  [facility] [address]        Dear Colleague,    Thank you for referring your patient, Karolina Herrera, to the [facility]. Please see a copy of my visit note below.    Oncology Risk Management Consultation:  Date on this visit: 3/11/2022    Karolina Herrera requires heightened screening and surveillance for her higher risk of breast cancer, secondary to a  family history of invasive ductal carcinoma of the breast in her sister at age 39.  She is considered to be at high risk for breast cancer and has a 24.4% lifetime risk for breast cancer by the TIEN model. Her risk within the next 5 years is 1.4% by TIEN.     Primary Physician:  MIKEL Lopez-SHANNAN     History Of Present Illness:  Ms. Herrera is a very pleasant, healthy  42 year old female who presents with a family history of breast cancer.      Genetic testing:  No genetic testing to date. Her sister, who had invasive ductal carcinoma at age 39, by report, was negative for genetic mutations in the BRCA1 and BRCA2 genes and Karolina has not had genetic testing.      Pertinent health history:    Menarche at 12.  First child at age 35, conceived using one cycle of IVF.   Premenopausal.  Ovaries and uterus intact.   OCP use for one year.  Polycystic ovarian syndrome  Scattered fibroglandular densities.  Hx of mastitis in  with breastfeeding, resolved with antibiotics.  Hx of right breast pain with lactation. No infection 2019; treated with tylenol and ibuprofen. Suspected clogged milk duct.  History of breastfeeding x 13 months for Huy and 4-5 months for Celia   No history of breast biopsy.  No history of hyperplasia, atypia or malignancy     Pertinent Screening History:  2016 - Screening mammogram, BiRads1  2017 - Breast MRI, BiRads1  2017 - Screening mammogram, BiRads1  2019 - Breast MRI, BiRads1  2020- Screening tomosynthesis  mammogram, BiRads1  4/23/2021- Screening tomosynthesis mammogram, BiRads1  11/29/2021- Breast MRI,  BiRads1     At this visit, she denies asymmetry, lumps, masses, thickening, nipple discharge and skin changes.  She notes intermittent left breast pain, which self resolves without intervention.  She describes it as primarily on the lateral side but also superior to the nipple; notes that it sometimes is tender to the touch, without erythema, open wound or tingling.    Past Medical/Surgical History:  Past Medical History:   Diagnosis Date     Abnormal vaginal bleeding 2008 and 2009     Amblyopia      At high risk for breast cancer      Encounter for gynecological examination without abnormal finding 10/19/2015    Overview:  Wife is Peyton.  Both are . Pap neg 6/10/ 2013: record in Care Everywhere from [facility]      Family history of malignant neoplasm of breast      High risk pregnancy, antepartum 3/30/2018    Overview:  Prenatal provider & planned delivery site: [facility] Wife is Peyton.   IVF through [facility] with donor from sperm bank.  Same donor as first pregnancy with their son Huy.  Labs and records requested from [facility].   Initial prenatal bloodwork ___  GC/CT ___- These labs were deferred to wait for records from [facility].   Pregravid BMI >40.  Early GCT ____ UC done.  Pap up to date 7/17 Geneti     Low back pain 6/18/2014     Morbid obesity due to excess calories (H) 3/25/2016     Obesity with body mass index 30 or greater 12/31/2014     Past Surgical History:   Procedure Laterality Date     GYN SURGERY  07/2014    Polypectomy     IVS  2015    invitro fertilization     LAPAROSCOPIC CHOLECYSTECTOMY N/A 11/07/2016    Procedure: LAPAROSCOPIC CHOLECYSTECTOMY;  Surgeon: Juan F Sherman MD;  [facility]     wisdom teeth removed       [facility] SLEEP STUDY, UNATTENDED, RECORD HEART RATE/O2 SAT/RESP ANAL/SLEEP TM  3/7/2022            Allergies:  Allergies as of 03/11/2022     (No Known Allergies)        Current Medications:  Current Outpatient Medications   Medication Sig Dispense Refill     albuterol (PROAIR HFA/PROVENTIL HFA/VENTOLIN HFA) 108 (90 Base) MCG/ACT inhaler Inhale 2 puffs into the lungs every 4 hours as needed for shortness of breath / dyspnea, wheezing or other (cough) 1 Inhaler 0     Multiple Vitamins-Minerals (MULTIVITAMIN ADULT PO) Take 1 tablet by mouth       sertraline (ZOLOFT) 25 MG tablet           Family History:  Family History   Problem Relation Age of Onset     Skin Cancer Mother      Glaucoma Father      Esophageal Cancer Maternal Grandfather          in 60s; hx of smoking     Bladder Cancer Paternal Grandfather          in 80s     Breast Cancer Sister 39        invasive ductal carcinoma, treated with lumpectomy, radiation and tamoxifen     Breast Cancer Paternal Aunt         paternal great aunt in 70s     Breast Cancer Sister         Age 39 at diagnosis     Macular Degeneration No family hx of        Social History:  Social History     Socioeconomic History     Marital status:      Spouse name: Samantha     Number of children: 2     Years of education: Not on file     Highest education level: Not on file   Occupational History     Comment: works for [employer]   Tobacco Use     Smoking status: Never Smoker     Smokeless tobacco: Never Used   Substance and Sexual Activity     Alcohol use: Yes     Comment: 1-2 per week.     Drug use: No     Sexual activity: Yes     Partners: Female     Birth control/protection: None   Other Topics Concern      Service No     Blood Transfusions No     Caffeine Concern No     Occupational Exposure No     Hobby Hazards No     Sleep Concern No     Stress Concern No     Weight Concern No     Special Diet No     Back Care No     Exercise Yes     Comment: walks dog 2-3 times/day; prenatal exercises     Bike Helmet Not Asked     Seat Belt Yes     Self-Exams Yes     Parent/sibling w/ CABG, MI or angioplasty before 65F 55M? No    Social History Narrative     Not on file     Social Determinants of Health     Financial Resource Strain: Not on file   Food Insecurity: Not on file   Transportation Needs: Not on file   Physical Activity: Not on file   Stress: Not on file   Social Connections: Not on file   Intimate Partner Violence: Not on file   Housing Stability: Not on file       Physical Exam:  There were no vitals taken for this visit.    GENERAL APPEARANCE: healthy, alert and no distress     RESP: lungs clear to auscultation - no rales, rhonchi or wheezes    BREAST: A multipositional, bilateral breast exam was performed.  Symmetrical pendulous breasts. Right breast: no palpable dominant masses, no nipple discharge, no skin changes. Dense tissue.  Right axilla: no palpable adenopathy. Left breast: no palpable dominant masses, no nipple discharge, no skin changes. Left axilla: no palpable adenopathy. Dense tissue.    LYMPHATICS: No cervical, supraclavicular, axillary or inguinal lymphadenopathy     ABDOMEN:  soft, nontender, no masses and bowel sounds present in all 4 quadrants       SKIN: no suspicious lesions or rashes    Laboratory/Imaging Studies  No results found for any visits on 03/11/22.    ASSESSMENT  We discussed her last screening tests and reviewed results.  We discussed that breast pain has an unclear etiology  and that it can be difficult to treat. Its association with breast cancer is low. She does not seem to have an over consumption of caffeine, but her bra lines are evident and she may benefit from a different type of support. She is not highly concerned and there is no evidence of injury or inflammation.     At this point, her exam was unremarkable and she will proceed with the following plan.     Individualized Surveillance Plan for women  With 20% or greater lifetime risk of breast cancer   Per NCCN Breast Cancer Screening and Diagnosis Guidelines Version 1.2021   Recommended screening Test or procedure Last done Next  Scheduled    Clinical encounter Clinical exam every 6-12 months.   Refer to genetic counseling if not already done.  Consider risk reduction strategies.   4/29/2022 June 2023   However, some family histories with breast cancers at a very young age, may warrant screening starting earlier.    *May begin at age 40 if breast cancers in the family occur at later ages.    Annual mammogram beginning 10 years younger than the earliest breast cancer in the family but not prior to age 30.    Recommend annual breast MRI to begin 10 years younger than the earliest breast cancer in the family but not prior to age 25.    Breast MRIs are preferably done on day 7-15 of the menstrual cycle in premenopausal women. 11/29/2021- Breast MRI,  BiRads1 Mammogram today after visit    Breast MRI in December     Next exam: June 2023 with mammogram after the visit   Breast screening for patients at high risk due to thoracic radiation between the ages of 10-30   Annual clinical exam beginning 8 years after radiation therapy.    Annual screening mammogram beginning at age 30 or 8 years after radiation therapy    Annual breast MRI, beginning at age 25 or 8 years after radiation therapy.       NA     NA   Women who have a lifetime risk of >20% based on history of LCIS or ADH/ALH Annual screening mammogram beginning at age of LCIS or ADH/ALH but not prior to age 30.    Consider annual MRI to begin at age of diagnosis of LCIS or ADH/ALH but not prior to age 25.    Consider risk reducing strategies.     NA     NA    Recommend risk reducing strategies for women with 1.7% 5 year risk of breast cancer.     I spent a total of 27 minutes on the day of the visit. Please see the note for further information on patient assessment and treatment.    MIKEL Richey-CNS, OCN, AGN-BC  Clinical Nurse Specialist  Cancer Risk Management Program  [facility]      CC: Kim Urbina, MIKEL-CNP

## 2022-04-29 NOTE — PATIENT INSTRUCTIONS
Individualized Surveillance Plan for women  With 20% or greater lifetime risk of breast cancer   Per NCCN Breast Cancer Screening and Diagnosis Guidelines Version 1.2021   Recommended screening Test or procedure Last done Next Scheduled    Clinical encounter Clinical exam every 6-12 months.   Refer to genetic counseling if not already done.  Consider risk reduction strategies.   4/29/2022 June 2023   However, some family histories with breast cancers at a very young age, may warrant screening starting earlier.    *May begin at age 40 if breast cancers in the family occur at later ages.    Annual mammogram beginning 10 years younger than the earliest breast cancer in the family but not prior to age 30.    Recommend annual breast MRI to begin 10 years younger than the earliest breast cancer in the family but not prior to age 25.    Breast MRIs are preferably done on day 7-15 of the menstrual cycle in premenopausal women. 11/29/2021- Breast MRI,  BiRads1 Mammogram today after visit    Breast MRI in December     Next exam: June 2023 with mammogram after the visit   Breast screening for patients at high risk due to thoracic radiation between the ages of 10-30   Annual clinical exam beginning 8 years after radiation therapy.    Annual screening mammogram beginning at age 30 or 8 years after radiation therapy    Annual breast MRI, beginning at age 25 or 8 years after radiation therapy.       NA     NA   Women who have a lifetime risk of >20% based on history of LCIS or ADH/ALH Annual screening mammogram beginning at age of LCIS or ADH/ALH but not prior to age 30.    Consider annual MRI to begin at age of diagnosis of LCIS or ADH/ALH but not prior to age 25.    Consider risk reducing strategies.     NA     NA    Recommend risk reducing strategies for women with 1.7% 5 year risk of breast cancer.

## 2022-04-29 NOTE — NURSING NOTE
"Oncology Rooming Note    April 29, 2022 2:21 PM   Karolina Herrera is a 42 year old female who presents for:    Chief Complaint   Patient presents with     Oncology Clinic Visit     Breast cancer screening, high risk patient (Primary Dx); Family history of malignant neoplasm of breast      Initial Vitals: /78   Pulse 87   Temp 98.6  F (37  C) (Tympanic)   Resp 18   Ht 1.702 m (5' 7\")   Wt 138.2 kg (304 lb 9.6 oz)   SpO2 96%   BMI 47.71 kg/m   Estimated body mass index is 47.71 kg/m  as calculated from the following:    Height as of this encounter: 1.702 m (5' 7\").    Weight as of this encounter: 138.2 kg (304 lb 9.6 oz). Body surface area is 2.56 meters squared.  No Pain (0) Comment: Data Unavailable   No LMP recorded. (Menstrual status: Breast Feeding).  Allergies reviewed: Yes  Medications reviewed: Yes    Medications: Medication refills not needed today.  Pharmacy name entered into [facility] [facility]:    [id number] [place] [facility] - [place], [place] - [address] [id number] - [place], [place] - [address] AT [address]    Clinical concerns: NONE,      Kassi Christine CMA              "

## 2022-05-06 ASSESSMENT — SLEEP AND FATIGUE QUESTIONNAIRES
HOW LIKELY ARE YOU TO NOD OFF OR FALL ASLEEP WHILE SITTING AND TALKING TO SOMEONE: WOULD NEVER DOZE
HOW LIKELY ARE YOU TO NOD OFF OR FALL ASLEEP WHILE SITTING INACTIVE IN A PUBLIC PLACE: WOULD NEVER DOZE
HOW LIKELY ARE YOU TO NOD OFF OR FALL ASLEEP WHILE LYING DOWN TO REST IN THE AFTERNOON WHEN CIRCUMSTANCES PERMIT: MODERATE CHANCE OF DOZING
HOW LIKELY ARE YOU TO NOD OFF OR FALL ASLEEP WHILE SITTING QUIETLY AFTER LUNCH WITHOUT ALCOHOL: WOULD NEVER DOZE
HOW LIKELY ARE YOU TO NOD OFF OR FALL ASLEEP WHILE SITTING AND READING: SLIGHT CHANCE OF DOZING
HOW LIKELY ARE YOU TO NOD OFF OR FALL ASLEEP IN A CAR, WHILE STOPPED FOR A FEW MINUTES IN TRAFFIC: WOULD NEVER DOZE
HOW LIKELY ARE YOU TO NOD OFF OR FALL ASLEEP WHEN YOU ARE A PASSENGER IN A CAR FOR AN HOUR WITHOUT A BREAK: SLIGHT CHANCE OF DOZING
HOW LIKELY ARE YOU TO NOD OFF OR FALL ASLEEP WHILE WATCHING TV: WOULD NEVER DOZE

## 2022-05-09 ENCOUNTER — VIRTUAL VISIT (OUTPATIENT)
Dept: SLEEP MEDICINE | Facility: CLINIC | Age: 42
End: 2022-05-09
Payer: COMMERCIAL

## 2022-05-09 VITALS — HEIGHT: 67 IN | BODY MASS INDEX: 45.99 KG/M2 | WEIGHT: 293 LBS

## 2022-05-09 DIAGNOSIS — G47.34 SLEEP RELATED HYPOXIA: ICD-10-CM

## 2022-05-09 DIAGNOSIS — G47.33 OBSTRUCTIVE SLEEP APNEA: Primary | ICD-10-CM

## 2022-05-09 DIAGNOSIS — G47.10 HYPERSOMNIA: ICD-10-CM

## 2022-05-09 PROCEDURE — 99213 OFFICE O/P EST LOW 20 MIN: CPT | Mod: GT | Performed by: INTERNAL MEDICINE

## 2022-05-09 NOTE — PATIENT INSTRUCTIONS
"     What is sleep apnea?     Sleep apnea is a condition that makes you stop breathing for short periods while you are asleep. There are 2 types of sleep apnea. One is called \"obstructive sleep apnea,\" and the other is called \"central sleep apnea.\"  In obstructive sleep apnea, you stop breathing because your throat narrows or closes (figure 1). In central sleep apnea, you stop breathing because your brain does not send the right signals to your muscles to make you breathe. When people talk about sleep apnea, they are usually referring to obstructive sleep apnea, which is what this article is about.  People with sleep apnea do not know that they stop breathing when they are asleep. But they do sometimes wake up startled or gasping for breath. They also often hear from loved ones that they snore.  What are the symptoms of sleep apnea? -- The main symptoms of sleep apnea are loud snoring, tiredness, and daytime sleepiness. Other symptoms can include:  ?Restless sleep  ?Waking up choking or gasping  ?Morning headaches, dry mouth, or sore throat  ?Waking up often to urinate  ?Waking up feeling unrested or groggy  ?Trouble thinking clearly or remembering things  Some people with sleep apnea don't have symptoms, or they don't know they have them. They might figure that it's normal to be tired or to snore a lot.  Should I see a doctor or nurse? -- Yes. If you think you might have sleep apnea, see your doctor.  Is there a test for sleep apnea? -- Yes. If your doctor or nurse suspects you have sleep apnea, he or she might send you for a \"sleep study.\" Sleep studies can sometimes be done at home, but they are usually done in a sleep lab. For the study, you spend the night in the lab, and you are hooked up to different machines that monitor your heart rate, breathing, and other body functions. The results of the test will tell your doctor or nurse if you have the disorder.  Is there anything I can do on my own to help my sleep " "apnea? -- Yes. Here are some things that might help:  ?Stay off your back when sleeping. (This is not always practical, because people cannot control their position while asleep. Plus, it only helps some people.)  ?Lose weight, if you are overweight  ?Avoid alcohol, because it can make sleep apnea worse  How is sleep apnea treated? -- The most effective treatment for sleep apnea is a device that keeps your airway open while you sleep. Treatment with this device is called \"continuous positive airway pressure,\" or CPAP. People getting CPAP wear a face mask at night that keeps them breathing (figure 2).  If your doctor or nurse recommends a CPAP machine, try to be patient about using it. The mask might seem uncomfortable to wear at first, and the machine might seem noisy, but using the machine can really pay off. People with sleep apnea who use a CPAP machine feel more rested and generally feel better.  There is also another device that you wear in your mouth called an \"oral appliance\" or \"mandibular advancement device.\" It also helps keep your airway open while you sleep.  In rare cases, when nothing else helps, doctors recommend surgery to keep the airway open. Surgery to do this is not always effective, and even when it is, the problem can come back.  Is sleep apnea dangerous? -- It can be. People with sleep apnea do not get good-quality sleep, so they are often tired and not alert. This puts them at risk for car accidents and other types of accidents. Plus, studies show that people with sleep apnea are more likely than others to have high blood pressure, heart attacks, and other serious heart problems. In people with severe sleep apnea, getting treated (for example, with a CPAP machine) can help prevent some of these problems.     GRAPHICS  Airway in a person with sleep apnea    Normally when a person sleeps, the airway remains open, and air can pass from the nose and mouth to the lungs. In a person with sleep " apnea, parts of the throat and mouth drop into the airway and block off the flow of air. This can cause loud snoring and interrupt breathing for short periods.  Graphic 53704 Version 5.0      Continuous positive airway pressure (CPAP) for sleep apnea    The CPAP mask gently blows air into your nose while you sleep. It puts just enough pressure on your airway to keep it from closing. The mask in this picture fits over just the nose. Other CPAP devices have masks that fit over the nose and mouth.    Patient education: What is a sleep study?     What is a sleep study? -- A sleep study is a test that measures how well you sleep and checks for sleep problems. For some sleep studies, you stay overnight in a sleep lab at a hospital or sleep center.     What happens during a sleep study? -- Before you go to sleep, a technician attaches small, sticky patches called  electrodes  to your head, chest, and legs. He or she will also place a small tube beneath your nose and might wrap 1 or 2 belts around your chest.   Each of these items has wires that connect to monitors. The monitors record your movement, brain activity, breathing, and other body functions while you sleep.  If you have a history of trouble falling asleep, your doctor might prescribe a medicine to help you fall asleep in the lab. If you have never taken the medicine before, your doctor might ask you take it on a night before your sleep study to see how it affects you.   Why might my doctor order a sleep study? -- Your doctor will order a sleep study if he or she thinks you have sleep apnea or a different condition that makes you:   ?Have sudden jerking leg movements while you sleep, called  periodic limb movements.    ?Feel very sleepy during the day and fall asleep all of a sudden, called  narcolepsy.    ?Have trouble falling asleep or staying asleep over a long period of time, called  chronic insomnia.    ?Do odd things while you sleep, such as walking.  How  should I prepare for a sleep study? -- On the day of your sleep study, you should:   ?Avoid alcohol   ?Avoid drinking coffee, tea, sodas, and other drinks that have caffeine in the afternoon and evening   ?Take all of your regular medicines     The cost of care estimate line is [phone]. They are able to give the patient an estimate of the charges and also an estimate of their insurance coverage/patient responsibility.   After your sleep study is performed, please call us at [phone] or [phone]  to schedule for a follow up to review the results of the sleep study.    Please bring one tab of low dose melatonin 3 mg or less to the night of the study.    Melatonin intake is completely voluntary.    You may take own melatonin after arrival to sleep center. Do not drive or operate machinery after intake of melatonin.

## 2022-05-09 NOTE — NURSING NOTE
Patient denies any changes since echeck-in regarding medication and allergies and states all information entered during echeck-in remains accurate.    Karyna Bowie, VF

## 2022-05-09 NOTE — PROGRESS NOTES
Karolina is a 42 year old who is being evaluated via a billable video visit.  Currently in [facility].    How would you like to obtain your AVS? MyChart  If the video visit is dropped, the invitation should be resent by: Send to e-mail at: [email or web address]  Will anyone else be joining your video visit? No    Karyna Bowie VF    Video Start Time: 9:21 AM  Video-Visit Details    Type of service:  Video Visit    Video End Time:9:35 AM    Originating Location (pt. Location): Home    Distant Location (provider location):  [facility]     Platform used for Video Visit: [employer]     Additional 10 minutes on the date of service was spent performing the following:    -Preparing to see the patient (eg, review of tests)   -Ordering medications, tests, or procedures   -Documenting clinical information in the electronic or other health record     Thank you for the opportunity to participate in the care of Karolina Herrera.     She is a 42 year old  female patient who comes to the sleep medicine clinic for review of sleep study results. The study was completed on 03/05/22  which showed that the patient had severe obstructive sleep apnea with an apnea hypopnea index of 51.9 events per hour with the lowest O2 Sat of 82%. The patient was also found to have significant sleep related hypoxia.    Assessment and Plan:  In summary Karolina Herrera is a 42 year old year old female here for review of sleep study results.  1. Obstructive Sleep Apnea/Sleep related hypoxia/Hypersomnia  We had an extensive conversation to review the results of her sleep study and to  her on the importance of treating sleep apnea. We discussed the options of treatment in lab titration study versus home auto CPAP. Patient decided to proceed in lab titration study. Since the patient does not have any preference, we will use [employer] as the durable medical equipment company after the titration study is  performed.    ALISA:  ALISA Total Score: 6  Total score - Olga: 4 (5/6/2022  9:04 AM)    Patient Active Problem List   Diagnosis     Family history of malignant neoplasm of breast     PCOS (polycystic ovarian syndrome)     Irritable bowel syndrome without diarrhea     BMI 40.0-44.9, adult (H)     ACP (advance care planning)     History of gestational diabetes     Vitamin D deficiency     Irritable bowel syndrome       Current Outpatient Medications   Medication Sig Dispense Refill     albuterol (PROAIR HFA/PROVENTIL HFA/VENTOLIN HFA) 108 (90 Base) MCG/ACT inhaler Inhale 2 puffs into the lungs every 4 hours as needed for shortness of breath / dyspnea, wheezing or other (cough) (Patient not taking: Reported on 4/29/2022) 1 Inhaler 0     Multiple Vitamins-Minerals (MULTIVITAMIN ADULT PO) Take 1 tablet by mouth       sertraline (ZOLOFT) 25 MG tablet          No Known Allergies    Physical Exam:  GEN: NAD  Psych: normal mood, normal affect     Labs/Studies:  - We reviewed the results of the overnight study as described on the HPI.     No results found for: PH, PHARTERIAL, PO2, SZ0FPUHOSES, SAT, PCO2, HCO3, BASEEXCESS, MUNIRA, BEB  Lab Results   Component Value Date    TSH 1.87 10/28/2017    TSH 1.90 09/26/2016     Lab Results   Component Value Date     (H) 09/02/2016     Lab Results   Component Value Date    HGB 13.6 11/15/2019    HGB 13.6 09/02/2016     Lab Results   Component Value Date    BUN 15 09/02/2016    CR 0.66 11/10/2020    CR 0.72 09/02/2016     Lab Results   Component Value Date    AST 18 09/02/2016    ALT 32 09/02/2016    ALKPHOS 76 09/02/2016    BILITOTAL 0.4 09/02/2016     No results found for: UAMP, UBARB, BENZODIAZEUR, UCANN, UCOC, OPIT, UPCP    Recent Labs   Lab Test 11/10/20  0822 09/02/16  0622   NA  --  140   POTASSIUM  --  4.1   CHLORIDE  --  110*   CO2  --  21   ANIONGAP  --  9   GLC  --  122*   BUN  --  15   CR 0.66 0.72   PAMELA  --  8.2*       No results found for: FAUSTINA      Patient verbalized  understanding of these issues, agrees with the plan and all questions were answered today. Patient was given an opportuntity to voice any other symptoms or concerns not listed above. Patient did not have any other symptoms or concerns.      Yossi Chandra DO  Board Certified in Internal Medicine and Sleep Medicine      (Note created with Dragon voice recognition and unintended spelling errors and word substitutions may occur)

## 2022-05-20 ENCOUNTER — MYC MEDICAL ADVICE (OUTPATIENT)
Dept: FAMILY MEDICINE | Facility: CLINIC | Age: 42
End: 2022-05-20
Payer: COMMERCIAL

## 2022-05-20 DIAGNOSIS — G47.30 SLEEP APNEA, UNSPECIFIED TYPE: ICD-10-CM

## 2022-05-20 DIAGNOSIS — R09.81 NASAL CONGESTION: Primary | ICD-10-CM

## 2022-06-02 ENCOUNTER — TELEPHONE (OUTPATIENT)
Dept: SLEEP MEDICINE | Facility: CLINIC | Age: 42
End: 2022-06-02
Payer: COMMERCIAL

## 2022-06-02 DIAGNOSIS — G47.33 OSA (OBSTRUCTIVE SLEEP APNEA): Primary | ICD-10-CM

## 2022-06-03 NOTE — TELEPHONE ENCOUNTER
FUTURE VISIT INFORMATION      FUTURE VISIT INFORMATION:    Date: 8/31/22    Time: 3PM    Location: [facility]  REFERRAL INFORMATION:    Referring provider:  Kim Urbina APRN CNP    Referring providers clinic:  [facility]    Reason for visit/diagnosis  Sched per pt // Ref by: Kim Urbina CNP // DX: Nasal Congestion // [facility] location verified    RECORDS REQUESTED FROM:       Clinic name Comments Records Status Imaging Status   [facility] 5/20/22 referral  Epic

## 2022-06-24 ENCOUNTER — MYC MEDICAL ADVICE (OUTPATIENT)
Dept: FAMILY MEDICINE | Facility: CLINIC | Age: 42
End: 2022-06-24

## 2022-06-24 DIAGNOSIS — M72.2 PLANTAR FASCIITIS: Primary | ICD-10-CM

## 2022-07-12 ENCOUNTER — VIRTUAL VISIT (OUTPATIENT)
Dept: URGENT CARE | Facility: CLINIC | Age: 42
End: 2022-07-12
Payer: COMMERCIAL

## 2022-07-12 DIAGNOSIS — U07.1 COVID: Primary | ICD-10-CM

## 2022-07-12 PROCEDURE — 99207 PR NO CHARGE LOS: CPT | Performed by: EMERGENCY MEDICINE

## 2022-07-25 ENCOUNTER — LAB (OUTPATIENT)
Dept: LAB | Facility: CLINIC | Age: 42
End: 2022-07-25
Attending: INTERNAL MEDICINE
Payer: COMMERCIAL

## 2022-07-25 DIAGNOSIS — G47.33 OSA (OBSTRUCTIVE SLEEP APNEA): ICD-10-CM

## 2022-07-25 LAB — SARS-COV-2 RNA RESP QL NAA+PROBE: NEGATIVE

## 2022-07-25 PROCEDURE — U0005 INFEC AGEN DETEC AMPLI PROBE: HCPCS

## 2022-07-25 PROCEDURE — U0003 INFECTIOUS AGENT DETECTION BY NUCLEIC ACID (DNA OR RNA); SEVERE ACUTE RESPIRATORY SYNDROME CORONAVIRUS 2 (SARS-COV-2) (CORONAVIRUS DISEASE [COVID-19]), AMPLIFIED PROBE TECHNIQUE, MAKING USE OF HIGH THROUGHPUT TECHNOLOGIES AS DESCRIBED BY CMS-2020-01-R: HCPCS

## 2022-07-29 ENCOUNTER — THERAPY VISIT (OUTPATIENT)
Dept: SLEEP MEDICINE | Facility: CLINIC | Age: 42
End: 2022-07-29
Payer: COMMERCIAL

## 2022-07-29 DIAGNOSIS — G47.10 HYPERSOMNIA: ICD-10-CM

## 2022-07-29 DIAGNOSIS — G47.33 OBSTRUCTIVE SLEEP APNEA: ICD-10-CM

## 2022-07-29 PROCEDURE — 95811 POLYSOM 6/>YRS CPAP 4/> PARM: CPT | Performed by: INTERNAL MEDICINE

## 2022-07-29 NOTE — TELEPHONE ENCOUNTER
FUTURE VISIT INFORMATION      FUTURE VISIT INFORMATION:    Date: 11.8.22    Time: 1:00    Location: [facility]  REFERRAL INFORMATION:    Referring provider:  Carlitos    Referring providers clinic:  Family Medicine    Reason for visit/diagnosis  Nasal congestion , referred by Kmi Urbina APRN CNP, Recs in Epic, per [facility]    RECORDS REQUESTED FROM:       Clinic name Comments Records Status   FM 5.20.22  [facility]   Derm 3.14.22  [facility]

## 2022-07-30 NOTE — NURSING NOTE
Completed a all night titration PSG per provider order.    Preliminary AHI 51.9.  A final therapeutic PAP pressure was achieved.    Supine REM was not seen on therapeutic pressure.    Patient reports feeling refreshed in AM.

## 2022-08-01 ENCOUNTER — TELEPHONE (OUTPATIENT)
Dept: SLEEP MEDICINE | Facility: CLINIC | Age: 42
End: 2022-08-01

## 2022-08-01 DIAGNOSIS — G47.33 OBSTRUCTIVE SLEEP APNEA: Primary | ICD-10-CM

## 2022-08-01 LAB — SLPCOMP: NORMAL

## 2022-08-01 NOTE — TELEPHONE ENCOUNTER
Titration study interpreted. Will put in order for CPAP with [employer]. Please call patient to schedule for  of device. Thanks.

## 2022-08-22 ENCOUNTER — OFFICE VISIT (OUTPATIENT)
Dept: SLEEP MEDICINE | Facility: CLINIC | Age: 42
End: 2022-08-22
Payer: COMMERCIAL

## 2022-08-22 VITALS
OXYGEN SATURATION: 99 % | HEIGHT: 67 IN | RESPIRATION RATE: 16 BRPM | SYSTOLIC BLOOD PRESSURE: 122 MMHG | BODY MASS INDEX: 45.99 KG/M2 | DIASTOLIC BLOOD PRESSURE: 77 MMHG | WEIGHT: 293 LBS | HEART RATE: 76 BPM

## 2022-08-22 DIAGNOSIS — G47.63 SLEEP RELATED BRUXISM: ICD-10-CM

## 2022-08-22 DIAGNOSIS — G47.33 OBSTRUCTIVE SLEEP APNEA: Primary | ICD-10-CM

## 2022-08-22 DIAGNOSIS — G47.61 PERIODIC LIMB MOVEMENT: ICD-10-CM

## 2022-08-22 PROCEDURE — 99213 OFFICE O/P EST LOW 20 MIN: CPT | Performed by: INTERNAL MEDICINE

## 2022-08-22 RX ORDER — SERTRALINE HYDROCHLORIDE 100 MG/1
100 TABLET, FILM COATED ORAL DAILY
COMMUNITY
Start: 2022-06-24 | End: 2023-10-17

## 2022-08-22 ASSESSMENT — SLEEP AND FATIGUE QUESTIONNAIRES
HOW LIKELY ARE YOU TO NOD OFF OR FALL ASLEEP WHILE SITTING QUIETLY AFTER LUNCH WITHOUT ALCOHOL: SLIGHT CHANCE OF DOZING
HOW LIKELY ARE YOU TO NOD OFF OR FALL ASLEEP WHILE SITTING AND TALKING TO SOMEONE: WOULD NEVER DOZE
HOW LIKELY ARE YOU TO NOD OFF OR FALL ASLEEP WHILE SITTING AND READING: MODERATE CHANCE OF DOZING
HOW LIKELY ARE YOU TO NOD OFF OR FALL ASLEEP WHILE LYING DOWN TO REST IN THE AFTERNOON WHEN CIRCUMSTANCES PERMIT: MODERATE CHANCE OF DOZING
HOW LIKELY ARE YOU TO NOD OFF OR FALL ASLEEP WHILE WATCHING TV: SLIGHT CHANCE OF DOZING
HOW LIKELY ARE YOU TO NOD OFF OR FALL ASLEEP WHEN YOU ARE A PASSENGER IN A CAR FOR AN HOUR WITHOUT A BREAK: HIGH CHANCE OF DOZING
HOW LIKELY ARE YOU TO NOD OFF OR FALL ASLEEP WHILE SITTING INACTIVE IN A PUBLIC PLACE: WOULD NEVER DOZE
HOW LIKELY ARE YOU TO NOD OFF OR FALL ASLEEP IN A CAR, WHILE STOPPED FOR A FEW MINUTES IN TRAFFIC: WOULD NEVER DOZE

## 2022-08-22 NOTE — PATIENT INSTRUCTIONS
Equipment Instructions    We will process your PAP order and send it to a Durable Medical Equipment (DME) provider.    The medical equipment company should call you within 7 days.  If you have not heard from the company, please contact them to see if they received your order and are planning to call you.    Please call us at [phone] if you are unable to contact the medical equipment company or if they do not have the order.    If you are starting a new PAP machine, please call us after you use it the first night to let us know how it went. This call also helps us know that you received your equipment and that everything is ready. Please use our central phone number [phone]    Contact information for [employer] company:    [facility] Tel: [phone]

## 2022-08-22 NOTE — NURSING NOTE
"Chief Complaint   Patient presents with     Study Results       Initial /77   Pulse 76   Resp 16   Ht 1.702 m (5' 7\")   Wt 133.9 kg (295 lb 3.2 oz)   SpO2 99%   BMI 46.23 kg/m   Estimated body mass index is 46.23 kg/m  as calculated from the following:    Height as of this encounter: 1.702 m (5' 7\").    Weight as of this encounter: 133.9 kg (295 lb 3.2 oz).    Medication Reconciliation: complete  ESS:9  Neck circumference: 46 centimeters.  DME: N/A  eFlicita Reilly CMA      "

## 2022-08-22 NOTE — PROGRESS NOTES
Additional 10 minutes on the date of service was spent performing the following:    -Preparing to see the patient (eg, review of tests)   -Ordering medications, tests, or procedures   -Documenting clinical information in the electronic or other health record     Thank you for the opportunity to participate in the care of Karolina Herrera.     She is a 42 year old  female patient who comes to the sleep medicine clinic for review of titration sleep study results. The study was completed on 07/29/2022 which showed that the patient sleep apnea was optimally treated with a CPAP pressure of 8 cm water.  The patient was also found to have periodic limb movement of sleep as well as sleep-related bruxism.    Assessment and Plan:  In summary Karolina Herrera is a 42 year old year old female here for review of sleep study results.  1. Obstructive sleep apnea  The order has already been placed for the patient to receive CPAP therapy.  Advised the patient to call [email or web address] as soon as possible to schedule for mask fitting ASAP.    2. Sleep related bruxism  Advised the patient to wear a mouthguard at night to help mitigate enamel erosion.    3. Periodic limb movement  We will hold off on addressing limb movements at this time.  If the patient continues to have periodic limb movement during sleep well on optimal CPAP therapy, we will need to consider further diagnostic options.    ALISA:  ALISA Total Score: 7  Total score - [place]: 9 (8/22/2022  7:59 AM)    Patient Active Problem List   Diagnosis     Family history of malignant neoplasm of breast     PCOS (polycystic ovarian syndrome)     Irritable bowel syndrome without diarrhea     BMI 40.0-44.9, adult (H)     ACP (advance care planning)     History of gestational diabetes     Vitamin D deficiency     Irritable bowel syndrome       Current Outpatient Medications   Medication Sig Dispense Refill     Multiple Vitamins-Minerals (MULTIVITAMIN  "ADULT PO) Take 1 tablet by mouth       sertraline (ZOLOFT) 100 MG tablet Take 100 mg by mouth daily Take 100 mg by mouth daily       albuterol (PROAIR HFA/PROVENTIL HFA/VENTOLIN HFA) 108 (90 Base) MCG/ACT inhaler Inhale 2 puffs into the lungs every 4 hours as needed for shortness of breath / dyspnea, wheezing or other (cough) (Patient not taking: No sig reported) 1 Inhaler 0       No Known Allergies    Physical Exam:  /77   Pulse 76   Resp 16   Ht 1.702 m (5' 7\")   Wt 133.9 kg (295 lb 3.2 oz)   SpO2 99%   BMI 46.23 kg/m    BMI:Body mass index is 46.23 kg/m .   GEN: NAD,   Head: Normocephalic.  Psych: normal mood, normal affect     Patient verbalized understanding of these issues, agrees with the plan and all questions were answered today. Patient was given an opportuntity to voice any other symptoms or concerns not listed above. Patient did not have any other symptoms or concerns.      Yossi Chandra DO  Board Certified in Internal Medicine and Sleep Medicine      (Note created with Dragon voice recognition and unintended spelling errors and word substitutions may occur)     "

## 2022-08-25 DIAGNOSIS — F41.9 ANXIETY: Primary | ICD-10-CM

## 2022-08-25 RX ORDER — LORAZEPAM 1 MG/1
1 TABLET ORAL
Qty: 1 TABLET | Refills: 0 | Status: SHIPPED | OUTPATIENT
Start: 2022-08-25 | End: 2023-10-17

## 2022-08-31 ENCOUNTER — DOCUMENTATION ONLY (OUTPATIENT)
Dept: SLEEP MEDICINE | Facility: CLINIC | Age: 42
End: 2022-08-31

## 2022-08-31 ENCOUNTER — OFFICE VISIT (OUTPATIENT)
Dept: OTOLARYNGOLOGY | Facility: CLINIC | Age: 42
End: 2022-08-31
Attending: NURSE PRACTITIONER
Payer: COMMERCIAL

## 2022-08-31 ENCOUNTER — PRE VISIT (OUTPATIENT)
Dept: OTOLARYNGOLOGY | Facility: CLINIC | Age: 42
End: 2022-08-31
Payer: COMMERCIAL

## 2022-08-31 VITALS
HEART RATE: 94 BPM | TEMPERATURE: 97.3 F | OXYGEN SATURATION: 98 % | DIASTOLIC BLOOD PRESSURE: 74 MMHG | WEIGHT: 293 LBS | BODY MASS INDEX: 45.99 KG/M2 | HEIGHT: 67 IN | SYSTOLIC BLOOD PRESSURE: 120 MMHG

## 2022-08-31 DIAGNOSIS — G47.33 OSA (OBSTRUCTIVE SLEEP APNEA): Primary | ICD-10-CM

## 2022-08-31 DIAGNOSIS — R09.81 NASAL CONGESTION: Primary | ICD-10-CM

## 2022-08-31 DIAGNOSIS — J34.3 HYPERTROPHY OF INFERIOR NASAL TURBINATE: ICD-10-CM

## 2022-08-31 DIAGNOSIS — G47.30 SLEEP APNEA, UNSPECIFIED TYPE: ICD-10-CM

## 2022-08-31 DIAGNOSIS — J34.2 DEVIATED NASAL SEPTUM: ICD-10-CM

## 2022-08-31 PROCEDURE — 31231 NASAL ENDOSCOPY DX: CPT | Performed by: OTOLARYNGOLOGY

## 2022-08-31 PROCEDURE — 99203 OFFICE O/P NEW LOW 30 MIN: CPT | Mod: 25 | Performed by: OTOLARYNGOLOGY

## 2022-08-31 ASSESSMENT — PAIN SCALES - GENERAL: PAINLEVEL: NO PAIN (0)

## 2022-08-31 NOTE — LETTER
"8/31/2022       RE: Karolina Herrera  [address]  [facility] [place] [address]     Dear Colleague,    Thank you for referring your patient, Karolina Herrera, to the [facility] [place] [facility]. Please see a copy of my visit note below.    [facility]  New Patient Visit      Encounter date:   August 31, 2022    Referring Provider:   Kim Urbina, APRN CNP  [address]  [place] [address]    Chief Complaint: Nasal congestion    History of Present Illness:   Karolina Herrera is a 42 year old female with history of GILDARDO (starting CPAP today) who presents for consultation regarding nasal congestion. At her recent sleep study she informed them that she almost exclusively breathes through her mouth. She has known for sometime that she breathes through her mouth and denies any significant nasal drainage or blockage. She does not appreciated one side being worse than the other.     She sought out sleep study after her sister informed her her sleeping breathing seemed \"off\".     No prior history of sinonasal surgery.       Sino-Nasal Outcome Test (SNOT - 22)  DNT       [place] Oper[facility] History:  No sinonasal surgeries    Review of systems: A 14-point review of systems has been conducted and was negative for any notable symptoms, except as dictated in the history of present illness.     Medical History:  Past Medical History:   Diagnosis Date     Abnormal vaginal bleeding 2008 and 2009     Amblyopia      At high risk for breast cancer      Encounter for gynecological examination without abnormal finding 10/19/2015    Overview:  Wife is Peyton.  Both are . Pap neg 6/10/ 2013: record in Care Everywhere from [facility]      Family history of malignant neoplasm of breast      High risk pregnancy, antepartum 3/30/2018    Overview:  Prenatal provider & planned delivery site: [facility] Wife is " Peyton.   IVF through [facility] with donor from sperm bank.  Same donor as first pregnancy with their son Huy.  Labs and records requested from [facility].   Initial prenatal bloodwork ___  GC/CT ___- These labs were deferred to wait for records from [facility].   Pregravid BMI >40.  Early GCT ____ UC done.  Pap up to date  Geneti     Low back pain 2014     Morbid obesity due to excess calories (H) 3/25/2016     Obesity with body mass index 30 or greater 2014        Surgical History:   Past Surgical History:   Procedure Laterality Date     GYN SURGERY  2014    Polypectomy     IVS      invitro fertilization     LAPAROSCOPIC CHOLECYSTECTOMY N/A 2016    Procedure: LAPAROSCOPIC CHOLECYSTECTOMY;  Surgeon: Juan F Sherman MD;  [facility]     wisdom teeth removed       Z SLEEP STUDY, UNATTENDED, RECORD HEART RATE/O2 SAT/RESP ANAL/SLEEP TM  3/7/2022             Family History:  Family History   Problem Relation Age of Onset     Skin Cancer Mother      Glaucoma Father      Esophageal Cancer Maternal Grandfather          in 60s; hx of smoking     Bladder Cancer Paternal Grandfather          in 80s     Breast Cancer Sister 39        invasive ductal carcinoma, treated with lumpectomy, radiation and tamoxifen     Breast Cancer Paternal Aunt         paternal great aunt in 70s     Breast Cancer Sister         Age 39 at diagnosis     Macular Degeneration No family hx of         Social History:   Social History     Socioeconomic History     Marital status:      Spouse name: Samantha     Number of children: 2   Occupational History     Comment: works for [employer]   Tobacco Use     Smoking status: Never Smoker     Smokeless tobacco: Never Used   Vaping Use     Vaping Use: Never used   Substance and Sexual Activity     Alcohol use: Yes     Comment: 1-2 per week.     Drug use: No     Sexual activity: Yes     Partners: Female     Birth control/protection: None   Other Topics Concern      " Service No     Blood Transfusions No     Caffeine Concern No     Occupational Exposure No     Hobby Hazards No     Sleep Concern No     Stress Concern No     Weight Concern No     Special Diet No     Back Care No     Exercise Yes     Comment: walks dog 2-3 times/day; prenatal exercises     Seat Belt Yes     Self-Exams Yes     Parent/sibling w/ CABG, MI or angioplasty before 65F 55M? No        Physical Exam:  Vital signs: /74 (BP Location: Left arm, Patient Position: Sitting, Cuff Size: Adult Large)   Pulse 94   Temp 97.3  F (36.3  C) (Temporal)   Ht 1.702 m (5' 7\")   Wt 135.1 kg (297 lb 12.8 oz)   SpO2 98%   BMI 46.64 kg/m     General Appearance: No acute distress, appropriate demeanor, conversant  Eyes: moist conjunctivae; EOMI; pupils symmetric; visual acuity grossly intact; no proptosis  Head: normocephalic; overall symmetric appearance without deformity  Face: overall symmetric without deformity; HB I/VI  Ears: Normal appearance of external ear; external meatus normal in appearance; TMs intact without perforation bilaterally;   Nose: No external deformity; septum deviated to right causing greater than 70% obstruction; inferior turbinates without significant hypertrophy  Oral Cavity/oropharynx: Normal appearance of mucosa; tongue midline; no mass or lesions; oropharynx without obvious mucosal abnormality  Neck: no palpable lymphadenopathy; thyroid without palpable nodules  Lungs: symmetric chest rise; no wheezing  CV: Good distal perfusion; normal hear rate  Extremities: No deformity  Neurologic Exam: Cranial nerves II-XII are grossly intact; no focal deficit      Procedure Note  Procedure performed: Rigid nasal endoscopy  Indication: To evaluate for sinonasal pathology not visualized on routine anterior rhinoscopy  Anesthesia: 4% topical lidocaine with 0.05% oxymetazoline  Description of procedure: A 30 degree, 3 mm rigid endoscope was inserted into bilateral nasal cavities and the nasal " valves, nasal cavity, middle meatus, sphenoethmoid recess, and nasopharynx were thoroughly evaluated for evidence of obstruction, edema, purulence, polyps and/or mass/lesion.     Lora-Foster Endoscopic Scoring System  Endoscopic observation Right Left   Polyps in middle meatus (0 = absent, 1 = restricted to middle meatus, 2 = Beyond middle meatus) 0 0   Discharge (0 = absent, 1 = thin and clear, 2 = thick, purulent) 0 0   Edema (0 = absent, 1 = mild-moderate, 2 = moderate-severe) 0 0   Crusting (0 = absent, 1 = mild-moderate, 2 = moderate-severe) 0 0   Scarring (0= absent, 1 = mild-moderate, 2 = moderate-severe) 0 0   Total 0 0     Findings  RT: broad caudal septal deviation limits evaluation of SER; MM is clear  LT: MM and SER clear    The patient tolerated the procedure well without complication.     Laboratory Review:  n/a    Imaging Review:  n/a    Pathology Review:  n/a    Assessment/Medical Decision Making:  Severe rightward deviation   Inferior turbinate hypertrophy  Nasal obstruction secondary to above  GILDARDO, on CPAP      Plan:  Bilateral endoscopy performed today shows no signs of active sinusitis or polyps, however there is a severe broad rightward deviation that is likely the contributing to her breathing. I would like for her to start Flonase and that if this does not improve symptoms we could go forth she could pursue consult with [facility] to discuss septorhinoplasty. Since her daily symptoms are relatively mild, she deferred FPRS consult and would like to start with Flonase. If she would like to pursue surgery we will refer her to Dr. Abad of facial plastics.  Follow-up as needed.     Jordon Borrero MD    [facility]  Rhinology  Endoscopic Skull Base Surgery  [facility]  Department of Otolaryngology - Head & Neck Surgery    Scribe Disclosure:  I, Bong Escobedo, am serving as a scribe to document services personally performed by Jordon Borrero MD at  this visit, based upon the provider's statements to me. All documentation has been reviewed by the aforementioned provider prior to being entered into the official medical record.       Again, thank you for allowing me to participate in the care of your patient.      Sincerely,    Jordon Borrero MD

## 2022-08-31 NOTE — NURSING NOTE
"Blood pressure 120/74, pulse 94, temperature 97.3  F (36.3  C), temperature source Temporal, height 1.702 m (5' 7\"), weight 135.1 kg (297 lb 12.8 oz), SpO2 98 %, currently breastfeeding.    Za Anderson LPN    "

## 2022-08-31 NOTE — PROGRESS NOTES
Patient was offered choice of vendor and chose [facility].  Patient Karolina Herrera was set up at [facility] on August 31, 2022. Patient received a [employer] Airsense 11 Pressures were set at 8 cm H2O.   Patient s ramp is 5 cm H2O for Auto and FLEX/EPR is 2.  Patient received a [employer]med Mask name: Airfit F20  Full Face mask size Medium, heated tubing and heated humidifier.  Patient does not need to meet compliance.     Lesli Posadas

## 2022-08-31 NOTE — PATIENT INSTRUCTIONS
"You were seen in the clinic today by Dr. Borrero. If you have any questions or concerns after your appointment, please call the clinic at [phone]. Press \"1\" for scheduling, press \"3\" for nurse advice.    2.   The following has been recommended for you based upon your appointment today:   -Flonase (fluticasone) nasal spray: Spray 2 sprays per nostril every evening.    3.   If symptoms worsen or fail to improve, please return to the ENT clinic.         Samantha Villegas LPN  [facility]  Department of Otolaryngology  [phone]   "

## 2022-08-31 NOTE — PROGRESS NOTES
"  [facility]  New Patient Visit      Encounter date:   August 31, 2022    Referring Provider:   Kim Urbina, APRN CNP  [address]  [place] [address]    Chief Complaint: Nasal congestion    History of Present Illness:   Karolina Herrera is a 42 year old female with history of GILDARDO (starting CPAP today) who presents for consultation regarding nasal congestion. At her recent sleep study she informed them that she almost exclusively breathes through her mouth. She has known for sometime that she breathes through her mouth and denies any significant nasal drainage or blockage. She does not appreciated one side being worse than the other.     She sought out sleep study after her sister informed her her sleeping breathing seemed \"off\".     No prior history of sinonasal surgery.       Sino-Nasal Outcome Test (SNOT - 22)  [facility] Operative History:  No sinonasal surgeries    Review of systems: A 14-point review of systems has been conducted and was negative for any notable symptoms, except as dictated in the history of present illness.     Medical History:  Past Medical History:   Diagnosis Date     Abnormal vaginal bleeding 2008 and 2009     Amblyopia      At high risk for breast cancer      Encounter for gynecological examination without abnormal finding 10/19/2015    Overview:  Wife is Peyton.  Both are . Pap neg 6/10/ 2013: record in Care Everywhere from [facility]      Family history of malignant neoplasm of breast      High risk pregnancy, antepartum 3/30/2018    Overview:  Prenatal provider & planned delivery site: [facility] Wife is Peyton.   IVF through [facility] with donor from sperm bank.  Same donor as first pregnancy with their son Huy.  Labs and records requested from [facility].   Initial prenatal bloodwork ___  GC/CT ___- These labs were deferred to wait for records from [facility].   Pregravid BMI >40.  Early GCT ____ UC done.  Pap up to date 7/17 Geneti     Low back " pain 2014     Morbid obesity due to excess calories (H) 3/25/2016     Obesity with body mass index 30 or greater 2014        Surgical History:   Past Surgical History:   Procedure Laterality Date     GYN SURGERY  2014    Polypectomy     IVS  2015    invitro fertilization     LAPAROSCOPIC CHOLECYSTECTOMY N/A 2016    Procedure: LAPAROSCOPIC CHOLECYSTECTOMY;  Surgeon: Juan F Sherman MD;  [facility]     wisdom teeth removed       ZZC SLEEP STUDY, UNATTENDED, RECORD HEART RATE/O2 SAT/RESP ANAL/SLEEP TM  3/7/2022             Family History:  Family History   Problem Relation Age of Onset     Skin Cancer Mother      Glaucoma Father      Esophageal Cancer Maternal Grandfather          in 60s; hx of smoking     Bladder Cancer Paternal Grandfather          in 80s     Breast Cancer Sister 39        invasive ductal carcinoma, treated with lumpectomy, radiation and tamoxifen     Breast Cancer Paternal Aunt         paternal great aunt in 70s     Breast Cancer Sister         Age 39 at diagnosis     Macular Degeneration No family hx of         Social History:   Social History     Socioeconomic History     Marital status:      Spouse name: Samantha     Number of children: 2   Occupational History     Comment: works for [employer]   Tobacco Use     Smoking status: Never Smoker     Smokeless tobacco: Never Used   Vaping Use     Vaping Use: Never used   Substance and Sexual Activity     Alcohol use: Yes     Comment: 1-2 per week.     Drug use: No     Sexual activity: Yes     Partners: Female     Birth control/protection: None   Other Topics Concern      Service No     Blood Transfusions No     Caffeine Concern No     Occupational Exposure No     Hobby Hazards No     Sleep Concern No     Stress Concern No     Weight Concern No     Special Diet No     Back Care No     Exercise Yes     Comment: walks dog 2-3 times/day; prenatal exercises     Seat Belt Yes     Self-Exams Yes  "    Parent/sibling w/ CABG, MI or angioplasty before 65F 55M? No        Physical Exam:  Vital signs: /74 (BP Location: Left arm, Patient Position: Sitting, Cuff Size: Adult Large)   Pulse 94   Temp 97.3  F (36.3  C) (Temporal)   Ht 1.702 m (5' 7\")   Wt 135.1 kg (297 lb 12.8 oz)   SpO2 98%   BMI 46.64 kg/m     General Appearance: No acute distress, appropriate demeanor, conversant  Eyes: moist conjunctivae; EOMI; pupils symmetric; visual acuity grossly intact; no proptosis  Head: normocephalic; overall symmetric appearance without deformity  Face: overall symmetric without deformity; HB I/VI  Ears: Normal appearance of external ear; external meatus normal in appearance; TMs intact without perforation bilaterally;   Nose: No external deformity; septum deviated to right causing greater than 70% obstruction; inferior turbinates without significant hypertrophy  Oral Cavity/oropharynx: Normal appearance of mucosa; tongue midline; no mass or lesions; oropharynx without obvious mucosal abnormality  Neck: no palpable lymphadenopathy; thyroid without palpable nodules  Lungs: symmetric chest rise; no wheezing  CV: Good distal perfusion; normal hear rate  Extremities: No deformity  Neurologic Exam: Cranial nerves II-XII are grossly intact; no focal deficit      Procedure Note  Procedure performed: Rigid nasal endoscopy  Indication: To evaluate for sinonasal pathology not visualized on routine anterior rhinoscopy  Anesthesia: 4% topical lidocaine with 0.05% oxymetazoline  Description of procedure: A 30 degree, 3 mm rigid endoscope was inserted into bilateral nasal cavities and the nasal valves, nasal cavity, middle meatus, sphenoethmoid recess, and nasopharynx were thoroughly evaluated for evidence of obstruction, edema, purulence, polyps and/or mass/lesion.     [place]owa-Foster Endoscopic Scoring System  Endoscopic observation Right Left   Polyps in middle meatus (0 = absent, 1 = restricted to middle meatus, 2 = Beyond " middle meatus) 0 0   Discharge (0 = absent, 1 = thin and clear, 2 = thick, purulent) 0 0   Edema (0 = absent, 1 = mild-moderate, 2 = moderate-severe) 0 0   Crusting (0 = absent, 1 = mild-moderate, 2 = moderate-severe) 0 0   Scarring (0= absent, 1 = mild-moderate, 2 = moderate-severe) 0 0   Total 0 0     Findings  RT: broad caudal septal deviation limits evaluation of SER; MM is clear  LT: MM and SER clear    The patient tolerated the procedure well without complication.     Laboratory Review:  n/a    Imaging Review:  n/a    Pathology Review:  n/a    Assessment/Medical Decision Making:  Severe rightward deviation   Inferior turbinate hypertrophy  Nasal obstruction secondary to above  GILDARDO, on CPAP      Plan:  Bilateral endoscopy performed today shows no signs of active sinusitis or polyps, however there is a severe broad rightward deviation that is likely the contributing to her breathing. I would like for her to start Flonase and that if this does not improve symptoms we could go forth she could pursue consult with FPRS to discuss septorhinoplasty. Since her daily symptoms are relatively mild, she deferred FPRS consult and would like to start with Flonase. If she would like to pursue surgery we will refer her to Dr. Abad of facial plastics.  Follow-up as needed.     Jordon Borrero MD    [facility]  Rhinology  Endoscopic Skull Base Surgery  [facility]  Department of Otolaryngology - Head & Neck Surgery    Scribe Disclosure:  I, Bong Escobedo, am serving as a scribe to document services personally performed by Jordon Borrero MD at this visit, based upon the provider's statements to me. All documentation has been reviewed by the aforementioned provider prior to being entered into the official medical record.

## 2022-09-06 ENCOUNTER — DOCUMENTATION ONLY (OUTPATIENT)
Dept: SLEEP MEDICINE | Facility: CLINIC | Age: 42
End: 2022-09-06

## 2022-09-06 NOTE — PROGRESS NOTES
3 day Sleep therapy management telephone visit    Diagnostic AHI: 51.9 events per hour watch PAT    Confirmed with patient at time of call- N/A Patient is still interested in STM service       Message left for patient to return call    Order settings:  CPAP    8 cm H2O       Device settings:  CPAP EPR RESMED SOFT RESPONSE SETTING   8 cm  H20 TWO OFF       Compliance 16 %    Assessment: Nighty usage under four hours      Action plan: Patient to have 14 day STM visit. Patient has a follow up visit scheduled:   no and not required by insurance    Replacement device: No  STM ordered by provider: Yes     Total time spent on accessing and  interpreting remote patient PAP therapy data  10 minutes    Total time spent counseling, coaching  and reviewing PAP therapy data with patient  1 minutes    [id number] no

## 2022-09-07 ENCOUNTER — TELEPHONE (OUTPATIENT)
Dept: SLEEP MEDICINE | Facility: CLINIC | Age: 42
End: 2022-09-07

## 2022-09-07 DIAGNOSIS — G47.33 OBSTRUCTIVE SLEEP APNEA: Primary | ICD-10-CM

## 2022-09-07 NOTE — TELEPHONE ENCOUNTER
----- Message from Lesli Posadas sent at 9/6/2022  9:27 AM CDT -----  Regarding: FFM clarification  Hi Dr Chandra,  I sent through an order for a FFM as pt breaths through her mouth even when awake.  Can you please sign order for FFM?    Thank you,  Lesli WONG

## 2022-09-15 ENCOUNTER — DOCUMENTATION ONLY (OUTPATIENT)
Dept: SLEEP MEDICINE | Facility: CLINIC | Age: 42
End: 2022-09-15

## 2022-09-15 NOTE — PROGRESS NOTES
14  DAY STM VISIT    Diagnostic AHI: 51.9 events per hour watch PAT    Message left for patient to return call.    Assessment: Pt not meeting objective benchmarks for compliance       Action plan: waiting for patient to return call.  and pt to have 30 day STM visit.      Device type: CPAP    PAP settings: CPAP fixed 8.0 cm  H20      95th% pressure 8 cm  H20        RESMED EPR level Setting: TWO    RESMED Soft response setting:      Mask type:  Full Face Mask    Objective measures: 14 day rolling measures      Compliance  71 %      Leak  11.67  lpm  last  upload      AHI 1.35   last  upload      Average number of minutes 289      Objective measure goal  Compliance   Goal >70%  Leak   Goal < 24 lpm  AHI  Goal < 5  Usage  Goal >240        Total time spent on accessing and interpreting remote patient PAP therapy data  10 minutes    Total time spent counseling, coaching  and reviewing PAP therapy data with patient  1 minutes    [id number]  [id number]  no (3 day STM)

## 2022-09-19 ENCOUNTER — DOCUMENTATION ONLY (OUTPATIENT)
Dept: SLEEP MEDICINE | Facility: CLINIC | Age: 42
End: 2022-09-19

## 2022-09-19 NOTE — PROGRESS NOTES
Patient came to virtual set up via telephone visit for mask fitting appointment on September 19, 2022. Patient requested to switch masks because general discomfort . Discussed the following masks: AIRFIT F30I Patient selected a Resmed Mask name: F30I Full Face mask size Medium.  DID A 30 DAY EXCHANGE.

## 2022-09-20 ENCOUNTER — DOCUMENTATION ONLY (OUTPATIENT)
Dept: SLEEP MEDICINE | Facility: CLINIC | Age: 42
End: 2022-09-20

## 2022-10-05 ENCOUNTER — DOCUMENTATION ONLY (OUTPATIENT)
Dept: SLEEP MEDICINE | Facility: CLINIC | Age: 42
End: 2022-10-05

## 2022-10-05 NOTE — PROGRESS NOTES
30 DAY STM VISIT    Diagnostic AHI: 51.9 events per hour watch PAT    Data only recheck     Assessment: Pt meeting objective benchmarks.     Action plan: pt to have 6 month STM visit  Patient has scheduled a follow up visit with Dr. Chandra on 11/9/2022.   Device type: CPAP  PAP settings: CPAP fixed 8.0 cm  H20    95th% pressure 8 cm  H20      RESMED EPR level Setting: TWO    Mask type:  Full Face Mask  Objective measures: 14 day rolling measures      Compliance  100 %      Leak  8.72 lpm  last  upload      AHI 1.2   last  upload      Average number of minutes 461      Objective measure goal  Compliance   Goal >70%  Leak   Goal < 24 lpm  AHI  Goal < 5  Usage  Goal >240        Total time spent on accessing and interpreting remote patient PAP therapy data  10 minutes    Total time spent counseling, coaching  and reviewing PAP therapy data with patient  0 minutes     [id number] this call  [id number] no  at 3 or 14 day [facility]

## 2022-10-10 ENCOUNTER — OFFICE VISIT (OUTPATIENT)
Dept: DERMATOLOGY | Facility: CLINIC | Age: 42
End: 2022-10-10
Payer: COMMERCIAL

## 2022-10-10 DIAGNOSIS — L85.3 XEROSIS OF SKIN: Primary | ICD-10-CM

## 2022-10-10 DIAGNOSIS — R23.4 SKIN FISSURE: ICD-10-CM

## 2022-10-10 PROCEDURE — 99213 OFFICE O/P EST LOW 20 MIN: CPT | Performed by: DERMATOLOGY

## 2022-10-10 ASSESSMENT — PAIN SCALES - GENERAL: PAINLEVEL: NO PAIN (0)

## 2022-10-10 NOTE — NURSING NOTE
Chief Complaint   Patient presents with     Derm Problem     Pt here for cracks in heels and rough patch on R leg     Maximus Dean, EMT

## 2022-10-10 NOTE — PROGRESS NOTES
[facility] Dermatology Note  Encounter Date: Oct 10, 2022  Office Visit     Dermatology Problem List:  1. Skin cancer screening 3/14/22  2. Hand eczema, emollients, gentle skin cares  3. Heel cracking, bilateral  4. Xerosis, mild-to-moderate in BL LE  ____________________________________________    Assessment & Plan:    #Bilateral heel cracking, moderate  Fissure present on right heel without erythema or concern for infection at base. No palmar lesions. No history of DMI or DMII. Will trial urea cream 40% BID, if not covered by insurance, will proceed with urea cream 20% BID. Will consider podiatry referral if symptoms continue or worsen    #Xerosis with keratoses, mild-to-moderate in BL LE  Dry skin with upcoming winter months, will treat with amlactin. Provided samples of amlactin with coupon.    # Multiple pigmented nevi and solar lentigines,  ABCDs of melanoma were discussed and self skin checks were advised.   Sun precaution was advised including the use of sun screens of SPF 30 or higher, sun protective clothing, and avoidance of tanning beds.     # benign growths, cherry angiomas and seborrheic keratoses and Dermatofibroma on the left posterior shoulder  -Reassurance given.      # Family hx of non melanoma skin cancer in mother,  Recommend annual skin checks     Follow-up: in March 2023 for annual skin check or earlier for concerning lesions    Staff, Student and Scribe:     Makc Mcneal, MS4    Scribe Disclosure:  I, Demar Barrera, am serving as a scribe to document services personally performed by Nae Gatica MD based on data collection and the provider's statements to me.     Provider Disclosure:   The documentation recorded by the scribe accurately reflects the services I personally performed and the decisions made by me.    Staff Physician:  I was present with the medical student who participated in the service and in the documentation of the note. I have verified  the history and personally performed the physical exam and medical decision making. I agree with the assessment and plan of care as documented in the note.     Nae Gatica MD  Professor and Chair  Department of Dermatology  [facility]: Phone: [phone], Fax:[phone]  [facility]: Phone: [phone], Fax: [phone]        ____________________________________________    CC: heel cracking  HPI:  Ms. Karolina Herrera is a(n) 42 year old female who presents today as a return patient for bilateral heel cracking. Last seen in dermatology by Milvia Rodriguez PA-C, on 3/14/2022, at which time, no lesions of concern were noted.    Today, Karolina reports a several year history of bilateral heel cracking. It is worse in the dry, winter months. She uses an exfoliating tool, dry-heel specific socks on [employer], and OTC moisturizing lotions. She experiences fissures that bleed once every 2 months that are painful, but do not limit ambulation. No history of DMI or DMII or infections in feet.    She also reports 2 bumps on right knee that come and go. She has not tried anything to treat them. They are neither itchy or painful. Reports no symptoms on palms.    Patient is otherwise feeling well, without additional skin concerns.    Family History:  Mother with BCC    Labs Reviewed:  N/A    Physical Exam:  Vitals: There were no vitals taken for this visit.  SKIN: Focused examination of hands and lower extremities was performed.  - bilateral heels with moderate callus around base of heel. One fissure without erythema at base in right heel. No erythema, swelling, or ulcers on exam.  - diffuse, mild-to-moderate xerosis on bilateral lower extremities to knees. Right knee with two flesh-colored, dry keratoses  -minimal xerosis on radial and ulnar edges of palm  - No other lesions of concern on areas examined.      Medications:  Current Outpatient Medications   Medication     albuterol (PROAIR HFA/PROVENTIL HFA/VENTOLIN HFA) 108 (90 Base) MCG/ACT inhaler     LORazepam (ATIVAN) 1 MG tablet     Multiple Vitamins-Minerals (MULTIVITAMIN ADULT PO)     sertraline (ZOLOFT) 100 MG tablet     No current facility-administered medications for this visit.      Past Medical History:   Patient Active Problem List   Diagnosis     Family history of malignant neoplasm of breast     PCOS (polycystic ovarian syndrome)     Irritable bowel syndrome without diarrhea     BMI 40.0-44.9, adult (H)     ACP (advance care planning)     History of gestational diabetes     Vitamin D deficiency     Irritable bowel syndrome     Past Medical History:   Diagnosis Date     Abnormal vaginal bleeding 2008 and 2009     Amblyopia      At high risk for breast cancer      Encounter for gynecological examination without abnormal finding 10/19/2015    Overview:  Wife is Peyton.  Both are . Pap neg 6/10/ 2013: record in Care Everywhere from [facility]      Family history of malignant neoplasm of breast      High risk pregnancy, antepartum 3/30/2018    Overview:  Prenatal provider & planned delivery site: [facility] [place] [facility] Wife is Peyton.   IVF through [facility] with donor from sperm bank.  Same donor as first pregnancy with their son Huy.  Labs and records requested from [facility].   Initial prenatal bloodwork ___  GC/CT ___- These labs were deferred to wait for records from [facility].   Pregravid BMI >40.  Early GCT ____ UC done.  Pap up to date 7/17 Geneti     Low back pain 6/18/2014     Morbid obesity due to excess calories (H) 3/25/2016     Obesity with body mass index 30 or greater 12/31/2014        CC Referred Self, MD  No address on file on close of this encounter.

## 2022-10-10 NOTE — LETTER
10/10/2022       RE: Karolina Herrera  [address]  [place] [address]     Dear Colleague,    Thank you for referring your patient, Karolina Herrera, to the [facility]ERMAT[facility] [place] [facility]. Please see a copy of my visit note below.    [facility] Dermatology Note  Encounter Date: Oct 10, 2022  Office Visit     Dermatology Problem List:  1. Skin cancer screening 3/14/22  2. Hand eczema, emollients, gentle skin cares  3. Heel cracking, bilateral  4. Xerosis, mild-to-moderate in BL LE  ____________________________________________    Assessment & Plan:    #Bilateral heel cracking, moderate  Fissure present on right heel without erythema or concern for infection at base. No palmar lesions. No history of DMI or DMII. Will trial urea cream 40% BID, if not covered by insurance, will proceed with urea cream 20% BID. Will consider podiatry referral if symptoms continue or worsen    #Xerosis with keratoses, mild-to-moderate in BL LE  Dry skin with upcoming winter months, will treat with amlactin. Provided samples of amlactin with coupon.    # Multiple pigmented nevi and solar lentigines,  ABCDs of melanoma were discussed and self skin checks were advised.   Sun precaution was advised including the use of sun screens of SPF 30 or higher, sun protective clothing, and avoidance of tanning beds.     # benign growths, cherry angiomas and seborrheic keratoses and Dermatofibroma on the left posterior shoulder  -Reassurance given.      # Family hx of non melanoma skin cancer in mother,  Recommend annual skin checks     Follow-up: in March 2023 for annual skin check or earlier for concerning lesions    Staff, Student and Scribe:     Mack Mcneal, MS4    Scribe Disclosure:  I, Demar Barrera, am serving as a scribe to document services personally performed by Nae Gatica MD based on data collection and the  provider's statements to me.     Provider Disclosure:   The documentation recorded by the scribe accurately reflects the services I personally performed and the decisions made by me.    Staff Physician:  I was present with the medical student who participated in the service and in the documentation of the note. I have verified the history and personally performed the physical exam and medical decision making. I agree with the assessment and plan of care as documented in the note.     Nae Gatica MD  Professor and Chair  Department of Dermatology  [facility]: Phone: [phone], Fax:[phone]  [facility]: Phone: [phone], Fax: [phone]        ____________________________________________    CC: heel cracking  HPI:  Ms. Karolina Herrera is a(n) 42 year old female who presents today as a return patient for bilateral heel cracking. Last seen in dermatology by Milvia Rodriguez PA-C, on 3/14/2022, at which time, no lesions of concern were noted.    Today, Karolina reports a several year history of bilateral heel cracking. It is worse in the dry, winter months. She uses an exfoliating tool, dry-heel specific socks on [employer], and OTC moisturizing lotions. She experiences fissures that bleed once every 2 months that are painful, but do not limit ambulation. No history of DMI or DMII or infections in feet.    She also reports 2 bumps on right knee that come and go. She has not tried anything to treat them. They are neither itchy or painful. Reports no symptoms on palms.    Patient is otherwise feeling well, without additional skin concerns.    Family History:  Mother with BCC    Labs Reviewed:  N/A    Physical Exam:  Vitals: There were no vitals taken for this visit.  SKIN: Focused examination of hands and lower extremities was performed.  - bilateral heels with moderate callus around base of heel.  One fissure without erythema at base in right heel. No erythema, swelling, or ulcers on exam.  - diffuse, mild-to-moderate xerosis on bilateral lower extremities to knees. Right knee with two flesh-colored, dry keratoses  -minimal xerosis on radial and ulnar edges of palm  - No other lesions of concern on areas examined.     Medications:  Current Outpatient Medications   Medication     albuterol (PROAIR HFA/PROVENTIL HFA/VENTOLIN HFA) 108 (90 Base) MCG/ACT inhaler     LORazepam (ATIVAN) 1 MG tablet     Multiple Vitamins-Minerals (MULTIVITAMIN ADULT PO)     sertraline (ZOLOFT) 100 MG tablet     No current facility-administered medications for this visit.      Past Medical History:   Patient Active Problem List   Diagnosis     Family history of malignant neoplasm of breast     PCOS (polycystic ovarian syndrome)     Irritable bowel syndrome without diarrhea     BMI 40.0-44.9, adult (H)     ACP (advance care planning)     History of gestational diabetes     Vitamin D deficiency     Irritable bowel syndrome     Past Medical History:   Diagnosis Date     Abnormal vaginal bleeding 2008 and 2009     Amblyopia      At high risk for breast cancer      Encounter for gynecological examination without abnormal finding 10/19/2015    Overview:  Wife is Peyton.  Both are . Pap neg 6/10/ 2013: record in Care Everywhere from [facility]      Family history of malignant neoplasm of breast      High risk pregnancy, antepartum 3/30/2018    Overview:  Prenatal provider & planned delivery site: [facility] [place] [facility] Wife is Peyton.   IVF through [facility] with donor from sperm bank.  Same donor as first pregnancy with their son Huy.  Labs and records requested from [facility].   Initial prenatal bloodwork ___  GC/CT ___- These labs were deferred to wait for records from [facility].   Pregravid BMI >40.  Early GCT ____ UC done.  Pap up to date 7/17 Geneti     Low back pain 6/18/2014     Morbid obesity due to excess calories (H) 3/25/2016     Obesity with body  mass index 30 or greater 12/31/2014        CC Referred Self, MD  No address on file on close of this encounter.      Again, thank you for allowing me to participate in the care of your patient.      Sincerely,    Nae Gatica MD

## 2022-10-23 ENCOUNTER — MYC MEDICAL ADVICE (OUTPATIENT)
Dept: FAMILY MEDICINE | Facility: CLINIC | Age: 42
End: 2022-10-23

## 2022-10-23 DIAGNOSIS — N76.0 VAGINITIS AND VULVOVAGINITIS: ICD-10-CM

## 2022-10-23 DIAGNOSIS — Z13.0 SCREENING, ANEMIA, DEFICIENCY, IRON: ICD-10-CM

## 2022-10-23 DIAGNOSIS — Z13.1 SCREENING FOR DIABETES MELLITUS: Primary | ICD-10-CM

## 2022-10-23 DIAGNOSIS — R53.83 OTHER FATIGUE: ICD-10-CM

## 2022-10-24 ENCOUNTER — LAB (OUTPATIENT)
Dept: LAB | Facility: CLINIC | Age: 42
End: 2022-10-24
Payer: COMMERCIAL

## 2022-10-24 DIAGNOSIS — R53.83 OTHER FATIGUE: ICD-10-CM

## 2022-10-24 DIAGNOSIS — Z13.0 SCREENING, ANEMIA, DEFICIENCY, IRON: ICD-10-CM

## 2022-10-24 DIAGNOSIS — N76.0 VAGINITIS AND VULVOVAGINITIS: ICD-10-CM

## 2022-10-24 DIAGNOSIS — Z13.1 SCREENING FOR DIABETES MELLITUS: ICD-10-CM

## 2022-10-24 LAB
ALBUMIN SERPL-MCNC: 3.8 G/DL (ref 3.4–5)
ALP SERPL-CCNC: 80 U/L (ref 40–150)
ALT SERPL W P-5'-P-CCNC: 77 U/L (ref 0–50)
ANION GAP SERPL CALCULATED.3IONS-SCNC: 4 MMOL/L (ref 3–14)
AST SERPL W P-5'-P-CCNC: 54 U/L (ref 0–45)
BASOPHILS # BLD AUTO: 0 10E3/UL (ref 0–0.2)
BASOPHILS NFR BLD AUTO: 0 %
BILIRUB SERPL-MCNC: 0.6 MG/DL (ref 0.2–1.3)
BUN SERPL-MCNC: 14 MG/DL (ref 7–30)
CALCIUM SERPL-MCNC: 9.4 MG/DL (ref 8.5–10.1)
CHLORIDE BLD-SCNC: 107 MMOL/L (ref 94–109)
CLUE CELLS: ABNORMAL
CO2 SERPL-SCNC: 27 MMOL/L (ref 20–32)
CREAT SERPL-MCNC: 0.6 MG/DL (ref 0.52–1.04)
EOSINOPHIL # BLD AUTO: 0.2 10E3/UL (ref 0–0.7)
EOSINOPHIL NFR BLD AUTO: 2 %
ERYTHROCYTE [DISTWIDTH] IN BLOOD BY AUTOMATED COUNT: 14.2 % (ref 10–15)
GFR SERPL CREATININE-BSD FRML MDRD: >90 ML/MIN/1.73M2
GLUCOSE BLD-MCNC: 92 MG/DL (ref 70–99)
HBA1C MFR BLD: 5.6 % (ref 0–5.6)
HCT VFR BLD AUTO: 40.3 % (ref 35–47)
HGB BLD-MCNC: 13.2 G/DL (ref 11.7–15.7)
LYMPHOCYTES # BLD AUTO: 3 10E3/UL (ref 0.8–5.3)
LYMPHOCYTES NFR BLD AUTO: 33 %
MCH RBC QN AUTO: 28 PG (ref 26.5–33)
MCHC RBC AUTO-ENTMCNC: 32.8 G/DL (ref 31.5–36.5)
MCV RBC AUTO: 85 FL (ref 78–100)
MONOCYTES # BLD AUTO: 0.6 10E3/UL (ref 0–1.3)
MONOCYTES NFR BLD AUTO: 6 %
NEUTROPHILS # BLD AUTO: 5.2 10E3/UL (ref 1.6–8.3)
NEUTROPHILS NFR BLD AUTO: 58 %
PLATELET # BLD AUTO: 269 10E3/UL (ref 150–450)
POTASSIUM BLD-SCNC: 4.2 MMOL/L (ref 3.4–5.3)
PROT SERPL-MCNC: 7.7 G/DL (ref 6.8–8.8)
RBC # BLD AUTO: 4.72 10E6/UL (ref 3.8–5.2)
SODIUM SERPL-SCNC: 138 MMOL/L (ref 133–144)
TRICHOMONAS, WET PREP: ABNORMAL
WBC # BLD AUTO: 9 10E3/UL (ref 4–11)
WBC'S/HIGH POWER FIELD, WET PREP: ABNORMAL
YEAST, WET PREP: ABNORMAL

## 2022-10-24 PROCEDURE — 85025 COMPLETE CBC W/AUTO DIFF WBC: CPT

## 2022-10-24 PROCEDURE — 87210 SMEAR WET MOUNT SALINE/INK: CPT

## 2022-10-24 PROCEDURE — 80053 COMPREHEN METABOLIC PANEL: CPT

## 2022-10-24 PROCEDURE — 36415 COLL VENOUS BLD VENIPUNCTURE: CPT

## 2022-10-24 PROCEDURE — 83036 HEMOGLOBIN GLYCOSYLATED A1C: CPT

## 2022-11-03 ENCOUNTER — MYC MEDICAL ADVICE (OUTPATIENT)
Dept: DERMATOLOGY | Facility: CLINIC | Age: 42
End: 2022-11-03

## 2022-11-07 NOTE — PROGRESS NOTES
[facility] Dermato-allergology Note  Office visit  Encounter Date: Nov 8, 2022  ____________________________________________    CC: No chief complaint on file.      HPI:  (Nov 8, 2022)  Ms. Karolina Herrera is a(n) 42 year old female who presents today as new patient for allergy consultation  -Symptoms started several years ago   -Sore throat, runny nose, sore through particularly in the spring and fall  -Has transitioned to year round about 2-3 years ago   -Patient reports using Claritin, Zyrtec for symptom managment; current Claritin 1x day    -Otherwise feeling well in usual state of health    Physical exam:  General: In no acute distress, well-developed, well-nourished  Eyes: no conjunctivitis  ENT: congestion, rhinitis   Pulmonary: coughing  Skin: Focused examination of the skin on test sites was performed = see test results below      Past Medical History:   Patient Active Problem List   Diagnosis     Family history of malignant neoplasm of breast     PCOS (polycystic ovarian syndrome)     Irritable bowel syndrome without diarrhea     BMI 40.0-44.9, adult (H)     ACP (advance care planning)     History of gestational diabetes     Vitamin D deficiency     Irritable bowel syndrome     Past Medical History:   Diagnosis Date     Abnormal vaginal bleeding 2008 and 2009     Amblyopia      At high risk for breast cancer      Encounter for gynecological examination without abnormal finding 10/19/2015    Overview:  Wife is Peyton.  Both are . Pap neg 6/10/ 2013: record in Care Everywhere from [place]      Family history of malignant neoplasm of breast      High risk pregnancy, antepartum 3/30/2018    Overview:  Prenatal provider & planned delivery site: [facility] [place] [facility] Wife is Peyton.   IVF through [place] with donor from sperm bank.  Same donor as first pregnancy with their son Huy.  Labs and records requested from [place].   Initial prenatal bloodwork ___  GC/CT ___- These labs were deferred to  wait for records from [place].   Pregravid BMI >40.  Early GCT ____ UC done.  Pap up to date  Geneti     Low back pain 2014     Morbid obesity due to excess calories (H) 3/25/2016     Obesity with body mass index 30 or greater 2014       Allergies:  No Known Allergies    Medications:  Current Outpatient Medications   Medication     albuterol (PROAIR HFA/PROVENTIL HFA/VENTOLIN HFA) 108 (90 Base) MCG/ACT inhaler     LORazepam (ATIVAN) 1 MG tablet     Multiple Vitamins-Minerals (MULTIVITAMIN ADULT PO)     sertraline (ZOLOFT) 100 MG tablet     No current facility-administered medications for this visit.       Social History:  The patient  for the [employer]. Patient has the following hobbies or non-occupational exposure: take care of kids, hiking, biking.    Family History:  Family History   Problem Relation Age of Onset     Skin Cancer Mother      Glaucoma Father      Esophageal Cancer Maternal Grandfather          in 60s; hx of smoking     Bladder Cancer Paternal Grandfather          in 80s     Breast Cancer Sister 39        invasive ductal carcinoma, treated with lumpectomy, radiation and tamoxifen     Breast Cancer Paternal Aunt         paternal great aunt in 70s     Breast Cancer Sister         Age 39 at diagnosis     Macular Degeneration No family hx of        Previous Labs, Allergy Tests, Dermatopathology, Imaging:  Past blood work done for food sensitivity testing. No history of prick or patch testing.    Referred By: MIKEL Maurer CNP  [address]  [place] [address]     Allergy Tests:    Past Allergy Test    Order for Future Allergy Testing:    [x] Outpatient  [] Inpatient: Au..../ Bed ....       Skin Atopy (atopic dermatitis) [] Yes   [x] No .........  Contact allergies:   [x] Yes   [] No ..........Metal, band aid adhesives, deodorant   Hand eczema:   [] Yes   [x] No but pronounced dryness           Leading hand:   [] R   [] L       []  Ambidextrous         Drug allergies:        [] Yes   [x] No  which?......  Urticaria/Angioedema  [] Yes   [x] No .........  Food Allergy:  [] Yes   [x] No  Which?......garlic sensitivity; GI upset  Pets :  [x] Yes   [] No  Which?..dog at home        [x]  Rhinitis   [] Conjunctivitis   [x] Sinusitis   [] Polyposis   [x] Otitis   [x] Pharyngitis         [x]  Postnasal drip    []  none  Operations:   [] Tonsils   [] Septum   [] Sinus   [] Polyposis         [] Asthma bronchiale   [] Coughing      [x]  none  Symptoms (mostly Rhinoconjunctivitis and Asthma) aggravated by:  Season   [] I   [] II   [x] III   [] IV   []V   []VI   []VII   []VIII   [x]IX   []X   []XI   []XII     [x] perennial   Day time      [x] morning   [] noon      [x] evening        [] night    [] whole day........  []  none  Location/changes    [] inside        [x] outside   [] mountains    [] sea     [] others.............   []  none  Triggers, specific     [] animals     [] plants     [] dust              [] others ...........................    [x]  none  Triggers, others       [] work          [] psyche    [] sport            [] others .............................  [x]  none  Irritant                [x] phys efforts [] smoke    [] heat/cold     [] odors  []others............... []  none    Order for PATCH TESTS  Reason for tests (suspected allergy): maybe contact allergy to metals, deodorant or adhesives?? Allergic vs irritant --> not yet necessary  Known previous allergies: None   Standardized panels  [] Standard panel (40 tests)  [] Preservatives & Antimicrobials (31 tests)  [] Emulsifiers & Additives (25 tests)   [] Perfumes/Flavours & Plants (25 tests)  [] Hairdresser panel (12 tests)  [] Rubber Chemicals (22 tests)  [] Plastics (26 tests)  [] Colorants/Dyes/Food additives (20 tests)  [] Metals (implants/dental) (24 tests)  [] Local anaesthetics/NSAIDs (13 tests)  [] Antibiotics & Antimycotics (14 tests)   [] Corticosteroids (15 tests)   []  Photopatch test (62 tests)   [] others: ...      [] Patient's own products: ...    DO NOT test if chemical or biological identity is unknown!     always ask from patient the product information and safety sheets (MSDS)       Order for PRICK TESTS    Reason for tests (suspected allergy): rhinosinusitis perennial with seasonal aggravation  Known previous allergies: none    Standardized prick panels  [x] Atopic panel (20 tests)  [] Pediatric Panel (12 tests)  [] Milk, Meat, Eggs, Grains (20 tests)   [] Dust, Epithelia, Feathers (10 tests)  [] Fish, Seafood, Shellfish (17 tests)  [] Nuts, Beans (14 tests)  [] Spice, Vegetable, Fruit (17 tests)  [] Pollen Panel = Tree, Grass, Weed (24 tests)  [] Others: ...      [] Patient's own products: ...    DO NOT test if chemical or biological identity is unknown!     always ask from patient the product information and safety sheets (MSDS)     Standardized intradermal tests  [] Penicillium notatum [] Aspergillus fumigatus [] House dust mites D.far & D. pteron  [] Cat    [] dog  [] Others: ...  [] Bee venom   [] Wasp venom  !!Specific protocol with dilutions!!       Order for Drug allergy tests (prick & Intradermal & patch tests)    [] Penicillin G  [] Ampicillin [] Cefazolin   [] Ceftriaxone   [] Ceftazidime  [] Bactrim    [] Others: ...  Order for ... as test date    Atopy Screen (Placed Nov 8, 2022)  No Substance Readings (15 min) Evaluation   POS Histamine 1mg/ml ++    NEG NaCl 0.9% -      No Substance Readings (15 min) Evaluation   1 Alternaria alternata (tenuis)  -    2 Cladosporium herbarum -    3 Aspergillus fumigatus -    4 Penicillium notatum -    5 Dermatophagoides pteronyssinus -    6 Dermatophagoides farinae -    7 Dog epithelium (canis spp) -    8 Cat hair (emily catus) -    9 Cockroach   (Blatella americana & germanica) -    10 Grass mix midwest   (Prabha, Orchard, Redtop, Ruddy) -    11 Alexey grass (sorghum halepense) -    12 Weed mix   (common Cocklebur, Guerra s  quarters, rough redroot Pigweed, Dock/Sorrel) -    13 Mug wort (artemisia vulgare) -    14 Ragweed giant/short (ambrosia spp) -    15 White birch (Betula papyrifera) -    16 Tree mix 1 (Pecan, Maple BHR, Oak RVW, american [place], black [place]) -    17 Red cedar (juniperus virginia) -    18 Tree mix 2   (white Stan, river/red Birch, black [place], common [place], american Elm) -    19 Box elder/Maple mix (acer spp) -    20 Cherry shagbark (carya ovata) -           Conclusion      Intradermal Testing (Placed Nov 8, 2022)  No Substance Conc.  Reading (15min)  immediate Papule [mm] / Erythema [mm] Reading   (... days)  delayed Papule [mm] / Erythema [mm] Remarks   DF Standard Dust Mite - D. Farinae 1:10 ++ 10/15  -    DP Standard Dust Mite - D. Pteronyssinus 1:10 ++ 10/15  -    A Aspergillus fumigatus  1:10 -   -    P Penicillium notatum 1:10 -   -     Alternaria alt 1:10 -   -     Dog epithelium 1:10 -   -    Conclusions: immediate type sensitization to house dust mites. Patient will feed back if any delayed reaction    ________________________________    Assessment & Plan:    ==> Final Diagnosis:     #Suspicion for atopic predisposition with     Perennial Rhinosinusitis, postnasal drip, pharyngitis and otitis with exacerbation in spring and fall with clear sensitization to house dust mites in intradermal tests    Xerosis cutis    Nickel allergy and irritant dermatitis  * chronic illness with exacerbation, progression, side effects from treatment     These conclusions are made at the best of one's knowledge and belief based on the provided evidence such as patient's history and allergy test results and they can change over time or can be incomplete because of missing information's.    ==> Treatment Plan:  >> info given HDM reduction  >> at bedtime Claritine (mabye as well in the morning)  >> Nasonex nasal spray at bedtime    Procedures Performed: Allergy tests, including prick, intradermal tests    Roxie Narvaez  MS3    Staff Physician Comments:  I was present with the medical student who participated in the service and in the documentation of the note. I have verified the history and personally performed the physical exam and medical decision making. I agree with the assessment and plan as documented in the note. I have reviewed and if necessary amended the note.      Harjit Vasquez MD  Professor  Head of Dermato-Allergy Division  Department of Dermatology  [facility]    Staff and Medical Student:  Provider    Follow-up in Derm-Allergy clinic in about 2 months    I spent a total of 38 minutes with Karolina Herrera. This time was spent counseling the patient and/or coordinating care, explaining the allergy tests, performing allergy tests and assessing the clinical relevance.

## 2022-11-08 ENCOUNTER — OFFICE VISIT (OUTPATIENT)
Dept: ALLERGY | Facility: CLINIC | Age: 42
End: 2022-11-08
Attending: NURSE PRACTITIONER
Payer: COMMERCIAL

## 2022-11-08 ENCOUNTER — PRE VISIT (OUTPATIENT)
Dept: ALLERGY | Facility: CLINIC | Age: 42
End: 2022-11-08

## 2022-11-08 DIAGNOSIS — R09.82 ALLERGIC RHINITIS WITH POSTNASAL DRIP: ICD-10-CM

## 2022-11-08 DIAGNOSIS — J30.89 NON-SEASONAL ALLERGIC RHINITIS DUE TO OTHER ALLERGIC TRIGGER: Primary | ICD-10-CM

## 2022-11-08 DIAGNOSIS — J30.9 ALLERGIC RHINITIS WITH POSTNASAL DRIP: ICD-10-CM

## 2022-11-08 DIAGNOSIS — R09.81 NASAL CONGESTION: ICD-10-CM

## 2022-11-08 DIAGNOSIS — Z91.09 HOUSE DUST MITE ALLERGY: ICD-10-CM

## 2022-11-08 PROCEDURE — 95024 IQ TESTS W/ALLERGENIC XTRCS: CPT | Performed by: DERMATOLOGY

## 2022-11-08 PROCEDURE — 99214 OFFICE O/P EST MOD 30 MIN: CPT | Mod: 25 | Performed by: DERMATOLOGY

## 2022-11-08 PROCEDURE — 95004 PERQ TESTS W/ALRGNC XTRCS: CPT | Performed by: DERMATOLOGY

## 2022-11-08 RX ORDER — MOMETASONE FUROATE MONOHYDRATE 50 UG/1
2 SPRAY, METERED NASAL AT BEDTIME
Qty: 17 G | Refills: 3 | Status: SHIPPED | OUTPATIENT
Start: 2022-11-08 | End: 2022-12-06

## 2022-11-08 NOTE — Clinical Note
11/8/2022         RE: Karloina Herrera  [address]  [facility] [id number]        Dear Colleague,    Thank you for referring your patient, Karolina Herrera, to the [facility] [place]. Please see a copy of my visit note below.    [facility] Dermato-allergology Note  Office visit  Encounter Date: Nov 8, 2022  ____________________________________________    CC: No chief complaint on file.      HPI:  (Nov 8, 2022)  Ms. Karolina Herrera is a(n) 42 year old female who presents today as new patient for allergy consultation  -Symptoms started several years ago   -Sore throat, runny nose, sore through particularly in the spring and fall  -Has transitioned to year round about 2-3 years ago   -Patient reports using Claritin, Zyrtec for symptom managment; current Claritin 1x day    -Otherwise feeling well in usual state of health    Physical exam:  General: In no acute distress, well-developed, well-nourished  Eyes: no conjunctivitis  ENT: congestion, rhinitis   Pulmonary: coughing  Skin: Focused examination of the skin on test sites was performed = see test results below      Past Medical History:   Patient Active Problem List   Diagnosis     Family history of malignant neoplasm of breast     PCOS (polycystic ovarian syndrome)     Irritable bowel syndrome without diarrhea     BMI 40.0-44.9, adult (H)     ACP (advance care planning)     History of gestational diabetes     Vitamin D deficiency     Irritable bowel syndrome     Past Medical History:   Diagnosis Date     Abnormal vaginal bleeding 2008 and 2009     Amblyopia      At high risk for breast cancer      Encounter for gynecological examination without abnormal finding 10/19/2015    Overview:  Wife is Peyton.  Both are . Pap neg 6/10/ 2013: record in Care Everywhere from [place]      Family history of malignant neoplasm of breast      High risk pregnancy, antepartum 3/30/2018    Overview:  Prenatal provider &  planned delivery site: [facility] [place] [facility] Wife is Peyton.   IVF through [place] with donor from sperm bank.  Same donor as first pregnancy with their son Huy.  Labs and records requested from [place].   Initial prenatal bloodwork ___  GC/CT ___- These labs were deferred to wait for records from [place].   Pregravid BMI >40.  Early GCT ____ UC done.  Pap up to date  Geneti     Low back pain 2014     Morbid obesity due to excess calories (H) 3/25/2016     Obesity with body mass index 30 or greater 2014       Allergies:  No Known Allergies    Medications:  Current Outpatient Medications   Medication     albuterol (PROAIR HFA/PROVENTIL HFA/VENTOLIN HFA) 108 (90 Base) MCG/ACT inhaler     LORazepam (ATIVAN) 1 MG tablet     Multiple Vitamins-Minerals (MULTIVITAMIN ADULT PO)     sertraline (ZOLOFT) 100 MG tablet     No current facility-administered medications for this visit.       Social History:  The patient  for the [employer]. Patient has the following hobbies or non-occupational exposure: take care of kids, hiking, biking.    Family History:  Family History   Problem Relation Age of Onset     Skin Cancer Mother      Glaucoma Father      Esophageal Cancer Maternal Grandfather          in 60s; hx of smoking     Bladder Cancer Paternal Grandfather          in 80s     Breast Cancer Sister 39        invasive ductal carcinoma, treated with lumpectomy, radiation and tamoxifen     Breast Cancer Paternal Aunt         paternal great aunt in 70s     Breast Cancer Sister         Age 39 at diagnosis     Macular Degeneration No family hx of        Previous Labs, Allergy Tests, Dermatopathology, Imaging:  Past blood work done for food sensitivity testing. No history of prick or patch testing.    Referred By: Kim Urbina, MIKEL CNP  [address]  [place] [address]     Allergy Tests:    Past Allergy Test    Order for Future Allergy Testing:    [x] Outpatient  [] Inpatient: Au..../  Bed ....       Skin Atopy (atopic dermatitis) [] Yes   [x] No .........  Contact allergies:   [x] Yes   [] No ..........Metal, band aid adhesives, deodorant   Hand eczema:   [] Yes   [x] No but pronounced dryness           Leading hand:   [] R   [] L       [] Ambidextrous         Drug allergies:        [] Yes   [x] No  which?......  Urticaria/Angioedema  [] Yes   [x] No .........  Food Allergy:  [] Yes   [x] No  Which?......garlic sensitivity; GI upset  Pets :  [x] Yes   [] No  Which?..dog at home        [x]  Rhinitis   [] Conjunctivitis   [x] Sinusitis   [] Polyposis   [x] Otitis   [x] Pharyngitis         [x]  Postnasal drip    []  none  Operations:   [] Tonsils   [] Septum   [] Sinus   [] Polyposis         [] Asthma bronchiale   [] Coughing      [x]  none  Symptoms (mostly Rhinoconjunctivitis and Asthma) aggravated by:  Season   [] I   [] II   [x] III   [] IV   []V   []VI   []VII   []VIII   [x]IX   []X   []XI   []XII     [x] perennial   Day time      [x] morning   [] noon      [x] evening        [] night    [] whole day........  []  none  Location/changes    [] inside        [x] outside   [] mountains    [] sea     [] others.............   []  none  Triggers, specific     [] animals     [] plants     [] dust              [] others ...........................    [x]  none  Triggers, others       [] work          [] psyche    [] sport            [] others .............................  [x]  none  Irritant                [x] phys efforts [] smoke    [] heat/cold     [] odors  []others............... []  none    Order for PATCH TESTS  Reason for tests (suspected allergy): maybe contact allergy to metals, deodorant or adhesives?? Allergic vs irritant --> not yet necessary  Known previous allergies: None   Standardized panels  [] Standard panel (40 tests)  [] Preservatives & Antimicrobials (31 tests)  [] Emulsifiers & Additives (25 tests)   [] Perfumes/Flavours & Plants (25 tests)  [] Hairdresser panel (12 tests)  []  Rubber Chemicals (22 tests)  [] Plastics (26 tests)  [] Colorants/Dyes/Food additives (20 tests)  [] Metals (implants/dental) (24 tests)  [] Local anaesthetics/NSAIDs (13 tests)  [] Antibiotics & Antimycotics (14 tests)   [] Corticosteroids (15 tests)   [] Photopatch test (62 tests)   [] others: ...      [] Patient's own products: ...    DO NOT test if chemical or biological identity is unknown!     always ask from patient the product information and safety sheets (MSDS)       Order for PRICK TESTS    Reason for tests (suspected allergy): rhinosinusitis perennial with seasonal aggravation  Known previous allergies: none    Standardized prick panels  [x] Atopic panel (20 tests)  [] Pediatric Panel (12 tests)  [] Milk, Meat, Eggs, Grains (20 tests)   [] Dust, Epithelia, Feathers (10 tests)  [] Fish, Seafood, Shellfish (17 tests)  [] Nuts, Beans (14 tests)  [] Spice, Vegetable, Fruit (17 tests)  [] Pollen Panel = Tree, Grass, Weed (24 tests)  [] Others: ...      [] Patient's own products: ...    DO NOT test if chemical or biological identity is unknown!     always ask from patient the product information and safety sheets (MSDS)     Standardized intradermal tests  [] Penicillium notatum [] Aspergillus fumigatus [] House dust mites D.far & D. pteron  [] Cat    [] dog  [] Others: ...  [] Bee venom   [] Wasp venom  !!Specific protocol with dilutions!!       Order for Drug allergy tests (prick & Intradermal & *** patch tests)    [] Penicillin G  [] Ampicillin [] Cefazolin   [] Ceftriaxone   [] Ceftazidime  [] Bactrim    [] Others: ...  Order for ... as test date    Atopy Screen (Placed Nov 8, 2022)  No Substance Readings (15 min) Evaluation   POS Histamine 1mg/ml ++    NEG NaCl 0.9% -      No Substance Readings (15 min) Evaluation   1 Alternaria alternata (tenuis)  -    2 Cladosporium herbarum -    3 Aspergillus fumigatus -    4 Penicillium notatum -    5 Dermatophagoides pteronyssinus -    6 Dermatophagoides farinae -     7 Dog epithelium (canis spp) -    8 Cat hair (emily catus) -    9 Cockroach   (Blatella americana & germanica) -    10 Grass mix midwest   (Prabha, Orchard, Redtop, Ruddy) -    11 Alexey grass (sorghum halepense) -    12 Weed mix   (common Cocklebur, Lamb s quarters, rough redroot Pigweed, Dock/Sorrel) -    13 Mug wort (artemisia vulgare) -    14 Ragweed giant/short (ambrosia spp) -    15 White birch (Betula papyrifera) -    16 Tree mix 1 (Pecan, Maple BHR, Oak RVW, american [place], black [place]) -    17 Red cedar (juniperus virginia) -    18 Tree mix 2   (white [place], river/red Birch, black [place], common [place], american Elm) -    19 Box elder/Maple mix (acer spp) -    20 [place] shagbark (carya ovata) -           Conclusion      Intradermal Testing (Placed Nov 8, 2022)  No Substance Conc.  Reading (15min)  immediate Papule [mm] / Erythema [mm] Reading   (... days)  delayed Papule [mm] / Erythema [mm] Remarks   DF Standard Dust Mite - D. Farinae 1:10 ++ 10/15  -    DP Standard Dust Mite - D. Pteronyssinus 1:10 ++ 10/15  -    A Aspergillus fumigatus  1:10 -   -    P Penicillium notatum 1:10 -   -     Alternaria alt 1:10 -   -     Dog epithelium 1:10 -   -    Conclusions: immediate type sensitization to house dust mites. Patient will feed back if any delayed reaction    ________________________________    Assessment & Plan:    ==> Final Diagnosis:     #Suspicion for atopic predisposition with     Perennial Rhinosinusitis, postnasal drip, pharyngitis and otitis with exacerbation in spring and fall with clear sensitization to house dust mites in intradermal tests    Xerosis cutis    Nickel allergy and irritant dermatitis  * chronic illness with exacerbation, progression, side effects from treatment     These conclusions are made at the best of one's knowledge and belief based on the provided evidence such as patient's history and allergy test results and they can change over time or can be incomplete because of  missing information's.    ==> Treatment Plan:  >> info given HDM reduction  >> at bedtime Claritine (mabye as well in the morning)  >> Nasonex nasal spray at bedtime    Procedures Performed: Allergy tests, including prick, intradermal and patch tests, drug allergy or provocation tests***    Roxie Narvaez, MS3    Staff Physician Comments:  I was present with the medical student who participated in the service and in the documentation of the note. I have verified the history and personally performed the physical exam and medical decision making. I agree with the assessment and plan as documented in the note. I have reviewed and if necessary amended the note.      Harjit Vasquez MD  Professor  Head of Dermato-Allergy Division  Department of Dermatology  [facility]    Staff and Medical Student:  Provider    Follow-up in Derm-Allergy clinic in about 2 months    I spent a total of 38 minutes with Karolina Herrera. This time was spent counseling the patient and/or coordinating care, explaining the allergy tests, performing allergy tests and assessing the clinical relevance.        Again, thank you for allowing me to participate in the care of your patient.        Sincerely,        Harjit Vasquez MD

## 2022-11-08 NOTE — PATIENT INSTRUCTIONS
House Dust Mite Allergy        The house dust mite is an arachnid about 0.3 mm in size and not visible to the naked eye. There are around 150 species of house dust mites in the world. One mite produces up to 40 fecal droppings a day. One teaspoonful of bedroom dust contains an average of nearly 1000 mites and 250,000 minute droppings.    Causes and triggers of house dust mite allergy  The house dust mite requires a warm, moist environment without light in order to live and reproduce. Our beds are ideal. The mite feeds on human and animal skin scales. The allergen is mainly contained in the mite's feces. The feces contain allergy-triggering constituents which are spread in fine dust, are breathed in and can cause an allergic reaction.    Symptoms  When the allergens come into contact with the mucous membranes in the eyes, nose, mouth and throat, sufferers develop symptoms typical of an allergic cold (allergic rhinitis) or an allergic inflammation of the conjunctiva (allergic conjunctivitis): blocked or runny nose, sneezing, red, itchy eyes. If all of these symptoms are present, then the condition is also known as rhinoconjunctivitis. Often, the upper respiratory tract becomes chronically inflamed, primarily because house dust mites are present all year round.  The symptoms of house dust mite allergy typically occur in the morning and are more frequent in the cold months of the year.    Therapy and treatment  As a first step, mattress, pillows and duvet/comforter should be placed in mite-proof or anti-mite covers, sometimes known as encasings. Alternatively you can use pillows or comforter that can be washed at over 130 F monthly. At the same time, house dust should be minimized. If necessary, the symptoms can be treated with medication, for example antihistamines in the form of nasal sprays, eye drops and tablets. Desensitization/specific immunotherapy (SIT) is recommended for house dust allergy if all the measures  "above are not sufficient.    Tips and tricks:  Keep room temperature at 66-70 F and relative air humidity at a maximum of 50%.  Ideally, thoroughly air your home two to three times a day for 5 to 10 minutes each time.  Wash bed linens in at least 130 F every week.  Remove stuffed animals or freeze them every other week.  Keep ceiling fans off in the bedroom as they can stir up dust mite allergens.  Remove dust from furniture with a damp cloth and regularly wet mop floors.  Do not put pot and hydroponic plants in the bedroom and also avoid putting too many in living areas, as they increase room humidity.  When staying overnight in other accommodations, we recommend taking your own bed linen and the above anti-mite mattress covers with you.  Remove upholstered furniture from the bedroom and consider removing the carpets. Ideally, use sealed parquet or laminate loki, cork tiles or loki made of wood, novilon or PVC.  Maybe additionally reduce dust mites in mattress, upholstery, or loki using hot steam .      Modified from \"House Dust Mite Allergy\" by [employer]! [facility].   "

## 2022-11-09 ENCOUNTER — DOCUMENTATION ONLY (OUTPATIENT)
Dept: SLEEP MEDICINE | Facility: CLINIC | Age: 42
End: 2022-11-09

## 2022-11-09 ENCOUNTER — VIRTUAL VISIT (OUTPATIENT)
Dept: SLEEP MEDICINE | Facility: CLINIC | Age: 42
End: 2022-11-09
Payer: COMMERCIAL

## 2022-11-09 VITALS — HEIGHT: 67 IN | WEIGHT: 290 LBS | BODY MASS INDEX: 45.52 KG/M2

## 2022-11-09 DIAGNOSIS — R79.9 ABNORMAL BLOOD CHEMISTRY: ICD-10-CM

## 2022-11-09 DIAGNOSIS — G47.33 OBSTRUCTIVE SLEEP APNEA: Primary | ICD-10-CM

## 2022-11-09 PROCEDURE — 99214 OFFICE O/P EST MOD 30 MIN: CPT | Mod: GT | Performed by: INTERNAL MEDICINE

## 2022-11-09 ASSESSMENT — SLEEP AND FATIGUE QUESTIONNAIRES
HOW LIKELY ARE YOU TO NOD OFF OR FALL ASLEEP WHILE SITTING AND TALKING TO SOMEONE: WOULD NEVER DOZE
HOW LIKELY ARE YOU TO NOD OFF OR FALL ASLEEP WHEN YOU ARE A PASSENGER IN A CAR FOR AN HOUR WITHOUT A BREAK: WOULD NEVER DOZE
HOW LIKELY ARE YOU TO NOD OFF OR FALL ASLEEP WHILE SITTING INACTIVE IN A PUBLIC PLACE: WOULD NEVER DOZE
HOW LIKELY ARE YOU TO NOD OFF OR FALL ASLEEP IN A CAR, WHILE STOPPED FOR A FEW MINUTES IN TRAFFIC: WOULD NEVER DOZE
HOW LIKELY ARE YOU TO NOD OFF OR FALL ASLEEP WHILE SITTING QUIETLY AFTER LUNCH WITHOUT ALCOHOL: SLIGHT CHANCE OF DOZING
HOW LIKELY ARE YOU TO NOD OFF OR FALL ASLEEP WHILE SITTING AND READING: MODERATE CHANCE OF DOZING
HOW LIKELY ARE YOU TO NOD OFF OR FALL ASLEEP WHILE WATCHING TV: SLIGHT CHANCE OF DOZING
HOW LIKELY ARE YOU TO NOD OFF OR FALL ASLEEP WHILE LYING DOWN TO REST IN THE AFTERNOON WHEN CIRCUMSTANCES PERMIT: MODERATE CHANCE OF DOZING

## 2022-11-09 NOTE — PROGRESS NOTES
PT EMAILED ABOUT SUPPLIES. EMAILED BACK LETTING HER KNOW SHE CAN GET A CUSHION AT THIS TIME OR WAIT TILL 12/1 AND GET MASK FRAME, 3 MO SUPPLY CUSHIONS, FILTERS AND TUBING.

## 2022-11-09 NOTE — PROGRESS NOTES
Karolina is a 42 year old who is being evaluated via a billable video visit.      How would you like to obtain your AVS? MyChart  If the video visit is dropped, the invitation should be resent by: Send to e-mail at: [email or web address]  Will anyone else be joining your video visit? Keesha  Mary Muller    Video-Visit Details    Video Start Time: 10:58 AM    Type of service:  Video Visit    Video End Time:11:03 AM    Originating Location (pt. Location): Home    Distant Location (provider location):  Off-site    Platform used for Video Visit: [email or web address]     Additional 10 minutes on the date of service was spent performing the following:    -Preparing to see the patient  -Ordering medications, tests, or procedures   -Documenting clinical information in the electronic or other health record     Thank you for the opportunity to participate in the care of Karolina Herrera.     She is a 42 year old y/o female patient who comes to the sleep medicine clinic for follow up.  The patient was diagnosed with GILDARDO on 03/05/2022 (AH=51.9). This is the patient's 1st clinical visit since starting CPAP therapy. She reports that her sleep quality and energy level have improved.     Assessment and Plan:  In summary Karolina Herrera is a 42 year old year old female who is here for follow up.    1. Obstructive sleep apnea  I congratulated the patient on her excellent CPAP usage. I will keep her on the same pressure settings for now. I welcomed her to follow up with me bi-annually.    2. Abnormal blood chemistry  Informed her that her AST/ALT is elevated. She will follow up with her PCP.     Compliance Download data for 30 Days:  Pressure setting:CPAP 8 cwp  Residual AHI:1 events per hour  Leak:Minimal  Compliance:100%  Mask Tolerance:Good  Skin irritation:None  DME:[facility]    Lab reviewed: Discussed with patient.    ALISA:  ALSIA Total Score: 3  Total score - [place]: 6 (11/9/2022 10:44 AM)        Patient Active Problem List    Diagnosis     Family history of malignant neoplasm of breast     PCOS (polycystic ovarian syndrome)     Irritable bowel syndrome without diarrhea     BMI 40.0-44.9, adult (H)     ACP (advance care planning)     History of gestational diabetes     Vitamin D deficiency     Irritable bowel syndrome       Past Medical History:   Diagnosis Date     Abnormal vaginal bleeding 2008 and 2009     Amblyopia      At high risk for breast cancer      Encounter for gynecological examination without abnormal finding 10/19/2015    Overview:  Wife is Peyton.  Both are . Pap neg 6/10/ 2013: record in Care Everywhere from [facility]      Family history of malignant neoplasm of breast      High risk pregnancy, antepartum 3/30/2018    Overview:  Prenatal provider & planned delivery site: [facility] Wife is Peyton.   IVF through [facility] with donor from sperm bank.  Same donor as first pregnancy with their son Huy.  Labs and records requested from [facility].   Initial prenatal bloodwork ___  GC/CT ___- These labs were deferred to wait for records from [facility].   Pregravid BMI >40.  Early GCT ____ UC done.  Pap up to date 7/17 Geneti     Low back pain 6/18/2014     Morbid obesity due to excess calories (H) 3/25/2016     Obesity with body mass index 30 or greater 12/31/2014       Past Surgical History:   Procedure Laterality Date     GYN SURGERY  07/2014    Polypectomy     IVS  2015    invitro fertilization     LAPAROSCOPIC CHOLECYSTECTOMY N/A 11/07/2016    Procedure: LAPAROSCOPIC CHOLECYSTECTOMY;  Surgeon: Juan F Sherman MD;  [facility]     wisdom teeth removed       [facility] SLEEP STUDY, UNATTENDED, RECORD HEART RATE/O2 SAT/RESP ANAL/SLEEP TM  3/7/2022            Current Outpatient Medications   Medication Sig Dispense Refill     albuterol (PROAIR HFA/PROVENTIL HFA/VENTOLIN HFA) 108 (90 Base) MCG/ACT inhaler Inhale 2 puffs into the lungs every 4 hours as needed for shortness of breath / dyspnea, wheezing or other (cough) 1 Inhaler  0     mometasone (NASONEX) 50 MCG/ACT nasal spray Spray 2 sprays into both nostrils At Bedtime 17 g 3     Multiple Vitamins-Minerals (MULTIVITAMIN ADULT PO) Take 1 tablet by mouth       sertraline (ZOLOFT) 100 MG tablet Take 100 mg by mouth daily Take 100 mg by mouth daily       LORazepam (ATIVAN) 1 MG tablet Take 1 tablet (1 mg) by mouth once as needed for anxiety (prior to breast MRI) Take with you to appointment and let the nurse know; they will tell you the appropriate time to take the medication. Please have a  to/from the appointment. (Patient not taking: Reported on 10/10/2022) 1 tablet 0       No Known Allergies    Physical Exam:  GEN: NAD,  Psych: normal mood, normal affect    Labs/Studies:      No results found for: PH, PHARTERIAL, PO2, [id number], SAT, PCO2, HCO3, BASEEXCESS, MUNIRA, BEB  Lab Results   Component Value Date    TSH 1.87 10/28/2017    TSH 1.90 09/26/2016     Lab Results   Component Value Date    GLC 92 10/24/2022     (H) 09/02/2016     Lab Results   Component Value Date    HGB 13.2 10/24/2022    HGB 13.6 11/15/2019     Lab Results   Component Value Date    BUN 14 10/24/2022    BUN 15 09/02/2016    CR 0.60 10/24/2022    CR 0.66 11/10/2020     Lab Results   Component Value Date    AST 54 (H) 10/24/2022    AST 18 09/02/2016    ALT 77 (H) 10/24/2022    ALT 32 09/02/2016    ALKPHOS 80 10/24/2022    ALKPHOS 76 09/02/2016    BILITOTAL 0.6 10/24/2022    BILITOTAL 0.4 09/02/2016     No results found for: UAMP, UBARB, BENZODIAZEUR, UCANN, UCOC, OPIT, UPCP    Recent Labs   Lab Test 10/24/22  1230 11/10/20  0822 09/02/16  0622     --  140   POTASSIUM 4.2  --  4.1   CHLORIDE 107  --  110*   CO2 27  --  21   ANIONGAP 4  --  9   GLC 92  --  122*   BUN 14  --  15   CR 0.60 0.66 0.72   PAMELA 9.4  --  8.2*       No results found for: FAUSTINA    I reviewed the efficacy and compliance report from her device. Data summarized on the HPI and the PAP compliance flow sheet.     Patient verbalized  understanding of these issues, agrees with the plan and all questions were answered today. Patient was given an opportuntity to voice any other symptoms or concerns not listed above. Patient did not have any other symptoms or concerns.      Yossi Chandra DO  Board Certified in Internal Medicine and Sleep Medicine    (Note created with Dragon voice recognition and unintended spelling errors and word substitutions may occur)     Audio and visual devices were used for this virtual clinic visit with permission from patient.

## 2022-11-09 NOTE — NURSING NOTE
Sent patient a delayed [employer] message for a 2 year follow up appointment reminder.    Chandana Cook CMA

## 2022-11-13 RX ORDER — AMMONIUM LACTATE 12 G/100G
CREAM TOPICAL 2 TIMES DAILY
Qty: 385 G | Refills: 11 | Status: SHIPPED | OUTPATIENT
Start: 2022-11-13 | End: 2023-10-17

## 2022-12-01 DIAGNOSIS — J30.89 NON-SEASONAL ALLERGIC RHINITIS DUE TO OTHER ALLERGIC TRIGGER: ICD-10-CM

## 2022-12-01 DIAGNOSIS — J30.9 ALLERGIC RHINITIS WITH POSTNASAL DRIP: ICD-10-CM

## 2022-12-01 DIAGNOSIS — Z91.09 HOUSE DUST MITE ALLERGY: ICD-10-CM

## 2022-12-01 DIAGNOSIS — R09.82 ALLERGIC RHINITIS WITH POSTNASAL DRIP: ICD-10-CM

## 2022-12-06 RX ORDER — MOMETASONE FUROATE MONOHYDRATE 50 UG/1
2 SPRAY, METERED NASAL AT BEDTIME
Qty: 17 G | Refills: 3 | Status: SHIPPED | OUTPATIENT
Start: 2022-12-06 | End: 2023-03-10

## 2022-12-06 NOTE — TELEPHONE ENCOUNTER
MOMETASONE FUROATE 50 MCG SPRY    Sent new order as order from 11/8/2022 was not received.  17 g, 3 Refills sent to pharm       Idania Anguiano RN  Central Triage Red Flags/Med Refills     Yes

## 2022-12-09 ENCOUNTER — ANCILLARY PROCEDURE (OUTPATIENT)
Dept: MRI IMAGING | Facility: CLINIC | Age: 42
End: 2022-12-09
Attending: CLINICAL NURSE SPECIALIST
Payer: COMMERCIAL

## 2022-12-09 DIAGNOSIS — Z12.39 BREAST CANCER SCREENING, HIGH RISK PATIENT: ICD-10-CM

## 2022-12-09 DIAGNOSIS — Z80.3 FAMILY HISTORY OF MALIGNANT NEOPLASM OF BREAST: ICD-10-CM

## 2022-12-09 PROCEDURE — 77049 MRI BREAST C-+ W/CAD BI: CPT | Performed by: RADIOLOGY

## 2022-12-09 PROCEDURE — A9585 GADOBUTROL INJECTION: HCPCS | Performed by: RADIOLOGY

## 2022-12-09 RX ORDER — GADOBUTROL 604.72 MG/ML
15 INJECTION INTRAVENOUS ONCE
Status: DISCONTINUED | OUTPATIENT
Start: 2022-12-09 | End: 2022-12-09

## 2022-12-09 RX ORDER — GADOBUTROL 604.72 MG/ML
15 INJECTION INTRAVENOUS ONCE
Status: COMPLETED | OUTPATIENT
Start: 2022-12-09 | End: 2022-12-09

## 2022-12-09 RX ADMIN — GADOBUTROL 13 ML: 604.72 INJECTION INTRAVENOUS at 13:08

## 2022-12-13 ENCOUNTER — TELEPHONE (OUTPATIENT)
Dept: ONCOLOGY | Facility: CLINIC | Age: 42
End: 2022-12-13

## 2022-12-13 DIAGNOSIS — F41.9 ANXIETY: Primary | ICD-10-CM

## 2022-12-13 RX ORDER — ALPRAZOLAM 0.5 MG
1 TABLET ORAL
Qty: 1 TABLET | Refills: 1 | Status: SHIPPED | OUTPATIENT
Start: 2022-12-13 | End: 2023-06-01

## 2022-12-13 RX ORDER — ALPRAZOLAM 0.5 MG
0.5 TABLET ORAL
Qty: 1 TABLET | Refills: 0 | Status: CANCELLED | OUTPATIENT
Start: 2022-12-13

## 2022-12-13 NOTE — TELEPHONE ENCOUNTER
----- Message from Araceli Bass RN sent at 12/12/2022  2:21 PM CST -----  Regarding: Pre-med for MRI biopsy  HiKarolina is scheduled for an MRI biopsy on 12/15.     She is requesting an sedative medication for the biopsy.  Karolina said she took ativan with her last MRI and it kind of worked.  Not sure if Valium would be beneficial for her to try or changing the ativan dose.    Thoughts?  Could you order her medication so that she can bring it with her on Thursday for her procedure?  She is aware that a  will be required but was decided that the  just has be to available to pick her up do to .    Thank you,  Araceli

## 2022-12-15 ENCOUNTER — ANCILLARY PROCEDURE (OUTPATIENT)
Dept: MRI IMAGING | Facility: CLINIC | Age: 42
End: 2022-12-15
Attending: CLINICAL NURSE SPECIALIST
Payer: COMMERCIAL

## 2022-12-15 ENCOUNTER — ANCILLARY PROCEDURE (OUTPATIENT)
Dept: MAMMOGRAPHY | Facility: CLINIC | Age: 42
End: 2022-12-15
Attending: CLINICAL NURSE SPECIALIST
Payer: COMMERCIAL

## 2022-12-15 DIAGNOSIS — R92.8 ABNORMAL FINDING ON BREAST IMAGING: ICD-10-CM

## 2022-12-15 PROCEDURE — 88305 TISSUE EXAM BY PATHOLOGIST: CPT | Mod: 26 | Performed by: PATHOLOGY

## 2022-12-15 PROCEDURE — 88305 TISSUE EXAM BY PATHOLOGIST: CPT | Mod: TC | Performed by: CLINICAL NURSE SPECIALIST

## 2022-12-15 PROCEDURE — 19085 BX BREAST 1ST LESION MR IMAG: CPT | Mod: LT | Performed by: RADIOLOGY

## 2022-12-15 RX ORDER — GADOBUTROL 604.72 MG/ML
15 INJECTION INTRAVENOUS ONCE
Status: DISCONTINUED | OUTPATIENT
Start: 2022-12-15 | End: 2022-12-15

## 2022-12-15 RX ORDER — GADOBUTROL 604.72 MG/ML
15 INJECTION INTRAVENOUS ONCE
Status: COMPLETED | OUTPATIENT
Start: 2022-12-15 | End: 2022-12-15

## 2022-12-15 RX ORDER — LIDOCAINE HYDROCHLORIDE AND EPINEPHRINE 10; 10 MG/ML; UG/ML
10 INJECTION, SOLUTION INFILTRATION; PERINEURAL ONCE
Status: COMPLETED | OUTPATIENT
Start: 2022-12-15 | End: 2022-12-15

## 2022-12-15 RX ADMIN — GADOBUTROL 13 ML: 604.72 INJECTION INTRAVENOUS at 08:26

## 2022-12-15 RX ADMIN — LIDOCAINE HYDROCHLORIDE AND EPINEPHRINE 10 ML: 10; 10 INJECTION, SOLUTION INFILTRATION; PERINEURAL at 08:41

## 2022-12-19 LAB
PATH REPORT.COMMENTS IMP SPEC: NORMAL
PATH REPORT.COMMENTS IMP SPEC: NORMAL
PATH REPORT.FINAL DX SPEC: NORMAL
PATH REPORT.GROSS SPEC: NORMAL
PATH REPORT.MICROSCOPIC SPEC OTHER STN: NORMAL
PATH REPORT.RELEVANT HX SPEC: NORMAL
PHOTO IMAGE: NORMAL

## 2022-12-20 ENCOUNTER — TELEPHONE (OUTPATIENT)
Dept: MAMMOGRAPHY | Facility: CLINIC | Age: 42
End: 2022-12-20

## 2022-12-20 ENCOUNTER — TELEPHONE (OUTPATIENT)
Dept: ONCOLOGY | Facility: CLINIC | Age: 42
End: 2022-12-20

## 2022-12-20 DIAGNOSIS — R92.8 ABNORMAL FINDING ON BREAST IMAGING: Primary | ICD-10-CM

## 2022-12-20 NOTE — TELEPHONE ENCOUNTER
Spoke to Karolina about the benign findings from her breast biopsy done last week.  We discussed the Radiologist's recommendation of 6 month follow up Breast MRI.  Karolina verbalized understanding and all questions and concerns were answered at this time.

## 2023-03-08 DIAGNOSIS — J30.9 ALLERGIC RHINITIS WITH POSTNASAL DRIP: ICD-10-CM

## 2023-03-08 DIAGNOSIS — R09.82 ALLERGIC RHINITIS WITH POSTNASAL DRIP: ICD-10-CM

## 2023-03-08 DIAGNOSIS — J30.89 NON-SEASONAL ALLERGIC RHINITIS DUE TO OTHER ALLERGIC TRIGGER: ICD-10-CM

## 2023-03-08 DIAGNOSIS — Z91.09 HOUSE DUST MITE ALLERGY: ICD-10-CM

## 2023-03-10 RX ORDER — MOMETASONE FUROATE MONOHYDRATE 50 UG/1
2 SPRAY, METERED NASAL AT BEDTIME
Qty: 17 G | Refills: 5 | Status: SHIPPED | OUTPATIENT
Start: 2023-03-10 | End: 2023-10-17

## 2023-03-10 NOTE — TELEPHONE ENCOUNTER
11/8/2022  [facility] [place]     Harjit Vasquez MD  Dermatology     mometasone (NASONEX) 50 MCG/ACT nasal spray

## 2023-04-18 ENCOUNTER — TELEPHONE (OUTPATIENT)
Dept: FAMILY MEDICINE | Facility: CLINIC | Age: 43
End: 2023-04-18
Payer: COMMERCIAL

## 2023-04-18 NOTE — TELEPHONE ENCOUNTER
Patient Quality Outreach    Patient is due for the following:   Cervical Cancer Screening - PAP Needed  Physical Preventive Adult Physical    Next Steps:   Schedule a Adult Preventative    Type of outreach:    Sent CC video message.    Next Steps:  Reach out within 90 days via CC video.    Max number of attempts reached: No. Will try again in 90 days if patient still on fail list.    Questions for provider review:    None     Estela Rdz RN  Chart routed to Care Team.

## 2023-04-22 ENCOUNTER — HEALTH MAINTENANCE LETTER (OUTPATIENT)
Age: 43
End: 2023-04-22

## 2023-06-01 DIAGNOSIS — F41.9 ANXIETY: ICD-10-CM

## 2023-06-01 RX ORDER — ALPRAZOLAM 1 MG
1 TABLET ORAL
Qty: 1 TABLET | Refills: 0 | Status: SHIPPED | OUTPATIENT
Start: 2023-06-01 | End: 2023-10-17

## 2023-06-02 DIAGNOSIS — Z12.39 BREAST CANCER SCREENING, HIGH RISK PATIENT: Primary | ICD-10-CM

## 2023-06-02 DIAGNOSIS — Z80.3 FAMILY HISTORY OF MALIGNANT NEOPLASM OF BREAST: ICD-10-CM

## 2023-06-23 ENCOUNTER — ANCILLARY PROCEDURE (OUTPATIENT)
Dept: MRI IMAGING | Facility: CLINIC | Age: 43
End: 2023-06-23
Attending: CLINICAL NURSE SPECIALIST
Payer: COMMERCIAL

## 2023-06-23 DIAGNOSIS — R92.8 ABNORMAL FINDING ON BREAST IMAGING: ICD-10-CM

## 2023-06-23 PROCEDURE — 77049 MRI BREAST C-+ W/CAD BI: CPT | Performed by: RADIOLOGY

## 2023-06-23 PROCEDURE — A9585 GADOBUTROL INJECTION: HCPCS | Mod: JZ | Performed by: RADIOLOGY

## 2023-06-23 RX ORDER — GADOBUTROL 604.72 MG/ML
15 INJECTION INTRAVENOUS ONCE
Status: COMPLETED | OUTPATIENT
Start: 2023-06-23 | End: 2023-06-23

## 2023-06-23 RX ADMIN — GADOBUTROL 13 ML: 604.72 INJECTION INTRAVENOUS at 10:43

## 2023-10-16 DIAGNOSIS — G47.33 OBSTRUCTIVE SLEEP APNEA: Primary | ICD-10-CM

## 2023-10-17 ENCOUNTER — OFFICE VISIT (OUTPATIENT)
Dept: FAMILY MEDICINE | Facility: CLINIC | Age: 43
End: 2023-10-17
Payer: COMMERCIAL

## 2023-10-17 VITALS
SYSTOLIC BLOOD PRESSURE: 115 MMHG | DIASTOLIC BLOOD PRESSURE: 79 MMHG | HEIGHT: 67 IN | TEMPERATURE: 97.2 F | RESPIRATION RATE: 19 BRPM | BODY MASS INDEX: 45.99 KG/M2 | HEART RATE: 87 BPM | WEIGHT: 293 LBS | OXYGEN SATURATION: 98 %

## 2023-10-17 DIAGNOSIS — K92.1 BLOOD IN STOOL: ICD-10-CM

## 2023-10-17 DIAGNOSIS — Z12.83 SKIN CANCER SCREENING: ICD-10-CM

## 2023-10-17 DIAGNOSIS — Z13.220 SCREENING CHOLESTEROL LEVEL: ICD-10-CM

## 2023-10-17 DIAGNOSIS — N39.3 FEMALE STRESS INCONTINENCE: ICD-10-CM

## 2023-10-17 DIAGNOSIS — R79.89 ELEVATED LIVER FUNCTION TESTS: ICD-10-CM

## 2023-10-17 DIAGNOSIS — K58.2 IRRITABLE BOWEL SYNDROME WITH BOTH CONSTIPATION AND DIARRHEA: ICD-10-CM

## 2023-10-17 DIAGNOSIS — B37.2 CANDIDAL INTERTRIGO: ICD-10-CM

## 2023-10-17 DIAGNOSIS — Z12.4 CERVICAL CANCER SCREENING: ICD-10-CM

## 2023-10-17 DIAGNOSIS — Z00.00 ROUTINE HISTORY AND PHYSICAL EXAMINATION OF ADULT: Primary | ICD-10-CM

## 2023-10-17 DIAGNOSIS — E28.2 PCOS (POLYCYSTIC OVARIAN SYNDROME): ICD-10-CM

## 2023-10-17 DIAGNOSIS — G47.33 OBSTRUCTIVE SLEEP APNEA SYNDROME: ICD-10-CM

## 2023-10-17 PROCEDURE — G0145 SCR C/V CYTO,THINLAYER,RESCR: HCPCS | Performed by: NURSE PRACTITIONER

## 2023-10-17 PROCEDURE — 99214 OFFICE O/P EST MOD 30 MIN: CPT | Mod: 25 | Performed by: NURSE PRACTITIONER

## 2023-10-17 PROCEDURE — 99386 PREV VISIT NEW AGE 40-64: CPT | Performed by: NURSE PRACTITIONER

## 2023-10-17 PROCEDURE — 87624 HPV HI-RISK TYP POOLED RSLT: CPT | Performed by: NURSE PRACTITIONER

## 2023-10-17 RX ORDER — ESCITALOPRAM OXALATE 10 MG/1
10 TABLET ORAL DAILY
COMMUNITY
Start: 2023-10-17

## 2023-10-17 ASSESSMENT — ENCOUNTER SYMPTOMS: BREAST MASS: 0

## 2023-10-17 ASSESSMENT — PAIN SCALES - GENERAL: PAINLEVEL: NO PAIN (0)

## 2023-10-17 NOTE — PROGRESS NOTES
"   SUBJECTIVE:   CC: Karolina is an 43 year old who presents for preventive health visit.       10/17/2023     2:57 PM   Additional Questions   Roomed by Sharda Heard         10/17/2023     2:59 PM   Patient Reported Additional Medications   Patient reports taking the following new medications claritin, lexapro       Healthy Habits:     Getting at least 3 servings of Calcium per day:  Yes    Bi-annual eye exam:  NO    Dental care twice a year:  Yes    Sleep apnea or symptoms of sleep apnea:  Sleep apnea    Diet:  Regular (no restrictions)    Frequency of exercise:  2-3 days/week    Duration of exercise:  15-30 minutes    Taking medications regularly:  Yes    Medication side effects:  None    Additional concerns today:  Yes    She has started using a CPAP for sleep apnea; which she feels is working well. Mood is stable right now-working with a psychiatrist and counselor. She is predominantly working from home (as a ). She and her partner (Peyton) have two daughters, Huy and Celia, who are doing well.  Has noticed redness/irritation in skin folds around vulva and inner thighs.  Ongoing stress incontinence since her pregnancies. NO current signs of urinary infection. No vaginal discharge. She and her partner are monagamous. She is interested in trying pelvic floor physical therapy. Periods are regular right now- has a history of PCOS.  Has a history of irritable bowel syndrome-alternating constipation/diarrhea. Has cramping/bloating/gas symptoms frequently, and has for a long time. She has not noticed particular food triggers. Over the past several months she has noticed \"drops of blood\" with stooling. Does not seem connected to particularly hard stools. Has no rectal symptoms of itching or pain. She does not think her abdominal cramping symptoms have changed much if at all; no weight changes; appetite is steady.    Today's PHQ-2 Score:       10/17/2023     2:47 PM   PHQ-2 ( 1999 [employer])   Q1: Little interest or " pleasure in doing things 0   Q2: Feeling down, depressed or hopeless 0   PHQ-2 Score 0   Q1: Little interest or pleasure in doing things Not at all   Q2: Feeling down, depressed or hopeless Not at all   PHQ-2 Score 0               -------------------------------------  Have you ever done Advance Care Planning? (For example, a Health Directive, POLST, or a discussion with a medical provider or your loved ones about your wishes): Yes, patient states has an Advance Care Planning document and will bring a copy to the clinic.    Social History     Tobacco Use    Smoking status: Never    Smokeless tobacco: Never   Substance Use Topics    Alcohol use: Yes     Comment: 1-2 per week.             10/17/2023     2:47 PM   Alcohol Use   Prescreen: >3 drinks/day or >7 drinks/week? No          No data to display              Reviewed orders with patient.  Reviewed health maintenance and updated orders accordingly - Yes  Lab work is in process  Labs reviewed in EPIC    Breast Cancer Screening:    FHS-7:       4/29/2022     3:10 PM   Breast CA Risk Assessment (FHS-7)   Did any of your first-degree relatives have breast or ovarian cancer? Yes   Did any of your relatives have bilateral breast cancer? No   Did any man in your family have breast cancer? No   Did any woman in your family have breast and ovarian cancer? No   Did any woman in your family have breast cancer before age 50 y? Yes   Do you have 2 or more relatives with breast and/or ovarian cancer? No   Do you have 2 or more relatives with breast and/or bowel cancer? No     click delete button to remove this line now    Pertinent mammograms are reviewed under the imaging tab.    History of abnormal Pap smear: NO - age 30-65 PAP every 5 years with negative HPV co-testing recommended      Latest Ref Rng & Units 7/27/2017     3:20 PM 7/27/2017     3:11 PM   PAP / HPV   PAP (Historical)   NIL    HPV 16 DNA NEG Negative     HPV 18 DNA NEG Negative     Other HR HPV NEG Negative    "    Reviewed and updated as needed this visit by clinical staff   Tobacco  Allergies  Meds              Reviewed and updated as needed this visit by Provider                   Review of Systems   Breasts:  Negative for tenderness, breast mass and discharge.   Genitourinary:  Negative for pelvic pain, vaginal bleeding and vaginal discharge.        OBJECTIVE:   /79   Pulse 87   Temp 97.2  F (36.2  C) (Temporal)   Resp 19   Ht 1.705 m (5' 7.13\")   Wt 139.8 kg (308 lb 3.2 oz)   LMP 09/22/2023 (Exact Date)   SpO2 98%   Breastfeeding No   BMI 48.09 kg/m    Physical Exam  GENERAL: healthy, alert and no distress  EYES: Eyes grossly normal to inspection, PERRL and conjunctivae and sclerae normal  HENT: ear canals and TM's normal, nose and mouth without ulcers or lesions  NECK: no adenopathy, no asymmetry, masses, or scars and thyroid normal to palpation  RESP: lungs clear to auscultation - no rales, rhonchi or wheezes  BREAST: normal without masses, tenderness or nipple discharge and no palpable axillary masses or adenopathy  CV: regular rate and rhythm, normal S1 S2, no S3 or S4, no murmur, click or rub, no peripheral edema and peripheral pulses strong  ABDOMEN: soft, nontender, no hepatosplenomegaly, no masses and bowel sounds normal   (female): normal female external genitalia, normal urethral meatus , vaginal mucosa pink, moist, well rugated, normal cervix, adnexae, and uterus without masses., and rectal exam normal without masses  RECTAL (female): normal sphincter tone, no rectal masses  MS: no gross musculoskeletal defects noted, no edema  SKIN: intertrigo inguinalis  NEURO: Normal strength and tone, mentation intact and speech normal  PSYCH: mentation appears normal, affect normal/bright    Diagnostic Test Results:  Labs reviewed in Epic    ASSESSMENT/PLAN:       ICD-10-CM    1. Routine history and physical examination of adult  Z00.00       2. Cervical cancer screening  Z12.4 Pap Screen with HPV - " "recommended age 30 - 65 years     HPV Hold (Lab Only)      3. PCOS (polycystic ovarian syndrome)  E28.2 **Hemoglobin A1c FUTURE 3mo      4. BMI 45.0-49.9, adult (H)  Z68.42       5. Obstructive sleep apnea syndrome  G47.33       6. Screening cholesterol level  Z13.220 Lipid panel reflex to direct LDL Fasting      7. Elevated liver function tests  R79.89 **Comprehensive metabolic panel FUTURE 2mo      8. Female stress incontinence  N39.3 Physical Therapy Referral      9. Skin cancer screening  Z12.83 Adult Dermatology  Referral      10. Blood in stool  K92.1 Adult GI  Referral - Procedure Only     **CBC with platelets differential FUTURE 2mo     Adult GI  Referral - Consult Only      11. Irritable bowel syndrome with both constipation and diarrhea  K58.2 Adult GI  Referral - Procedure Only     Adult GI  Referral - Consult Only     Allergy adult food panel      12. Candidal intertrigo  B37.2 nystatin (MYCOSTATIN) 712810 UNIT/GM external powder            COUNSELING:  Reviewed preventive health counseling, as reflected in patient instructions       Regular exercise       Healthy diet/nutrition       Colorectal Cancer Screening      BMI:   Estimated body mass index is 48.09 kg/m  as calculated from the following:    Height as of this encounter: 1.705 m (5' 7.13\").    Weight as of this encounter: 139.8 kg (308 lb 3.2 oz).         She reports that she has never smoked. She has never used smokeless tobacco.    MIKEL Maurer CNP  [facility]  "

## 2023-10-19 RX ORDER — NYSTATIN 100000 [USP'U]/G
POWDER TOPICAL 3 TIMES DAILY
Qty: 120 G | Refills: 3 | Status: SHIPPED | OUTPATIENT
Start: 2023-10-19

## 2023-10-20 LAB
BKR LAB AP GYN ADEQUACY: NORMAL
BKR LAB AP GYN INTERPRETATION: NORMAL
BKR LAB AP HPV REFLEX: NORMAL
BKR LAB AP PREVIOUS ABNORMAL: NORMAL
PATH REPORT.COMMENTS IMP SPEC: NORMAL
PATH REPORT.COMMENTS IMP SPEC: NORMAL
PATH REPORT.RELEVANT HX SPEC: NORMAL

## 2023-10-24 ENCOUNTER — TELEPHONE (OUTPATIENT)
Dept: GASTROENTEROLOGY | Facility: CLINIC | Age: 43
End: 2023-10-24
Payer: COMMERCIAL

## 2023-10-24 LAB
HUMAN PAPILLOMA VIRUS 16 DNA: NEGATIVE
HUMAN PAPILLOMA VIRUS 18 DNA: NEGATIVE
HUMAN PAPILLOMA VIRUS FINAL DIAGNOSIS: NORMAL
HUMAN PAPILLOMA VIRUS OTHER HR: NEGATIVE

## 2023-10-24 NOTE — TELEPHONE ENCOUNTER
"Endoscopy Scheduling Screen    Have you had a positive Covid test in the last 14 days?  No    Are you active on MyChart?   Yes    What insurance is in the chart?  Other:       Ordering/Referring Provider:     COLTON DUMAS      (If ordering provider performs procedure, schedule with ordering provider unless otherwise instructed. )    BMI: Estimated body mass index is 48.09 kg/m  as calculated from the following:    Height as of 10/17/23: 1.705 m (5' 7.13\").    Weight as of 10/17/23: 139.8 kg (308 lb 3.2 oz).     Sedation Ordered  moderate sedation.   If patient BMI > 50 do not schedule in [facility].    If patient BMI > 45 do not schedule at [facility].    Are you taking methadone or Suboxone?  No    Are you taking any prescription medications for pain 3 or more times per week?   No    Do you have a history of malignant hyperthermia or adverse reaction to anesthesia?  No    (Females) Are you currently pregnant?   No     Have you been diagnosed or told you have pulmonary hypertension?   No    Do you have an LVAD?  No    Have you been told you have moderate to severe sleep apnea?  Yes (RN Review required for scheduling unless scheduling in Hospital.)  cpap  Have you been told you have COPD, asthma, or any other lung disease?  No    Do you have any heart conditions?  No     Have you ever had an organ transplant?   No    Have you ever had or are you awaiting a heart or lung transplant?   No    Have you had a stroke or transient ischemic attack (TIA aka \"mini stroke\" in the last 6 months?   No    Have you been diagnosed with or been told you have cirrhosis of the liver?   No    Are you currently on dialysis?   No    Do you need assistance transferring?   No    BMI: Estimated body mass index is 48.09 kg/m  as calculated from the following:    Height as of 10/17/23: 1.705 m (5' 7.13\").    Weight as of 10/17/23: 139.8 kg (308 lb 3.2 oz).     Is patients BMI > 40 and scheduling location [facility]?  Yes (If MAC sedation is ordered, " schedule PAC eval)    Do you take an injectable medication for weight loss or diabetes (excluding insulin)?  No    Do you take the medication Naltrexone?  No    Do you take blood thinners?  No       Prep   Are you currently on dialysis or do you have chronic kidney disease?  No    Do you have a diagnosis of diabetes?  No    Do you have a diagnosis of cystic fibrosis (CF)?  No    On a regular basis do you go 3 -5 days between bowel movements?  No    BMI > 40?  Yes (Extended Prep)    Preferred Pharmacy:    [facility] [id number] [place], [place] - [address]  [address] [address]  Phone: [phone] Fax: [phone]      Final Scheduling Details   Colonoscopy prep sent?  Golytely Extended Prep    Procedure scheduled  Colonoscopy    Surgeon:  Chidi     Date of procedure:  1/10/24     Pre-OP / PAC:   No - Not required for this site.    Location  [facility] - Per exclusion criteria.    Sedation   Moderate Sedation - Per order.      Patient Reminders:   You will receive a call from a Nurse to review instructions and health history.  This assessment must be completed prior to your procedure.  Failure to complete the Nurse assessment may result in the procedure being cancelled.      On the day of your procedure, please designate an adult(s) who can drive you home stay with you for the next 24 hours. The medicines used in the exam will make you sleepy. You will not be able to drive.      You cannot take public transportation, ride share services, or non-medical taxi service without a responsible caregiver.  Medical transport services are allowed with the requirement that a responsible caregiver will receive you at your destination.  We require that drivers and caregivers are confirmed prior to your procedure.

## 2023-11-09 ENCOUNTER — LAB (OUTPATIENT)
Dept: LAB | Facility: CLINIC | Age: 43
End: 2023-11-09
Payer: COMMERCIAL

## 2023-11-09 DIAGNOSIS — K92.1 BLOOD IN STOOL: ICD-10-CM

## 2023-11-09 DIAGNOSIS — R79.89 ELEVATED LIVER FUNCTION TESTS: ICD-10-CM

## 2023-11-09 DIAGNOSIS — Z13.220 SCREENING CHOLESTEROL LEVEL: ICD-10-CM

## 2023-11-09 DIAGNOSIS — E28.2 PCOS (POLYCYSTIC OVARIAN SYNDROME): ICD-10-CM

## 2023-11-09 DIAGNOSIS — K58.2 IRRITABLE BOWEL SYNDROME WITH BOTH CONSTIPATION AND DIARRHEA: ICD-10-CM

## 2023-11-09 LAB
ALBUMIN SERPL BCG-MCNC: 4.3 G/DL (ref 3.5–5.2)
ALP SERPL-CCNC: 69 U/L (ref 35–104)
ALT SERPL W P-5'-P-CCNC: 55 U/L (ref 0–50)
ANION GAP SERPL CALCULATED.3IONS-SCNC: 11 MMOL/L (ref 7–15)
AST SERPL W P-5'-P-CCNC: 39 U/L (ref 0–45)
BASOPHILS # BLD AUTO: 0 10E3/UL (ref 0–0.2)
BASOPHILS NFR BLD AUTO: 0 %
BILIRUB SERPL-MCNC: 0.4 MG/DL
BUN SERPL-MCNC: 12 MG/DL (ref 6–20)
CALCIUM SERPL-MCNC: 9.1 MG/DL (ref 8.6–10)
CHLORIDE SERPL-SCNC: 105 MMOL/L (ref 98–107)
CHOLEST SERPL-MCNC: 171 MG/DL
CREAT SERPL-MCNC: 0.68 MG/DL (ref 0.51–0.95)
DEPRECATED HCO3 PLAS-SCNC: 23 MMOL/L (ref 22–29)
EGFRCR SERPLBLD CKD-EPI 2021: >90 ML/MIN/1.73M2
EOSINOPHIL # BLD AUTO: 0.2 10E3/UL (ref 0–0.7)
EOSINOPHIL NFR BLD AUTO: 2 %
ERYTHROCYTE [DISTWIDTH] IN BLOOD BY AUTOMATED COUNT: 13.3 % (ref 10–15)
GLUCOSE SERPL-MCNC: 101 MG/DL (ref 70–99)
HBA1C MFR BLD: 5.5 % (ref 0–5.6)
HCT VFR BLD AUTO: 40.5 % (ref 35–47)
HDLC SERPL-MCNC: 41 MG/DL
HGB BLD-MCNC: 13 G/DL (ref 11.7–15.7)
IMM GRANULOCYTES # BLD: 0 10E3/UL
IMM GRANULOCYTES NFR BLD: 0 %
LDLC SERPL CALC-MCNC: 102 MG/DL
LYMPHOCYTES # BLD AUTO: 2.9 10E3/UL (ref 0.8–5.3)
LYMPHOCYTES NFR BLD AUTO: 33 %
MCH RBC QN AUTO: 28.1 PG (ref 26.5–33)
MCHC RBC AUTO-ENTMCNC: 32.1 G/DL (ref 31.5–36.5)
MCV RBC AUTO: 88 FL (ref 78–100)
MONOCYTES # BLD AUTO: 0.5 10E3/UL (ref 0–1.3)
MONOCYTES NFR BLD AUTO: 6 %
NEUTROPHILS # BLD AUTO: 5.3 10E3/UL (ref 1.6–8.3)
NEUTROPHILS NFR BLD AUTO: 59 %
NONHDLC SERPL-MCNC: 130 MG/DL
PLATELET # BLD AUTO: 240 10E3/UL (ref 150–450)
POTASSIUM SERPL-SCNC: 4.5 MMOL/L (ref 3.4–5.3)
PROT SERPL-MCNC: 7.2 G/DL (ref 6.4–8.3)
RBC # BLD AUTO: 4.62 10E6/UL (ref 3.8–5.2)
SODIUM SERPL-SCNC: 139 MMOL/L (ref 135–145)
TRIGL SERPL-MCNC: 140 MG/DL
WBC # BLD AUTO: 9 10E3/UL (ref 4–11)

## 2023-11-09 PROCEDURE — 85025 COMPLETE CBC W/AUTO DIFF WBC: CPT

## 2023-11-09 PROCEDURE — 86003 ALLG SPEC IGE CRUDE XTRC EA: CPT

## 2023-11-09 PROCEDURE — 36415 COLL VENOUS BLD VENIPUNCTURE: CPT

## 2023-11-09 PROCEDURE — 80061 LIPID PANEL: CPT

## 2023-11-09 PROCEDURE — 80053 COMPREHEN METABOLIC PANEL: CPT

## 2023-11-09 PROCEDURE — 83036 HEMOGLOBIN GLYCOSYLATED A1C: CPT

## 2023-11-12 LAB
ALMOND IGE QN: <0.1 KU(A)/L
CASHEW NUT IGE QN: <0.1 KU(A)/L
CODFISH IGE QN: <0.1 KU(A)/L
COW MILK IGE QN: <0.1 KU(A)/L
EGG WHITE IGE QN: <0.1 KU(A)/L
HAZELNUT IGE QN: <0.1 KU(A)/L
IGE SERPL-ACNC: 105 KU/L (ref 0–114)
PEANUT IGE QN: <0.1 KU(A)/L
SALMON IGE QN: <0.1 KU(A)/L
SCALLOP IGE QN: <0.1 KU(A)/L
SESAME SEED IGE QN: <0.1 KU(A)/L
SHRIMP IGE QN: <0.1 KU(A)/L
SOYBEAN IGE QN: <0.1 KU(A)/L
TUNA IGE QN: <0.1 KU(A)/L
WALNUT IGE QN: <0.1 KU(A)/L
WHEAT IGE QN: <0.1 KU(A)/L

## 2023-11-29 ENCOUNTER — THERAPY VISIT (OUTPATIENT)
Dept: PHYSICAL THERAPY | Facility: CLINIC | Age: 43
End: 2023-11-29
Payer: COMMERCIAL

## 2023-11-29 DIAGNOSIS — N39.3 FEMALE STRESS INCONTINENCE: ICD-10-CM

## 2023-11-29 DIAGNOSIS — N39.3 SUI (STRESS URINARY INCONTINENCE, FEMALE): Primary | ICD-10-CM

## 2023-11-29 DIAGNOSIS — N81.89 PELVIC FLOOR WEAKNESS: ICD-10-CM

## 2023-11-29 PROCEDURE — 97161 PT EVAL LOW COMPLEX 20 MIN: CPT | Mod: GP | Performed by: PHYSICAL THERAPIST

## 2023-11-29 PROCEDURE — 97110 THERAPEUTIC EXERCISES: CPT | Mod: 59 | Performed by: PHYSICAL THERAPIST

## 2023-11-29 PROCEDURE — 97530 THERAPEUTIC ACTIVITIES: CPT | Mod: GP | Performed by: PHYSICAL THERAPIST

## 2023-11-29 NOTE — PROGRESS NOTES
PHYSICAL THERAPY EVALUATION  Type of Visit: Evaluation    See electronic medical record for Abuse and Falls Screening details.    Subjective       Presenting condition or subjective complaint: urinary incontinence when sneezing/coughing  Date of onset: 10/17/23    Relevant medical history: Incontinence; Overweight   Dates & types of surgery:      Prior diagnostic imaging/testing results:       Prior therapy history for the same diagnosis, illness or injury: No      Living Environment  Social support: With a significant other or spouse   Type of home: House   Stairs to enter the home: Yes 4 Is there a railing: Yes   Ramp: No   Stairs inside the home: Yes 12 Is there a railing: Yes   Help at home: None  Equipment owned:       Employment: Yes   Hobbies/Interests: swimming, cooking, being outside, reading    Patient goals for therapy: not leak    Karolina notes stress urinary incontinence that began after her first pregnancy 8 years ago. She reports leaking with an unexpected cough or sneeze and sometimes with squatting/bending down.     Pain assessment: Pain denied     Objective      PELVIC EVALUATION  ADDITIONAL HISTORY:  Sex assigned at birth: Female  Gender identity: Female    Pronouns: She/Her Hers      Bladder History:  Feels bladder filling: Yes  Triggers for feeling of inability to wait to go to the bathroom: No    How long can you wait to urinate: indefinite  Gets up at night to urinate: No    Can stop the flow of urine when urinating: Yes  Volume of urine usually released: Average   Other issues: Dribbling after urinating- almost every time she voids  Number of bladder infections in last 12 months:    Fluid intake per day: 60?      Medications taken for bladder: No     Activities causing urine leak: Cough; Sneeze; Other activities coughing/sneezing when squatting or crouching  Amount of urine typically leaked: small amount  Pads used to help with leaking: No      She is voiding about every 3-4 hours  during the day.   She denies any sudden strong urges to void.     Bowel History:  Frequency of bowel movement: usually 2-3 times per day  Consistency of stool: Other varies, often very soft  , bristol stool scale: type 5-6  Ignores the urge to defecate: No  Other bowel issues:    Length of time spent trying to have a bowel movement:      Sexual Function History:  Sexual orientation: Lesbian    Sexually active: Yes  Lubrication used: Yes Yes  Pelvic pain:      Pain or difficulty with orgasms/erection/ejaculation: No    State of menopause: Perimenopause (have not gone through menopause yet)  Hormone medications: No      Are you currently pregnant: No, Number of previous pregnancies: 2, Number of deliveries: 2, If you have delivered before, did you have any of these issues during delivery: Tearing; Vaginal delivery, Have you been diagnosed with pelvic prolapse or abdominal separation: No, Do you get regular exercise: No, Have you tried pelvic floor strengthening exercises for 4 weeks: No, Do you have any history of trauma that is relevant to your care that you d like to share: No    Discussed reason for referral regarding pelvic health needs and external/internal pelvic floor muscle examination with patient/guardian.  Opportunity provided to ask questions and verbal consent for assessment and intervention was given.    POSTURE: WNL  Functional Strength Testing: Double Leg Squat: Anterior knee translation    PELVIC EXAM  External Visual Inspection:  At rest: Distended perineal body  With voluntary pelvic floor contraction: Present  Relaxation of PFM: Partial/delayed relaxation  With intra-abdominal pressure: Cough: No change    Integumentary:   Introitus: Unremarkable    External Digital Palpation per Perineum:   Ischiocavernosis: Tightness, Tenderness  Bulbo cavernosis: Unremarkable  Transverse perineal: Unremarkable  Levator ani: Unremarkable    Internal Digital Palpation:  Per Vagina:  Tone: slight increased 1-2nd  layer of pelvic floor muscles L>R, no increase in tone 3rd layer  Digital Muscle Performance: P (Power): 2-/5  E (Endurance): 5 seconds  F (Fast Twitch): 8/10  Compensations: Breath holding  Relaxation Post-Contraction: Normal, Partial/delayed relaxation    ABDOMINAL ASSESSMENT  Diastasis Rectus Abdominis (ACACIA):  ACACIA presence: No  Slight doming/tenting with pt hook lying rasing both feet a few inches off of the table.    Assessment & Plan   CLINICAL IMPRESSIONS  Medical Diagnosis: stress urinary incontinence    Treatment Diagnosis: stress urinary incontinence   Impression/Assessment: Patient is a 43 year old female with stress urinary incontinence complaints.  The following significant findings have been identified: Decreased strength, Impaired muscle performance, and Decreased activity tolerance. These impairments interfere with their ability to perform self care tasks, work tasks, recreational activities, and household chores as compared to previous level of function.     Clinical Decision Making (Complexity):  Clinical Presentation: Stable/Uncomplicated  Clinical Presentation Rationale: based on medical and personal factors listed in PT evaluation  Clinical Decision Making (Complexity): Low complexity    PLAN OF CARE  Treatment Interventions:  Modalities: Biofeedback, Cryotherapy, Dry Needling, E-stim, Hot Pack  Interventions: Gait Training, Manual Therapy, Neuromuscular Re-education, Therapeutic Activity, Therapeutic Exercise, Self-Care/Home Management    Long Term Goals     PT Goal 1  Goal Description: Pt will have no leaking of urine with cough/sneeze.  Rationale: to maximize safety and independence with performance of ADLs and functional tasks  Target Date: 02/21/24      Frequency of Treatment: 1x a week for the first 4 weeks followed by 2x a month for the follwing 8 weeks  Duration of Treatment: 12 weeks    Education Assessment:   Learner/Method: Patient;No Barriers to Learning    Risks and benefits of  evaluation/treatment have been explained.   Patient/Family/caregiver agrees with Plan of Care.     Evaluation Time:     PT Goldie, Low Complexity Minutes (84480): 30     Signing Clinician: Meg Askew PT

## 2023-12-05 ENCOUNTER — NURSE TRIAGE (OUTPATIENT)
Dept: FAMILY MEDICINE | Facility: CLINIC | Age: 43
End: 2023-12-05
Payer: COMMERCIAL

## 2023-12-05 DIAGNOSIS — U07.1 INFECTION DUE TO 2019 NOVEL CORONAVIRUS: Primary | ICD-10-CM

## 2023-12-05 NOTE — TELEPHONE ENCOUNTER
RN COVID TREATMENT VISIT  12/05/23      The patient has been triaged and does not require a higher level of care.    Karolina Herrera  43 year old  Current weight? 308 lbs    Has the patient been seen by a primary care provider at an [facility] or [facility] within the past two years? Yes.   Have you been in close proximity to/do you have a known exposure to a person with a confirmed case of influenza? No.     General treatment eligibility:  Date of positive COVID test (PCR or at home)?  12/4/23    Are you or have you been hospitalized for this COVID-19 infection? No.   Have you received monoclonal antibodies or antiviral treatment for COVID-19 since this positive test? No.   Do you have any of the following conditions that place you at risk of being very sick from COVID-19?   - Overweight or Obesity (BMI >85th percentile or BMI 25 or higher)  Yes, patient has at least one high risk condition as noted above.     Current COVID symptoms:   - fever or chills  - sore throat  - congestion or runny nose  - nausea or vomiting  - diarrhea  Yes. Patient has at least one symptom as selected.     How many days since symptoms started? 5 days or less. Established patient, 12 years or older weighing at least 88.2 lbs, who has symptoms that started in the past 5 days, has not been hospitalized nor received treatment already, and is at risk for being very sick from COVID-19.     Treatment eligibility by RN:  Are you currently pregnant or nursing? No  Do you have a clinically significant hypersensitivity to nirmatrelvir or ritonavir, or toxic epidermal necrolysis (TEN) or Ni-Alexey Syndrome? No  Do you have a history of hepatitis, any hepatic impairment on the Problem List (such as Child-Bangura Class C, cirrhosis, fatty liver disease, alcoholic liver disease), or was the last liver lab (hepatic panel, ALT, AST, ALK Phos, bilirubin) elevated in the past 6 months? No  Do you have any history of severe  renal impairment (eGFR < 30mL/min)? No    Is patient eligible to continue? Yes, patient meets all eligibility requirements for the RN COVID treatment (as denoted by all no responses above).     Current Outpatient Medications   Medication Sig Dispense Refill    escitalopram (LEXAPRO) 10 MG tablet Take 1 tablet (10 mg) by mouth daily      nystatin (MYCOSTATIN) 297411 UNIT/GM external powder Apply topically 3 times daily 120 g 3       Medications from List 1 of the standing order (on medications that exclude the use of Paxlovid) that patient is taking: NONE. Is patient taking [place]'s Wort? No  Is patient taking [place]'s Wort or any meds from List 1? No.   Medications from List 2 of the standing order (on meds that provider needs to adjust) that patient is taking: NONE. Is patient on any of the meds from List 2? No.   Medications from List 3 of standing order (on meds that a RN needs to adjust) that patient is taking: NONE. Is patient on any meds from List 3? No.     Paxlovid has an approximate 90% reduction in hospitalization. Paxlovid can possibly cause altered sense of taste, diarrhea (loose, watery stools), high blood pressure, muscle aches.     Would patient like a Paxlovid prescription?   Yes.   Lab Results   Component Value Date    GFRESTIMATED >90 11/09/2023       Was last eGFR reduced? No, eGFR 60 or greater/ No Result on record. Patient can receive the normal renal function dose. Paxlovid Rx sent to [facility]    Temporary change to home medications: None    All medication adjustments (holds, etc) were discussed with the patient and patient was asked to repeat back (teachback) their med adjustment.  Did patient understand med adjustment? No medication adjustments needed.         Reviewed the following instructions with the patient:    Paxlovid (nimatrelvir and ritonavir)    How it works  Two medicines (nirmatrelvir and ritonavir) are taken together. They stop the virus from growing. Less  amount of virus is easier for your body to fight.    How to take  Medicine comes in a daily container with both medicine tablets. Take by mouth twice daily (once in the morning, once at night) for 5 days.  The number of tablets to take varies by patient.  Don't chew or break capsules. Swallow whole.    When to take  Take as soon as possible after positive COVID-19 test result, and within 5 days of your first symptoms.    Possible side effects  Can cause altered sense of taste, diarrhea (loose, watery stools), high blood pressure, muscle aches.    Zaheer Middleton RN       Reason for Disposition   COVID-19 diagnosed by positive lab test (e.g., PCR, rapid self-test kit) and mild symptoms (e.g., cough, fever, others) and no complications or SOB    Additional Information   Negative: SEVERE difficulty breathing (e.g., struggling for each breath, speaks in single words)   Negative: Difficult to awaken or acting confused (e.g., disoriented, slurred speech)   Negative: Bluish (or gray) lips or face now   Negative: Shock suspected (e.g., cold/pale/clammy skin, too weak to stand, low BP, rapid pulse)   Negative: Sounds like a life-threatening emergency to the triager   Negative: SEVERE or constant chest pain or pressure  (Exception: Mild central chest pain, present only when coughing.)   Negative: MODERATE difficulty breathing (e.g., speaks in phrases, SOB even at rest, pulse 100-120)   Negative: Headache and stiff neck (can't touch chin to chest)   Negative: Oxygen level (e.g., pulse oximetry) 90% or lower   Negative: Chest pain or pressure  (Exception: MILD central chest pain, present only when coughing.)   Negative: Drinking very little and dehydration suspected (e.g., no urine > 12 hours, very dry mouth, very lightheaded)   Negative: Patient sounds very sick or weak to the triager   Negative: MILD difficulty breathing (e.g., minimal/no SOB at rest, SOB with walking, pulse <100)   Negative: Fever > 103 F (39.4 C)    Negative: Fever > 101 F (38.3 C) and over 60 years of age   Negative: Fever > 100.0 F (37.8 C) and bedridden (e.g., CVA, chronic illness, recovering from surgery)   Negative: HIGH RISK patient (e.g., weak immune system, age > 64 years, obesity with BMI of 30 or higher, pregnant, chronic lung disease or other chronic medical condition) and COVID symptoms (e.g., cough, fever)  (Exceptions: Already seen by doctor or NP/PA and no new or worsening symptoms.)   Negative: HIGH RISK patient and influenza is widespread in the community and ONE OR MORE respiratory symptoms: cough, sore throat, runny or stuffy nose   Negative: HIGH RISK patient and influenza exposure within the last 7 days and ONE OR MORE respiratory symptoms: cough, sore throat, runny or stuffy nose   Negative: Oxygen level (e.g., pulse oximetry) 91 to 94%   Negative: COVID-19 infection suspected by caller or triager and mild symptoms (cough, fever, or others) and negative COVID-19 rapid test   Negative: Fever present > 3 days (72 hours)   Negative: Fever returns after gone for over 24 hours and symptoms worse or not improved   Negative: Continuous (nonstop) coughing interferes with work or school and no improvement using cough treatment per Care Advice   Negative: Cough present > 3 weeks   Negative: COVID-19 diagnosed by positive lab test (e.g., PCR, rapid self-test kit) and NO symptoms (e.g., cough, fever, others)    Protocols used: Coronavirus (COVID-19) Diagnosed or [id number]

## 2023-12-05 NOTE — TELEPHONE ENCOUNTER
Pt calling stating that she tested pos for Covid yesterday. Symptoms began on Saturday. Pt is not having emergent concerns such as SOB or chest pain.    Pt would like Paxlovid if able.     Jennifer Reddy RN  [facility]

## 2023-12-13 ENCOUNTER — THERAPY VISIT (OUTPATIENT)
Dept: PHYSICAL THERAPY | Facility: CLINIC | Age: 43
End: 2023-12-13
Payer: COMMERCIAL

## 2023-12-13 DIAGNOSIS — N39.3 SUI (STRESS URINARY INCONTINENCE, FEMALE): Primary | ICD-10-CM

## 2023-12-13 PROCEDURE — 97110 THERAPEUTIC EXERCISES: CPT | Mod: GP | Performed by: PHYSICAL THERAPIST

## 2023-12-28 ENCOUNTER — TELEPHONE (OUTPATIENT)
Dept: GASTROENTEROLOGY | Facility: CLINIC | Age: 43
End: 2023-12-28
Payer: COMMERCIAL

## 2023-12-28 DIAGNOSIS — K92.1 BLOOD IN STOOL: Primary | ICD-10-CM

## 2023-12-28 RX ORDER — BISACODYL 5 MG/1
TABLET, DELAYED RELEASE ORAL
Qty: 4 TABLET | Refills: 0 | Status: SHIPPED | OUTPATIENT
Start: 2023-12-28

## 2023-12-28 NOTE — TELEPHONE ENCOUNTER
Attempted to contact patient in order to complete pre assessment questions.     No answer. Left message to return call to [phone] option 4      Procedure details:    Patient scheduled for Colonoscopy  on 1.10.24.     Arrival time: 0915. Procedure time 1015    Pre op exam needed? N/A    Facility location: [facility]; [address], [address], [place] [address]    Sedation type: Conscious sedation     Indication for procedure:   Blood in stool [K92.1]   Irritable bowel syndrome with both constipation and diarrhea [K58.2]         Chart review:     Electronic implanted devices? No    Recent diagnosis of diverticulitis within the last 6 weeks? No    Diabetic? No      Medication review:    Anticoagulants? No    NSAIDS? No NSAID medications per patient's medication list.  RN will verify with pre-assessment call.    Other medication HOLDING recommendations:  N/A      Prep for procedure:     Bowel prep recommendation: Extended Golytely   Due to: BMI > 40.     Prep instructions sent via [employer]. Bowel prep script sent to    [facility] [address] [address] [place], [place] - [facility]      Ira Hay RN  Endoscopy Procedure Pre Assessment RN

## 2024-01-02 ENCOUNTER — OFFICE VISIT (OUTPATIENT)
Dept: DERMATOLOGY | Facility: CLINIC | Age: 44
End: 2024-01-02
Payer: COMMERCIAL

## 2024-01-02 DIAGNOSIS — L85.8 KP (KERATOSIS PILARIS): ICD-10-CM

## 2024-01-02 DIAGNOSIS — L81.4 SOLAR LENTIGO: ICD-10-CM

## 2024-01-02 DIAGNOSIS — L82.1 SEBORRHEIC KERATOSIS: ICD-10-CM

## 2024-01-02 DIAGNOSIS — D22.9 MULTIPLE BENIGN NEVI: ICD-10-CM

## 2024-01-02 DIAGNOSIS — D18.01 CHERRY ANGIOMA: Primary | ICD-10-CM

## 2024-01-02 DIAGNOSIS — L24.9 IRRITANT CONTACT DERMATITIS, UNSPECIFIED TRIGGER: ICD-10-CM

## 2024-01-02 PROCEDURE — 99214 OFFICE O/P EST MOD 30 MIN: CPT | Performed by: DERMATOLOGY

## 2024-01-02 RX ORDER — FLUOCINONIDE 0.5 MG/G
CREAM TOPICAL 2 TIMES DAILY
Qty: 60 G | Refills: 4 | Status: SHIPPED | OUTPATIENT
Start: 2024-01-02

## 2024-01-02 ASSESSMENT — PAIN SCALES - GENERAL: PAINLEVEL: NO PAIN (0)

## 2024-01-02 NOTE — LETTER
1/2/2024       RE: Karolina Herrera  [address]  [facility] [address]     Dear Colleague,    Thank you for referring your patient, Karolina Herrera, to the [facility] [place] [facility]. Please see a copy of my visit note below.    [facility] Dermatology Note  Encounter Date: Jan 2, 2024  Office Visit     Dermatology Problem List:  1. Skin cancer screening 1/2/24  2. Hand eczema, emollients, gentle skin cares  3. Heel cracking, bilateral  4. Xerosis, mild-to-moderate in BL LE  5. Irritant contact dermatitis  - start Lidex 0.05% cream   - Moisturizer: Eucerin urea repair or amlactin  6. Keratosis pilaris  - Moisturizer: Eucerin urea repair or amlactin    Fhx: mother had NMSC  ____________________________________________    Assessment & Plan:    #. Irritant contact dermatitis, unspecified trigger   - Counseled about dry skin care techniques.  - Moisturizer: Recommended using Eucerin urea repair cream or amlactin for the dry and cracking areas.   - Start Lidex 0.05% external cream    #. Keratosis pilaris    - dry skin care techniques  - Moisturizer: Recommended using Eucerin urea repair cream or amlactin for the dry and cracking areas.     # Benign lesions: Seborrheic keratoses, lentigines, cherry angioma, and multiple benign nevi  - Reassurance provided; no treatment is medically necessary    Procedures Performed:   None.     Follow-up: 1 year(s) in-person, or earlier for new or changing lesions     Staff and Scribe:     Scribe Disclosure:   I, BEATRICE LINDSEY, am serving as a scribe; to document services personally performed by Raymundo Silva MD-based on data collection and the provider's statements to me.      Staff attestation:  The documentation recorded by the scribe accurately reflects the services I personally performed and the decisions I personally made. I have made edits where needed.    Raymundo  Quentin Silva MD  Staff Dermatologist and Dermatopathologist  , Department of Dermatology   ____________________________________________    CC: Skin Check (FBSE.  No concerns. )    HPI:  Ms. Karolina Herrera is a(n) 43 year old female who presents today as a return patient for a FBSE. Last seen in dermatology by Dr. Gatica, on 10/10/22, for bilateral heel cracking and xerosis with keratoses, for which she was given amlactin samples.    Today, she reports her skin is dry and she sometimes gets cracks in her hands. She mentions using Lubriderm lotion and applies neosporin and band aids, she has not tried steroid medicines in the past.     No history of pancreatic cancer Patient is otherwise feeling well, without additional skin concerns.    Labs Reviewed:  N/A    Physical Exam:  Vitals: There were no vitals taken for this visit.  SKIN: Full skin, which includes the head/face, both arms, chest, back, abdomen,both legs, genitalia and/or groin buttocks, digits and/or nails, was examined.  - 4 mm atrophic pink to purple papule on the left shoulder   - bilateral heels with moderate callus around base of heel. One fissure without erythema at base in right heel. No erythema, swelling, or ulcers on exam.  - diffuse, mild-to-moderate xerosis on bilateral lower extremities to knees. Right knee with two flesh-colored, dry keratoses  - minimal xerosis on radial and ulnar edges of palm  - There are dome shaped bright red papules on the trunk.   - Multiple regular brown pigmented macules and papules are identified on the trunk and extremities.   - Scattered brown macules on sun exposed areas.  - There are waxy stuck on tan to brown papules on the trunk.   - No other lesions of concern on areas examined.     Medications:  Current Outpatient Medications   Medication    bisacodyl (DULCOLAX) 5 MG EC tablet    escitalopram (LEXAPRO) 10 MG tablet    nystatin (MYCOSTATIN) 509590 UNIT/GM external powder     polyethylene glycol (GOLYTELY) 236 g suspension     No current facility-administered medications for this visit.      Past Medical History:   Patient Active Problem List   Diagnosis    Family history of malignant neoplasm of breast    PCOS (polycystic ovarian syndrome)    Irritable bowel syndrome without diarrhea    ACP (advance care planning)    History of gestational diabetes    Vitamin D deficiency    Irritable bowel syndrome    EULA (stress urinary incontinence, female)    Pelvic floor weakness     Past Medical History:   Diagnosis Date    Abnormal vaginal bleeding 2008 and 2009    Amblyopia     At high risk for breast cancer     Encounter for gynecological examination without abnormal finding 10/19/2015    Overview:  Wife is Peyton.  Both are . Pap neg 6/10/ 2013: record in Care Everywhere from [facility]     Family history of malignant neoplasm of breast     High risk pregnancy, antepartum 03/30/2018    Overview:  Prenatal provider & planned delivery site: [facility] Wife is Peyton.   IVF through [facility] with donor from sperm bank.  Same donor as first pregnancy with their son Huy.  Labs and records requested from [facility].   Initial prenatal bloodwork ___  GC/CT ___- These labs were deferred to wait for records from [facility].   Pregravid BMI >40.  Early GCT ____ UC done.  Pap up to date 7/17 Geneti    Low back pain 06/18/2014    Morbid obesity due to excess calories (H) 03/25/2016        CC No referring provider defined for this encounter. on close of this encounter.

## 2024-01-02 NOTE — PATIENT INSTRUCTIONS
Recommendations for dry skin and dermatitis   1. Bathe or shower daily in lukewarm water  2. Use a gentle non-soap detergent cleanser  - Soaps are alkaline (which can irritate sensitive skin) and remove natural moisturizing factors   - Recommended products, in no particular order, include:   - Bars:    - Aveeno Moisturizing Bar    - Cetaphil Gentle Cleansing Bar    - Dove Sensitive Skin Unscented Beauty Bar    - Olay Ultra Moisture Bar   - Liquid Cleansers:    - Aquanil Cleanser    - CeraVe Hydrating Cleanser    - Cetaphil Gentle Skin Cleanser  - Avoid scented soaps or bath additives unless your doctor tells you otherwise  - Focus on washing the face, underarms, and underwear areas; other sites usually do not need frequent washing  3. Rinse off thoroughly, then pat dry until skin is slightly damp  4. Apply moisturizer to damp skin within 3-5 minutes of exiting the bath/shower  - Recommended products, in no particular order, include:   - Lotions (thinner/lighter, but may be less effective)    - AmLactin Cerapeutic Restoring Body Lotion    - CeraVe Facial Moisturizing Lotion (AM and/or PM)    - Lubriderm Advanced Therapy Lotion   - Creams (thicker, likely the best balance of effectiveness and feel)    - Eucerin urea repair cream  - AmLactin Ultra Hydrating Body Cream    - Aveeno Eczema Therapy Moisturizing Cream    - Aveeno Eczema Therapy Itch Relief Balm    - CeraVe Itch Relief Moisturizing Cream   - Ointments (thickest)    - Vaseline  5. If prescribed a topical steroid medication, this may be applied before or after the moisturizer (whichever order you prefer)  6. Reapply moisturizer one or two additional times throughout the day when dry skin is present; once this improves, reduce to daily or every other day as needed to prevent recurrence  7. If dry skin or dermatitis is present on the hands, keep moisturizer near the sink and apply after washing and drying your hands  8. A humidifier may be helpful during the  winter months (when ambient humidity is very low)

## 2024-01-02 NOTE — PROGRESS NOTES
[facility] Dermatology Note  Encounter Date: Jan 2, 2024  Office Visit     Dermatology Problem List:  1. Skin cancer screening 1/2/24  2. Hand eczema, emollients, gentle skin cares  3. Heel cracking, bilateral  4. Xerosis, mild-to-moderate in BL LE  5. Irritant contact dermatitis  - start Lidex 0.05% cream   - Moisturizer: Eucerin urea repair or amlactin  6. Keratosis pilaris  - Moisturizer: Eucerin urea repair or amlactin    Fhx: mother had NMSC  ____________________________________________    Assessment & Plan:    #. Irritant contact dermatitis, unspecified trigger   - Counseled about dry skin care techniques.  - Moisturizer: Recommended using Eucerin urea repair cream or amlactin for the dry and cracking areas.   - Start Lidex 0.05% external cream    #. Keratosis pilaris    - dry skin care techniques  - Moisturizer: Recommended using Eucerin urea repair cream or amlactin for the dry and cracking areas.     # Benign lesions: Seborrheic keratoses, lentigines, cherry angioma, and multiple benign nevi  - Reassurance provided; no treatment is medically necessary    Procedures Performed:   None.     Follow-up: 1 year(s) in-person, or earlier for new or changing lesions     Staff and Scribe:     Scribe Disclosure:   IBEATRICE, am serving as a scribe; to document services personally performed by Raymundo Silva MD-based on data collection and the provider's statements to me.      Staff attestation:  The documentation recorded by the scribe accurately reflects the services I personally performed and the decisions I personally made. I have made edits where needed.    Raymundo Silva MD  Staff Dermatologist and Dermatopathologist  , Department of Dermatology   ____________________________________________    CC: Skin Check (FBSE.  No concerns. )    HPI:  Ms. Karolina Herrera is a(n) 43 year old female who presents today as a return patient for a FBSE. Last seen in  dermatology by Dr. Gatica, on 10/10/22, for bilateral heel cracking and xerosis with keratoses, for which she was given amlactin samples.    Today, she reports her skin is dry and she sometimes gets cracks in her hands. She mentions using Lubriderm lotion and applies neosporin and band aids, she has not tried steroid medicines in the past.     No history of pancreatic cancer Patient is otherwise feeling well, without additional skin concerns.    Labs Reviewed:  N/A    Physical Exam:  Vitals: There were no vitals taken for this visit.  SKIN: Full skin, which includes the head/face, both arms, chest, back, abdomen,both legs, genitalia and/or groin buttocks, digits and/or nails, was examined.  - 4 mm atrophic pink to purple papule on the left shoulder   - bilateral heels with moderate callus around base of heel. One fissure without erythema at base in right heel. No erythema, swelling, or ulcers on exam.  - diffuse, mild-to-moderate xerosis on bilateral lower extremities to knees. Right knee with two flesh-colored, dry keratoses  - minimal xerosis on radial and ulnar edges of palm  - There are dome shaped bright red papules on the trunk.   - Multiple regular brown pigmented macules and papules are identified on the trunk and extremities.   - Scattered brown macules on sun exposed areas.  - There are waxy stuck on tan to brown papules on the trunk.   - No other lesions of concern on areas examined.     Medications:  Current Outpatient Medications   Medication     bisacodyl (DULCOLAX) 5 MG EC tablet     escitalopram (LEXAPRO) 10 MG tablet     nystatin (MYCOSTATIN) 016321 UNIT/GM external powder     polyethylene glycol (GOLYTELY) 236 g suspension     No current facility-administered medications for this visit.      Past Medical History:   Patient Active Problem List   Diagnosis     Family history of malignant neoplasm of breast     PCOS (polycystic ovarian syndrome)     Irritable bowel syndrome without diarrhea     ACP  (advance care planning)     History of gestational diabetes     Vitamin D deficiency     Irritable bowel syndrome     EULA (stress urinary incontinence, female)     Pelvic floor weakness     Past Medical History:   Diagnosis Date     Abnormal vaginal bleeding 2008 and 2009     Amblyopia      At high risk for breast cancer      Encounter for gynecological examination without abnormal finding 10/19/2015    Overview:  Wife is Peyton.  Both are . Pap neg 6/10/ 2013: record in Care Everywhere from [facility]      Family history of malignant neoplasm of breast      High risk pregnancy, antepartum 03/30/2018    Overview:  Prenatal provider & planned delivery site: [facility] [place] [facility] Wife is Peyton.   IVF through [facility] with donor from sperm bank.  Same donor as first pregnancy with their son Huy.  Labs and records requested from [facility].   Initial prenatal bloodwork ___  GC/CT ___- These labs were deferred to wait for records from [facility].   Pregravid BMI >40.  Early GCT ____ UC done.  Pap up to date 7/17 Geneti     Low back pain 06/18/2014     Morbid obesity due to excess calories (H) 03/25/2016        CC No referring provider defined for this encounter. on close of this encounter.

## 2024-01-02 NOTE — NURSING NOTE
Dermatology Rooming Note    Karolina Herrera's goals for this visit include:   Chief Complaint   Patient presents with    Skin Check     FBSE.  No concerns.      Vidya Hernandez CMA

## 2024-01-03 ENCOUNTER — TELEPHONE (OUTPATIENT)
Dept: GASTROENTEROLOGY | Facility: CLINIC | Age: 44
End: 2024-01-03

## 2024-01-03 ENCOUNTER — THERAPY VISIT (OUTPATIENT)
Dept: PHYSICAL THERAPY | Facility: CLINIC | Age: 44
End: 2024-01-03
Payer: COMMERCIAL

## 2024-01-03 DIAGNOSIS — N39.3 SUI (STRESS URINARY INCONTINENCE, FEMALE): Primary | ICD-10-CM

## 2024-01-03 DIAGNOSIS — K92.1 BLOOD IN STOOL: Primary | ICD-10-CM

## 2024-01-03 PROCEDURE — 97110 THERAPEUTIC EXERCISES: CPT | Mod: GP | Performed by: PHYSICAL THERAPIST

## 2024-01-03 PROCEDURE — 97530 THERAPEUTIC ACTIVITIES: CPT | Mod: GP | Performed by: PHYSICAL THERAPIST

## 2024-01-03 NOTE — TELEPHONE ENCOUNTER
Caller: Karolina  Reason for Reschedule/Cancellation (please be detailed, any staff messages or encounters to note?): patient needs different date      Prior to reschedule please review:  Ordering Provider: Carlitos  Sedation per order: CS  Does patient have any ASC Exclusions, please identify?: Yes-GILDARDO       Notes on Cancelled Procedure:  Procedure: Lower Endoscopy [Colonoscopy]   Date: 1/10/24  Location: [facility]; [address] [place] [address]  Surgeon: Chidi      Rescheduled: Yes  Procedure: Lower Endoscopy [Colonoscopy]   Date: 4/17/24  Location: [facility]; [address] [place] [address]  Surgeon: All  Sedation Level Scheduled  CS,  Reason for Sedation Level Protocol  Prep/Instructions updated and sent: Yes       Send In - basket message to Panc - Bobby Pool if EUS  procedure is canceled or rescheduled: [ N/A, YES or NO] NA

## 2024-01-11 ENCOUNTER — THERAPY VISIT (OUTPATIENT)
Dept: PHYSICAL THERAPY | Facility: CLINIC | Age: 44
End: 2024-01-11
Payer: COMMERCIAL

## 2024-01-11 DIAGNOSIS — N39.3 SUI (STRESS URINARY INCONTINENCE, FEMALE): Primary | ICD-10-CM

## 2024-01-11 PROCEDURE — 97530 THERAPEUTIC ACTIVITIES: CPT | Mod: GP | Performed by: PHYSICAL THERAPIST

## 2024-01-11 PROCEDURE — 97110 THERAPEUTIC EXERCISES: CPT | Mod: GP | Performed by: PHYSICAL THERAPIST

## 2024-01-19 PROBLEM — E66.813: Status: ACTIVE | Noted: 2018-03-30

## 2024-01-19 PROBLEM — O09.90 HIGH RISK PREGNANCY, ANTEPARTUM: Status: ACTIVE | Noted: 2018-03-30

## 2024-01-19 PROBLEM — O24.419 GDM, CLASS A2: Status: ACTIVE | Noted: 2018-08-24

## 2024-01-19 PROBLEM — O14.93 PRE-ECLAMPSIA IN THIRD TRIMESTER: Status: ACTIVE | Noted: 2018-11-05

## 2024-01-19 PROBLEM — E66.1: Status: ACTIVE | Noted: 2018-03-30

## 2024-01-19 NOTE — PROGRESS NOTES
Oncology Risk Management Consultation:  Date on this visit: 2024    Karolina Herrera  requires heightened screening and surveillance for her higher risk of breast cancer, secondary to a  family history of invasive ductal carcinoma of the breast in her sister at age 39.  She is considered to be at high risk for breast cancer and has a 24.4% lifetime risk for breast cancer by the TIEN model. Her risk within the next 5 years is 1.6% by TIEN.     Primary Physician:  PELON Lopez     History Of Present Illness:  Ms. Herrera is a very pleasant, healthy 44 year old female who presents with a family history of breast cancer.      Genetic testing:  No genetic testing to date. Her sister, who had invasive ductal carcinoma at age 39, by report, was negative for genetic mutations in the BRCA1 and BRCA2 genes and Karolina has not had genetic testing.      Pertinent health history:    Menarche at 12.  First child at age 35, conceived using one cycle of IVF.   Premenopausal.  Ovaries and uterus intact.   OCP use for one year.  Polycystic ovarian syndrome  Scattered fibroglandular densities.  Hx of mastitis in  with breastfeeding, resolved with antibiotics.  Hx of right breast pain with lactation. No infection 2019; treated with tylenol and ibuprofen. Suspected clogged milk duct.  History of breastfeeding x 13 months for Huy and 4-5 months for Celia   History of breast biopsy 1 breast biopsy in 2022: negative for atypia or malignancy  No history of hyperplasia, atypia or malignancy     Pertinent Screening History:  2016 - Screening mammogram, BiRads1  2017 - Breast MRI, BiRads1  2017 - Screening mammogram, BiRads1  2019 - Breast MRI, BiRads1  2020- Screening tomosynthesis mammogram, BiRads1  2021- Screening tomosynthesis mammogram, BiRads1  2021- Breast MRI,  BiRads1  2022:  Screening tomosynthesis mammogram, BiRads1  2022: Breast MRI-  BiRads4  12/15/2022: MRI guided biopsy- negative for atypia or malignancy  6/23/2023: Breast MRI: BiRads2    At this visit, she denies asymmetry, lumps, masses, thickening, nipple discharge and skin changes.  She notes intermittent left breast pain, which self resolves without intervention.  She describes it as primarily on the lateral side but also superior to the nipple; notes that it sometimes is tender to the touch, without erythema, open wound or tingling. We discussed that diet, specifically caffeine intake has been known to cause breast pain. Karolina reports minimal caffeine intake, but will pay attention the next time she has caffeine to see if she can correlate symptoms.     Past Medical/Surgical History:  Past Medical History:   Diagnosis Date    Abnormal vaginal bleeding 2008 and 2009    Amblyopia     At high risk for breast cancer     Encounter for gynecological examination without abnormal finding 10/19/2015    Overview:  Wife is Peyton.  Both are . Pap neg 6/10/ 2013: record in Care Everywhere from [facility]     Family history of malignant neoplasm of breast     High risk pregnancy, antepartum 03/30/2018    Overview:  Prenatal provider & planned delivery site: [facility] [place] [facility] Wife is Peyton.   IVF through [facility] with donor from sperm bank.  Same donor as first pregnancy with their son Huy.  Labs and records requested from [facility].   Initial prenatal bloodwork ___  GC/CT ___- These labs were deferred to wait for records from [facility].   Pregravid BMI >40.  Early GCT ____ UC done.  Pap up to date 7/17 Geneti    Low back pain 06/18/2014    Morbid obesity due to excess calories (H) 03/25/2016     Past Surgical History:   Procedure Laterality Date    GYN SURGERY  07/2014    Polypectomy    IVS  2015    invitro fertilization    LAPAROSCOPIC CHOLECYSTECTOMY N/A 11/07/2016    Procedure: LAPAROSCOPIC CHOLECYSTECTOMY;  Surgeon: Juan F Sherman MD;  [facility]    wisdom teeth removed      [facility] SLEEP STUDY,  UNATTENDED, RECORD HEART RATE/O2 SAT/RESP ANAL/SLEEP TM  3/7/2022            Allergies:  Allergies as of 2024    (No Known Allergies)       Current Medications:  Current Outpatient Medications   Medication Sig Dispense Refill    escitalopram (LEXAPRO) 10 MG tablet Take 1 tablet (10 mg) by mouth daily      bisacodyl (DULCOLAX) 5 MG EC tablet Two days prior to exam take two (2) tablets at 4pm. One day prior to exam take two (2) tablets at 4pm 4 tablet 0    fluocinonide (LIDEX) 0.05 % external cream Apply topically 2 times daily To hands and feet for dermatitis/cracking/fissuring 60 g 4    nystatin (MYCOSTATIN) 893741 UNIT/GM external powder Apply topically 3 times daily 120 g 3    polyethylene glycol (GOLYTELY) 236 g suspension Take as directed. Two days before your exam fill the first container with water. Cover and shake until mixed well. At 5:00pm drink one 8oz glass every 10-15 minutes until half (1/2) of the first container is empty. Store the remainder in the refrigerator. One day before your exam at 5:00pm drink the second half of the first container until it is gone. Before you go to bed mix the second container with water and put in refrigerator. Six hours before your check in time drink one 8oz glass every 10-15 minutes until half of container is empty. Discard the remainder of solution. 8000 mL 0        Family History:  Family History   Problem Relation Age of Onset    Skin Cancer Mother     Glaucoma Father     Atrial fibrillation Father     Breast Cancer Sister 39        invasive ductal carcinoma, treated with lumpectomy, radiation and tamoxifen    Other - See Comments Brother          of murder    Esophageal Cancer Maternal Grandfather          in 60s; hx of smoking    Bladder Cancer Paternal Grandfather          in 80s    No Known Problems Daughter     Attention Deficit Disorder Daughter     Breast Cancer Paternal Aunt         paternal great aunt in 70s    Macular Degeneration No family  hx of        Social History:  Social History     Socioeconomic History    Marital status:      Spouse name: Samantha    Number of children: 2    Years of education: Not on file    Highest education level: Not on file   Occupational History     Comment: works for [employer]   Tobacco Use    Smoking status: Never    Smokeless tobacco: Never   Vaping Use    Vaping Use: Never used   Substance and Sexual Activity    Alcohol use: Yes     Comment: 1-2 per week or less    Drug use: No    Sexual activity: Yes     Partners: Female     Birth control/protection: None   Other Topics Concern     Service No    Blood Transfusions No    Caffeine Concern No    Occupational Exposure No    Hobby Hazards No    Sleep Concern No    Stress Concern No    Weight Concern No    Special Diet No    Back Care No    Exercise Yes     Comment: walks dog 2-3 times/day; prenatal exercises    Bike Helmet Not Asked    Seat Belt Yes    Self-Exams Yes    Parent/sibling w/ CABG, MI or angioplasty before 65F 55M? No   Social History Narrative    Not on file     Social Determinants of Health     Financial Resource Strain: Low Risk  (10/17/2023)    Financial Resource Strain     Within the past 12 months, have you or your family members you live with been unable to get utilities (heat, electricity) when it was really needed?: No   Food Insecurity: Low Risk  (10/17/2023)    Food Insecurity     Within the past 12 months, did you worry that your food would run out before you got money to buy more?: No     Within the past 12 months, did the food you bought just not last and you didn t have money to get more?: No   Transportation Needs: Low Risk  (10/17/2023)    Transportation Needs     Within the past 12 months, has lack of transportation kept you from medical appointments, getting your medicines, non-medical meetings or appointments, work, or from getting things that you need?: No   Physical Activity: Not on file   Stress: Not on file    Social Connections: Not on file   Interpersonal Safety: Low Risk  (10/17/2023)    Interpersonal Safety     Do you feel physically and emotionally safe where you currently live?: Yes     Within the past 12 months, have you been hit, slapped, kicked or otherwise physically hurt by someone?: No     Within the past 12 months, have you been humiliated or emotionally abused in other ways by your partner or ex-partner?: No   Housing Stability: Low Risk  (10/17/2023)    Housing Stability     Do you have housing? : Yes     Are you worried about losing your housing?: No       Physical Exam:  /80 (BP Location: Left arm, Patient Position: Sitting, Cuff Size: Adult Large)   Pulse 86   Temp 97.2  F (36.2  C) (Oral)   Resp 18   Wt 142.1 kg (313 lb 3.2 oz)   SpO2 97%   BMI 48.87 kg/m       NECK: no adenopathy, no asymmetry or masses    GENERAL APPEARANCE: healthy, alert and no apparent distress    BREAST: A multipositional, bilateral breast exam was performed.  Fairly symmetrical, left slightly > right. Right breast: no palpable dominant masses, no nipple discharge, no skin changes.  Right axilla: no palpable adenopathy. Left breast: no palpable dominant masses, no nipple discharge, no skin changes. Left axilla: no palpable adenopathy.   LYMPHATICS: No cervical, supraclavicular, or axillary lymphadenopathy    SKIN: no suspicious lesions or rashes on observed skin    Laboratory/Imaging Studies  No results found for any visits on 01/23/24.    ASSESSMENT  We discussed her last screening tests and reviewed results.  She had a left breast biopsy in December 2022 which was negative for atypia. We discussed the length of high risk breast screening, which at this time is recommended until age 75, but can be individualized. We also discussed tamoxifen. Karolina's sister is currently taking this medication and she wondered if it would be recommended for her. Karolina's five year risk by the Silva model is 1.6% and risk-reducing  tamoxifen is recommended for people with a five year risk of 1.7% or greater. We will discuss risk reducing medications in the future when Karolina would qualify and get the most benefit.     Individualized Surveillance Plan for women  With 20% or greater lifetime risk of breast cancer   Per NCCN Breast Cancer Screening and Diagnosis Guidelines Version 1.2023   Recommended screening Test or procedure Last done Next Scheduled    Clinical encounter Clinical exam every 6-12 months.   Refer to genetic counseling if not already done.  Consider risk reduction strategies.   1/23/2024 January 2025   However, some family histories with breast cancers at a very young age, may warrant screening starting earlier.    *May begin at age 40 if breast cancers in the family occur at later ages.    Annual mammogram beginning 10 years younger than the earliest breast cancer in the family but not prior to age 30.    Recommend annual breast MRI to begin 10 years younger than the earliest breast cancer in the family but not prior to age 25.    Breast MRIs are preferably done on day 7-15 of the menstrual cycle in premenopausal women. Breast biopsy in December 2022- negative for atypia    Breast MRI 6/23/2023 Mammogram today    Breast MRI in June (6/24 or later)    Return to clinic in January 2025 with Mary Merritt   Breast screening for patients at high risk due to thoracic radiation between the ages of 10-30   Annual clinical exam beginning 8 years after radiation therapy.    Annual screening mammogram beginning at age 30 or 8 years after radiation therapy    Annual breast MRI, beginning at age 25 or 8 years after radiation therapy.     NA   NA   Women who have a lifetime risk of >20% based on history of LCIS or ADH/ALH Annual screening mammogram beginning at age of LCIS or ADH/ALH but not prior to age 30.    Consider annual MRI to begin at age of diagnosis of LCIS or ADH/ALH but not prior to age 25.    Consider risk reducing  strategies.   NA   NA    Recommend risk reducing strategies for women with 1.7% 5 year risk of breast cancer.         I spent a total of 32 minutes on the day of the visit. Please see the note for further information on patient assessment and treatment.     Mary Merritt, FADUMO, APRN, AGCNS-BC  Clinical Nurse Specialist  Cancer Risk Management Program  [facility]    The patient was seen in conjunction with me today.  I agree with the exam assessment and  am in agreement with the plan.    MIKEL Richey-CNS, OCN, AGN-BC  Clinical Nurse Specialist  Cancer Risk Management Program  [facility]      Cc: MIKEL Maurer, CNP

## 2024-01-23 ENCOUNTER — ONCOLOGY VISIT (OUTPATIENT)
Dept: ONCOLOGY | Facility: CLINIC | Age: 44
End: 2024-01-23
Attending: CLINICAL NURSE SPECIALIST
Payer: COMMERCIAL

## 2024-01-23 ENCOUNTER — ANCILLARY PROCEDURE (OUTPATIENT)
Dept: MAMMOGRAPHY | Facility: CLINIC | Age: 44
End: 2024-01-23
Attending: CLINICAL NURSE SPECIALIST
Payer: COMMERCIAL

## 2024-01-23 VITALS
HEART RATE: 86 BPM | OXYGEN SATURATION: 97 % | TEMPERATURE: 97.2 F | WEIGHT: 293 LBS | DIASTOLIC BLOOD PRESSURE: 80 MMHG | RESPIRATION RATE: 18 BRPM | SYSTOLIC BLOOD PRESSURE: 121 MMHG | BODY MASS INDEX: 48.87 KG/M2

## 2024-01-23 DIAGNOSIS — Z80.3 FAMILY HISTORY OF MALIGNANT NEOPLASM OF BREAST: ICD-10-CM

## 2024-01-23 DIAGNOSIS — Z12.39 BREAST CANCER SCREENING, HIGH RISK PATIENT: ICD-10-CM

## 2024-01-23 DIAGNOSIS — Z12.39 BREAST CANCER SCREENING, HIGH RISK PATIENT: Primary | ICD-10-CM

## 2024-01-23 PROCEDURE — 77067 SCR MAMMO BI INCL CAD: CPT | Performed by: RADIOLOGY

## 2024-01-23 PROCEDURE — 99213 OFFICE O/P EST LOW 20 MIN: CPT | Performed by: CLINICAL NURSE SPECIALIST

## 2024-01-23 PROCEDURE — 99214 OFFICE O/P EST MOD 30 MIN: CPT | Performed by: CLINICAL NURSE SPECIALIST

## 2024-01-23 PROCEDURE — 77063 BREAST TOMOSYNTHESIS BI: CPT | Performed by: RADIOLOGY

## 2024-01-23 ASSESSMENT — PAIN SCALES - GENERAL: PAINLEVEL: NO PAIN (0)

## 2024-01-23 NOTE — NURSING NOTE
"Oncology Rooming Note    January 23, 2024 2:29 PM   Karolina Herrera is a 44 year old female who presents for:    Chief Complaint   Patient presents with    Oncology Clinic Visit     At High Risk for Breast Cancer     Initial Vitals: /80 (BP Location: Left arm, Patient Position: Sitting, Cuff Size: Adult Large)   Pulse 86   Temp 97.2  F (36.2  C) (Oral)   Resp 18   Wt 142.1 kg (313 lb 3.2 oz)   SpO2 97%   BMI 48.87 kg/m   Estimated body mass index is 48.87 kg/m  as calculated from the following:    Height as of 10/17/23: 1.705 m (5' 7.13\").    Weight as of this encounter: 142.1 kg (313 lb 3.2 oz). Body surface area is 2.59 meters squared.  No Pain (0) Comment: Data Unavailable   No LMP recorded.  Allergies reviewed: Yes  Medications reviewed: Yes    Medications: Medication refills not needed today.  Pharmacy name entered into TUNJI:    [id number] [place] [facility] [place] [place] - [address] [id number] [place], [place] - [address] AT [address]    Frailty Screening:   Is the patient here for a new oncology consult visit in cancer care? 2. No      Clinical concerns: None       Megan Gardner LPN  1/23/2024              "

## 2024-01-23 NOTE — PATIENT INSTRUCTIONS
Individualized Surveillance Plan for women  With 20% or greater lifetime risk of breast cancer   Per NCCN Breast Cancer Screening and Diagnosis Guidelines Version 1.2023   Recommended screening Test or procedure Last done Next Scheduled    Clinical encounter Clinical exam every 6-12 months.   Refer to genetic counseling if not already done.  Consider risk reduction strategies.   1/23/2024 January 2025   However, some family histories with breast cancers at a very young age, may warrant screening starting earlier.    *May begin at age 40 if breast cancers in the family occur at later ages.    Annual mammogram beginning 10 years younger than the earliest breast cancer in the family but not prior to age 30.    Recommend annual breast MRI to begin 10 years younger than the earliest breast cancer in the family but not prior to age 25.    Breast MRIs are preferably done on day 7-15 of the menstrual cycle in premenopausal women. Breast biopsy in December 2022- negative for atypia    Breast MRI 6/23/2023 Mammogram today    Breast MRI in June (6/24 or later)    Return to clinic in January 2025 with Mary Merritt   Breast screening for patients at high risk due to thoracic radiation between the ages of 10-30   Annual clinical exam beginning 8 years after radiation therapy.    Annual screening mammogram beginning at age 30 or 8 years after radiation therapy    Annual breast MRI, beginning at age 25 or 8 years after radiation therapy.     NA   NA   Women who have a lifetime risk of >20% based on history of LCIS or ADH/ALH Annual screening mammogram beginning at age of LCIS or ADH/ALH but not prior to age 30.    Consider annual MRI to begin at age of diagnosis of LCIS or ADH/ALH but not prior to age 25.    Consider risk reducing strategies.   NA   NA    Recommend risk reducing strategies for women with 1.7% 5 year risk of breast cancer.

## 2024-01-23 NOTE — LETTER
2024         RE: Karolina Herrera  [address]  [facility] [address]        Dear Colleague,    Thank you for referring your patient, Karolina Herrera, to the [facility]. Please see a copy of my visit note below.    Oncology Risk Management Consultation:  Date on this visit: 2024    Karolina Herrera  requires heightened screening and surveillance for her higher risk of breast cancer, secondary to a  family history of invasive ductal carcinoma of the breast in her sister at age 39.  She is considered to be at high risk for breast cancer and has a 24.4% lifetime risk for breast cancer by the TIEN model. Her risk within the next 5 years is 1.6% by TIEN.     Primary Physician:  MIKEL Lopez-SHANNAN     History Of Present Illness:  Ms. Herrera is a very pleasant, healthy 44 year old female who presents with a family history of breast cancer.      Genetic testing:  No genetic testing to date. Her sister, who had invasive ductal carcinoma at age 39, by report, was negative for genetic mutations in the BRCA1 and BRCA2 genes and Karolina has not had genetic testing.      Pertinent health history:    Menarche at 12.  First child at age 35, conceived using one cycle of IVF.   Premenopausal.  Ovaries and uterus intact.   OCP use for one year.  Polycystic ovarian syndrome  Scattered fibroglandular densities.  Hx of mastitis in  with breastfeeding, resolved with antibiotics.  Hx of right breast pain with lactation. No infection 2019; treated with tylenol and ibuprofen. Suspected clogged milk duct.  History of breastfeeding x 13 months for Huy and 4-5 months for Celia   History of breast biopsy 1 breast biopsy in 2022: negative for atypia or malignancy  No history of hyperplasia, atypia or malignancy     Pertinent Screening History:  2016 - Screening mammogram, BiRads1  2017 - Breast MRI, BiRads1  2017 - Screening mammogram,  BiRads1  8/8/2019 - Breast MRI, BiRads1  2/7/2020- Screening tomosynthesis mammogram, BiRads1  4/23/2021- Screening tomosynthesis mammogram, BiRads1  11/29/2021- Breast MRI,  BiRads1  4/29/2022:  Screening tomosynthesis mammogram, BiRads1  12/9/2022: Breast MRI- BiRads4  12/15/2022: MRI guided biopsy- negative for atypia or malignancy  6/23/2023: Breast MRI: BiRads2    At this visit, she denies asymmetry, lumps, masses, thickening, nipple discharge and skin changes.  She notes intermittent left breast pain, which self resolves without intervention.  She describes it as primarily on the lateral side but also superior to the nipple; notes that it sometimes is tender to the touch, without erythema, open wound or tingling. We discussed that diet, specifically caffeine intake has been known to cause breast pain. Karolina reports minimal caffeine intake, but will pay attention the next time she has caffeine to see if she can correlate symptoms.     Past Medical/Surgical History:  Past Medical History:   Diagnosis Date    Abnormal vaginal bleeding 2008 and 2009    Amblyopia     At high risk for breast cancer     Encounter for gynecological examination without abnormal finding 10/19/2015    Overview:  Wife is Peyton.  Both are . Pap neg 6/10/ 2013: record in Care Everywhere from [place]     Family history of malignant neoplasm of breast     High risk pregnancy, antepartum 03/30/2018    Overview:  Prenatal provider & planned delivery site: [facility] Wife is Peyton.   IVF through [facility] with donor from sperm bank.  Same donor as first pregnancy with their son Huy.  Labs and records requested from [facility].   Initial prenatal bloodwork ___  GC/CT ___- These labs were deferred to wait for records from [facility].   Pregravid BMI >40.  Early GCT ____ UC done.  Pap up to date 7/17 Geneti    Low back pain 06/18/2014    Morbid obesity due to excess calories (H) 03/25/2016     Past Surgical History:   Procedure Laterality Date     GYN SURGERY  2014    Polypectomy    IVS  2015    invitro fertilization    LAPAROSCOPIC CHOLECYSTECTOMY N/A 2016    Procedure: LAPAROSCOPIC CHOLECYSTECTOMY;  Surgeon: Juan F Sherman MD;  [facility]    wisdom teeth removed      [facility] SLEEP STUDY, UNATTENDED, RECORD HEART RATE/O2 SAT/RESP ANAL/SLEEP TM  3/7/2022            Allergies:  Allergies as of 2024    (No Known Allergies)       Current Medications:  Current Outpatient Medications   Medication Sig Dispense Refill    escitalopram (LEXAPRO) 10 MG tablet Take 1 tablet (10 mg) by mouth daily      bisacodyl (DULCOLAX) 5 MG EC tablet Two days prior to exam take two (2) tablets at 4pm. One day prior to exam take two (2) tablets at 4pm 4 tablet 0    fluocinonide (LIDEX) 0.05 % external cream Apply topically 2 times daily To hands and feet for dermatitis/cracking/fissuring 60 g 4    nystatin (MYCOSTATIN) 915879 UNIT/GM external powder Apply topically 3 times daily 120 g 3    polyethylene glycol (GOLYTELY) 236 g suspension Take as directed. Two days before your exam fill the first container with water. Cover and shake until mixed well. At 5:00pm drink one 8oz glass every 10-15 minutes until half (1/2) of the first container is empty. Store the remainder in the refrigerator. One day before your exam at 5:00pm drink the second half of the first container until it is gone. Before you go to bed mix the second container with water and put in refrigerator. Six hours before your check in time drink one 8oz glass every 10-15 minutes until half of container is empty. Discard the remainder of solution. 8000 mL 0        Family History:  Family History   Problem Relation Age of Onset    Skin Cancer Mother     Glaucoma Father     Atrial fibrillation Father     Breast Cancer Sister 39        invasive ductal carcinoma, treated with lumpectomy, radiation and tamoxifen    Other - See Comments Brother          of murder    Esophageal Cancer Maternal Grandfather           in 60s; hx of smoking    Bladder Cancer Paternal Grandfather          in 80s    No Known Problems Daughter     Attention Deficit Disorder Daughter     Breast Cancer Paternal Aunt         paternal great aunt in 70s    Macular Degeneration No family hx of        Social History:  Social History     Socioeconomic History    Marital status:      Spouse name: Samantha    Number of children: 2    Years of education: Not on file    Highest education level: Not on file   Occupational History     Comment: works for [employer]   Tobacco Use    Smoking status: Never    Smokeless tobacco: Never   Vaping Use    Vaping Use: Never used   Substance and Sexual Activity    Alcohol use: Yes     Comment: 1-2 per week or less    Drug use: No    Sexual activity: Yes     Partners: Female     Birth control/protection: None   Other Topics Concern     Service No    Blood Transfusions No    Caffeine Concern No    Occupational Exposure No    Hobby Hazards No    Sleep Concern No    Stress Concern No    Weight Concern No    Special Diet No    Back Care No    Exercise Yes     Comment: walks dog 2-3 times/day; prenatal exercises    Bike Helmet Not Asked    Seat Belt Yes    Self-Exams Yes    Parent/sibling w/ CABG, MI or angioplasty before 65F 55M? No   Social History Narrative    Not on file     Social Determinants of Health     Financial Resource Strain: Low Risk  (10/17/2023)    Financial Resource Strain     Within the past 12 months, have you or your family members you live with been unable to get utilities (heat, electricity) when it was really needed?: No   Food Insecurity: Low Risk  (10/17/2023)    Food Insecurity     Within the past 12 months, did you worry that your food would run out before you got money to buy more?: No     Within the past 12 months, did the food you bought just not last and you didn t have money to get more?: No   Transportation Needs: Low Risk  (10/17/2023)    Transportation Needs      Within the past 12 months, has lack of transportation kept you from medical appointments, getting your medicines, non-medical meetings or appointments, work, or from getting things that you need?: No   Physical Activity: Not on file   Stress: Not on file   Social Connections: Not on file   Interpersonal Safety: Low Risk  (10/17/2023)    Interpersonal Safety     Do you feel physically and emotionally safe where you currently live?: Yes     Within the past 12 months, have you been hit, slapped, kicked or otherwise physically hurt by someone?: No     Within the past 12 months, have you been humiliated or emotionally abused in other ways by your partner or ex-partner?: No   Housing Stability: Low Risk  (10/17/2023)    Housing Stability     Do you have housing? : Yes     Are you worried about losing your housing?: No       Physical Exam:  /80 (BP Location: Left arm, Patient Position: Sitting, Cuff Size: Adult Large)   Pulse 86   Temp 97.2  F (36.2  C) (Oral)   Resp 18   Wt 142.1 kg (313 lb 3.2 oz)   SpO2 97%   BMI 48.87 kg/m       NECK: no adenopathy, no asymmetry or masses    GENERAL APPEARANCE: healthy, alert and no apparent distress    BREAST: A multipositional, bilateral breast exam was performed.  Fairly symmetrical, left slightly > right. Right breast: no palpable dominant masses, no nipple discharge, no skin changes.  Right axilla: no palpable adenopathy. Left breast: no palpable dominant masses, no nipple discharge, no skin changes. Left axilla: no palpable adenopathy.   LYMPHATICS: No cervical, supraclavicular, or axillary lymphadenopathy    SKIN: no suspicious lesions or rashes on observed skin    Laboratory/Imaging Studies  No results found for any visits on 01/23/24.    ASSESSMENT  We discussed her last screening tests and reviewed results.  She had a left breast biopsy in December 2022 which was negative for atypia. We discussed the length of high risk breast screening, which at this time is  recommended until age 75, but can be individualized. We also discussed tamoxifen. Karolina's sister is currently taking this medication and she wondered if it would be recommended for her. Karolina's five year risk by the Silva model is 1.6% and risk-reducing tamoxifen is recommended for people with a five year risk of 1.7% or greater. We will discuss risk reducing medications in the future when Karolina would qualify and get the most benefit.     Individualized Surveillance Plan for women  With 20% or greater lifetime risk of breast cancer   Per NCCN Breast Cancer Screening and Diagnosis Guidelines Version 1.2023   Recommended screening Test or procedure Last done Next Scheduled    Clinical encounter Clinical exam every 6-12 months.   Refer to genetic counseling if not already done.  Consider risk reduction strategies.   1/23/2024 January 2025   However, some family histories with breast cancers at a very young age, may warrant screening starting earlier.    *May begin at age 40 if breast cancers in the family occur at later ages.    Annual mammogram beginning 10 years younger than the earliest breast cancer in the family but not prior to age 30.    Recommend annual breast MRI to begin 10 years younger than the earliest breast cancer in the family but not prior to age 25.    Breast MRIs are preferably done on day 7-15 of the menstrual cycle in premenopausal women. Breast biopsy in December 2022- negative for atypia    Breast MRI 6/23/2023 Mammogram today    Breast MRI in June (6/24 or later)    Return to clinic in January 2025 with Mary Merritt   Breast screening for patients at high risk due to thoracic radiation between the ages of 10-30   Annual clinical exam beginning 8 years after radiation therapy.    Annual screening mammogram beginning at age 30 or 8 years after radiation therapy    Annual breast MRI, beginning at age 25 or 8 years after radiation therapy.     NA   NA   Women who have a lifetime risk of  >20% based on history of LCIS or ADH/ALH Annual screening mammogram beginning at age of LCIS or ADH/ALH but not prior to age 30.    Consider annual MRI to begin at age of diagnosis of LCIS or ADH/ALH but not prior to age 25.    Consider risk reducing strategies.   NA   NA    Recommend risk reducing strategies for women with 1.7% 5 year risk of breast cancer.         I spent a total of 32 minutes on the day of the visit. Please see the note for further information on patient assessment and treatment.     Mary Merritt, DNP, APRN, AGCNS-BC  Clinical Nurse Specialist  Cancer Risk Management Program  [facility]    The patient was seen in conjunction with me today.  I agree with the exam assessment and  am in agreement with the plan.    Portia Alvarez, MIKEL-CNS, OCN, AGN-BC  Clinical Nurse Specialist  Cancer Risk Management Program  [facility]      Cc: Kim Urbina, MIKEL, CNP

## 2024-02-12 ENCOUNTER — OFFICE VISIT (OUTPATIENT)
Dept: OPTOMETRY | Facility: CLINIC | Age: 44
End: 2024-02-12
Payer: COMMERCIAL

## 2024-02-12 DIAGNOSIS — H52.222 REGULAR ASTIGMATISM OF LEFT EYE: ICD-10-CM

## 2024-02-12 DIAGNOSIS — H52.4 PRESBYOPIA: ICD-10-CM

## 2024-02-12 DIAGNOSIS — Z01.00 ENCOUNTER FOR EXAMINATION OF EYES AND VISION WITHOUT ABNORMAL FINDINGS: Primary | ICD-10-CM

## 2024-02-12 DIAGNOSIS — H52.13 MYOPIA OF BOTH EYES: ICD-10-CM

## 2024-02-12 PROCEDURE — 92004 COMPRE OPH EXAM NEW PT 1/>: CPT | Performed by: OPTOMETRIST

## 2024-02-12 PROCEDURE — 92015 DETERMINE REFRACTIVE STATE: CPT | Performed by: OPTOMETRIST

## 2024-02-12 ASSESSMENT — CONF VISUAL FIELD
OS_NORMAL: 1
OD_SUPERIOR_NASAL_RESTRICTION: 0
OS_SUPERIOR_NASAL_RESTRICTION: 0
OD_INFERIOR_TEMPORAL_RESTRICTION: 0
OS_INFERIOR_TEMPORAL_RESTRICTION: 0
OD_SUPERIOR_TEMPORAL_RESTRICTION: 0
OS_SUPERIOR_TEMPORAL_RESTRICTION: 0
OD_INFERIOR_NASAL_RESTRICTION: 0
OD_NORMAL: 1
METHOD: COUNTING FINGERS
OS_INFERIOR_NASAL_RESTRICTION: 0

## 2024-02-12 ASSESSMENT — CUP TO DISC RATIO
OD_RATIO: 0.35
OS_RATIO: 0.35

## 2024-02-12 ASSESSMENT — REFRACTION_MANIFEST
OS_SPHERE: -1.00
OD_ADD: +1.25
OD_CYLINDER: SPHERE
OS_AXIS: 028
OD_SPHERE: -0.75
OS_CYLINDER: +0.75
OS_ADD: +1.25

## 2024-02-12 ASSESSMENT — VISUAL ACUITY
OD_SC: 20/25
OS_SC: 20/25
OD_SC+: -2
OS_SC: 20/20-2
OS_SC+: +2
METHOD: SNELLEN - LINEAR
OD_SC: 20/20

## 2024-02-12 ASSESSMENT — SLIT LAMP EXAM - LIDS
COMMENTS: NORMAL
COMMENTS: NORMAL

## 2024-02-12 ASSESSMENT — EXTERNAL EXAM - LEFT EYE: OS_EXAM: NORMAL

## 2024-02-12 ASSESSMENT — EXTERNAL EXAM - RIGHT EYE: OD_EXAM: NORMAL

## 2024-02-12 ASSESSMENT — TONOMETRY
IOP_METHOD: APPLANATION
OD_IOP_MMHG: 13
OS_IOP_MMHG: 16

## 2024-02-12 NOTE — PATIENT INSTRUCTIONS
Glasses prescriptions provided today.   I recommend filling the computer glasses prescription. This will help relax your eyes while on the computer and hopefully alleviate your eyestrain and headaches.     Return in 2 years for a comprehensive eye exam, or sooner if needed.      The effects of the dilating drops last for 4- 6 hours.  You will be more sensitive to light and vision will be blurry up close.  Mydriatic sunglasses were given if needed.     Alfonso Bradshaw, OD  [facility]  [address] [place] [address]. [place]  [place]  [phone]

## 2024-02-12 NOTE — PROGRESS NOTES
Chief Complaint   Patient presents with    Annual Eye Exam     -Amblyopia as a child - unsure which eye - no patching/surgery    -Family hx of glaucoma (father, Mat. GPA)     Last Eye Exam: 1/27/2021  Dilated Previously: Yes, side effects of dilation explained today    What are you currently using to see?  does not use glasses or contacts - does have a +0.50 OTC [place] she wears infrequently (did not bring with today)       Distance Vision Acuity: Satisfied with vision    Near Vision Acuity: Not satisfied - noticing increase in eye strain/headaches over there last 6 months or so - has been worsening over the last few weeks - almost constant now - seems secondary to computer use     Eye Comfort: Intermittent pressure on top of both eyes and some photophobia  - feels like symptom of the eye strain,  Do you use eye drops? : No  Occupation or Hobbies:  for [employer]     Meghan Stevenson  Optometry Assistant          Medical, surgical and family histories reviewed and updated 2/12/2024.       OBJECTIVE: See Ophthalmology exam    ASSESSMENT:    ICD-10-CM    1. Encounter for examination of eyes and vision without abnormal findings  Z01.00       2. Myopia of both eyes  H52.13       3. Regular astigmatism of left eye  H52.222       4. Presbyopia  H52.4           PLAN:     Patient Instructions   Glasses prescriptions provided today.   I recommend filling the computer glasses prescription. This will help relax your eyes while on the computer and hopefully alleviate your eyestrain and headaches.     Return in 2 years for a comprehensive eye exam, or sooner if needed.      The effects of the dilating drops last for 4- 6 hours.  You will be more sensitive to light and vision will be blurry up close.  Mydriatic sunglasses were given if needed.     Alfonso Bradshaw, OD  [facility]  [address] [place] [address]. [place]  [address]    [phone]

## 2024-02-12 NOTE — LETTER
2/12/2024         RE: Karolina Herrera  [address]  [facility] [address]        Dear Colleague,    Thank you for referring your patient, Karolina Herrera, to the [facility]. Please see a copy of my visit note below.    Chief Complaint   Patient presents with     Annual Eye Exam     -Amblyopia as a child - unsure which eye - no patching/surgery    -Family hx of glaucoma (father, Mat. GPA)     Last Eye Exam: 1/27/2021  Dilated Previously: Yes, side effects of dilation explained today    What are you currently using to see?  does not use glasses or contacts - does have a +0.50 OTC [place] she wears infrequently (did not bring with today)       Distance Vision Acuity: Satisfied with vision    Near Vision Acuity: Not satisfied - noticing increase in eye strain/headaches over there last 6 months or so - has been worsening over the last few weeks - almost constant now - seems secondary to computer use     Eye Comfort: Intermittent pressure on top of both eyes and some photophobia  - feels like symptom of the eye strain,  Do you use eye drops? : No  Occupation or Hobbies:  for [employer]     Meghan Stevenson  Optometry Assistant          Medical, surgical and family histories reviewed and updated 2/12/2024.       OBJECTIVE: See Ophthalmology exam    ASSESSMENT:    ICD-10-CM    1. Encounter for examination of eyes and vision without abnormal findings  Z01.00       2. Myopia of both eyes  H52.13       3. Regular astigmatism of left eye  H52.222       4. Presbyopia  H52.4           PLAN:     Patient Instructions   Glasses prescriptions provided today.   I recommend filling the computer glasses prescription. This will help relax your eyes while on the computer and hopefully alleviate your eyestrain and headaches.     Return in 2 years for a comprehensive eye exam, or sooner if needed.      The effects of the dilating drops last for 4- 6 hours.  You will be more sensitive to light and  vision will be blurry up close.  Mydriatic sunglasses were given if needed.     Alfonso Bradshaw, JENIFER  [address] [place]  [address]    [phone]       Again, thank you for allowing me to participate in the care of your patient.        Sincerely,        Alfonso Bradshaw OD

## 2024-02-22 PROBLEM — Z12.39 BREAST CANCER SCREENING, HIGH RISK PATIENT: Status: ACTIVE | Noted: 2024-02-22

## 2024-03-29 RX ORDER — BISACODYL 5 MG/1
TABLET, DELAYED RELEASE ORAL
Qty: 4 TABLET | Refills: 0 | Status: SHIPPED | OUTPATIENT
Start: 2024-03-29

## 2024-04-02 NOTE — TELEPHONE ENCOUNTER
Attempted to contact patient in order to complete pre assessment questions.     No answer. Left message to return call to [phone] option 4    Callback required communication sent via [employer].    Lubna Martin RN  Endoscopy Procedure Pre Assessment RN

## 2024-04-02 NOTE — TELEPHONE ENCOUNTER
Pre visit planning completed.      Procedure details:    Patient scheduled for Colonoscopy  on 4/17/2024.     Arrival time: 0700. Procedure time 0800    Facility location: [facility]; [address], [address], [place] [address]. Check in location: Main entrance at registration desk.    Sedation type: Conscious sedation     Pre op exam needed? N/A    Indication for procedure:   Blood in stool [K92.1]      Irritable bowel syndrome with both constipation and diarrhea [K58.2]            Chart review:     Electronic implanted devices? No    Recent diagnosis of diverticulitis within the last 6 weeks? No    Diabetic? No      Medication review:    Anticoagulants? No    NSAIDS? No NSAID medications per patient's medication list.  RN will verify with pre-assessment call.    Other medication HOLDING recommendations:  N/A      Prep for procedure:     Bowel prep recommendation: Extended Golytely. Bowel prep prescription sent to    [facility] [address] [place], [place] - [address] [place] [address]  Due to: BMI > 40.     Prep instructions sent via tarik Martin RN  Endoscopy Procedure Pre Assessment RN  [phone] option 4

## 2024-04-05 NOTE — TELEPHONE ENCOUNTER
Second call attempt to complete pre assessment.     No answer.  Left message to return call to [phone] #4 by next business day prior to 4PM or procedure will be sent to cancel.     Callback required communication sent via [employer].      Ira Hay RN  Endoscopy Procedure Pre Assessment RN

## 2024-04-09 NOTE — TELEPHONE ENCOUNTER
No return call received.   Pre assessment was not completed for upcoming scheduled procedure.     Staff message sent to endoscopy scheduling to cancel procedure per policy.       Lubna Martin RN   Endoscopy Procedure Pre Assessment RN

## 2024-04-09 NOTE — TELEPHONE ENCOUNTER
Caller: No call    Reason for Reschedule/Cancellation   (please be detailed, any staff messages or encounters to note?): Cancellation policy - pre-assessment call not completed.      Prior to reschedule please review:  Ordering Provider: Kim Urbina  Sedation Determined: Moderate  Does patient have any ASC Exclusions, please identify?: Y - GILDARDO      Notes on Cancelled Procedure:  Procedure: Lower Endoscopy [Colonoscopy]   Date: 4/17/2024  Location: [facility]; [address] [place] [address], [address]rd Floor, [place] [id number]   Surgeon: All      Rescheduled: No, sent MyChart and CTL.        Did you cancel or rescheduled an EUS procedure? No.     CASE IN DEPOT

## 2024-04-18 ENCOUNTER — THERAPY VISIT (OUTPATIENT)
Dept: PHYSICAL THERAPY | Facility: CLINIC | Age: 44
End: 2024-04-18
Payer: COMMERCIAL

## 2024-04-18 DIAGNOSIS — N39.3 SUI (STRESS URINARY INCONTINENCE, FEMALE): Primary | ICD-10-CM

## 2024-04-18 PROCEDURE — 97110 THERAPEUTIC EXERCISES: CPT | Mod: 59 | Performed by: PHYSICAL THERAPIST

## 2024-04-18 PROCEDURE — 97530 THERAPEUTIC ACTIVITIES: CPT | Mod: GP | Performed by: PHYSICAL THERAPIST

## 2024-04-18 NOTE — PROGRESS NOTES
04/18/24 0500   Appointment Info   Signing clinician's name / credentials Meg Askew DPT   Total/Authorized Visits 8 per PT   Visits Used 5   Medical Diagnosis stress urinary incontinence   PT Tx Diagnosis stress urinary incontinence   Progress Note/Certification   Onset of illness/injury or Date of Surgery 10/17/23   Therapy Frequency 2x a month   Predicted Duration 12 weeks   Progress Note Due Date 07/11/24   Progress Note Completed Date 04/18/24   PT Goal 1   Goal Description Pt will have no leaking of urine with cough/sneeze.   Rationale to maximize safety and independence with performance of ADLs and functional tasks   Goal Progress no progress pt sick   Target Date 02/21/24   Subjective Report   Subjective Report She has not been doing her exercises. She feels that she has made some improvement. She will get leaking of urine occasionally with coughing/sneezing about 1x a week. She is doing a lot of longer walks and biking without any leaking.   Objective Measures   Objective Measures Objective Measure 1;Objective Measure 2;Objective Measure 3   Objective Measure 1   Objective Measure glute med strength- not today   Details L: 5/5, R: 5/5 compenstation with hip flexors bilaterally   Objective Measure 3   Objective Measure internal vaginal PFM assessment   Details strength: 2/5, endurance: 10 seocnds, 10/10 quick flicks, ttp bulbocav B on right side, reduced after kegel, poor relaxation after quick flicks, greatest engagement of PFM with cue to pull marble up and in vaginally   Treatment Interventions (PT)   Interventions Therapeutic Procedure/Exercise;Therapeutic Activity   Therapeutic Procedure/Exercise   Therapeutic Procedures: strength, endurance, ROM, flexibility minutes (34260) 23   Therapeutic Procedures Ther Proc 2;Ther Proc 3;Ther Proc 4;Ther Proc 5;Ther Proc 6;Ther Proc 7   Ther Proc 1 plank on forearms on knees   Ther Proc 1 - Details cues for posterior pelvic tilt and tra 4x30 seconds    Ther Proc 2 squat holding on to 10# weight tapping bottom on chair   Ther Proc 2 - Details 2x15 reps cues for tra and to shift weight more into heels of feet   Ther Proc 3 kegel seated and supine   Ther Proc 3 - Details 10x holding for 5 seconds   Skilled Intervention cueing, see above   Patient Response/Progress demonstrated well   Therapeutic Activity   Therapeutic Activities: dynamic activities to improve functional performance minutes (64856) 13   Ther Act 1 Educated pt on purpose of PT HEP and POC. Educated pt on anatomy and funciton of PFM with model for reference. Increased time reassessing PFM strength and function   Ther Act 1 - Details Edcuated pt on pressure management and importance of strengthening core and PFM to reduce incontinence. Reviewed muscle hypertophy timeline- reinforced pt perform kegels of at least 60 reps a day.   Skilled Intervention education/instruciton   Patient Response/Progress verbal understanding   Education   Learner/Method Patient;No Barriers to Learning   Plan   Home program ptrx   Plan for next session assess   Total Session Time   Timed Code Treatment Minutes 36   Total Treatment Time (sum of timed and untimed services) 36         PLAN  Continue therapy per current plan of care.    Beginning/End Dates of Progress Note Reporting Period:  04/18/24 to 04/18/2024    Referring Provider:  Kim Urbina

## 2024-05-13 ENCOUNTER — THERAPY VISIT (OUTPATIENT)
Dept: PHYSICAL THERAPY | Facility: CLINIC | Age: 44
End: 2024-05-13
Payer: COMMERCIAL

## 2024-05-13 DIAGNOSIS — N39.3 SUI (STRESS URINARY INCONTINENCE, FEMALE): Primary | ICD-10-CM

## 2024-05-13 PROCEDURE — 97110 THERAPEUTIC EXERCISES: CPT | Mod: GP | Performed by: PHYSICAL THERAPIST

## 2024-05-13 PROCEDURE — 97530 THERAPEUTIC ACTIVITIES: CPT | Mod: GP | Performed by: PHYSICAL THERAPIST

## 2024-05-29 ENCOUNTER — THERAPY VISIT (OUTPATIENT)
Dept: PHYSICAL THERAPY | Facility: CLINIC | Age: 44
End: 2024-05-29
Payer: COMMERCIAL

## 2024-05-29 DIAGNOSIS — N39.3 SUI (STRESS URINARY INCONTINENCE, FEMALE): Primary | ICD-10-CM

## 2024-05-29 PROCEDURE — 97110 THERAPEUTIC EXERCISES: CPT | Mod: GP | Performed by: PHYSICAL THERAPIST

## 2024-06-05 DIAGNOSIS — F41.9 ANXIETY: ICD-10-CM

## 2024-06-05 RX ORDER — LORAZEPAM 1 MG/1
1 TABLET ORAL
Qty: 1 TABLET | Refills: 0 | Status: SHIPPED | OUTPATIENT
Start: 2024-06-05

## 2024-06-08 NOTE — PROGRESS NOTES
Subjective     Karolina Herrera is a 39 year old female who presents to clinic today for the following health issues:    HPI   respiratory      Duration: sept 1    Description (location/character/radiation): cough, wheezing and congestion but getting better in last 2 days, sore throat dosent seem to change    Intensity:  moderate    Accompanying signs and symptoms: none    History (similar episodes/previous evaluation): None    Precipitating or alleviating factors: pt on prednisone    Therapies tried and outcome: None     - Patient initially seen by this provider for URI sx >10 days on 9/13/2019. Dx acute bronchitis and prescribed albuterol inhaler use prn.  - Sx worsened and patient seen by Dr Lombardi on 9/17/2019. CXR negative, treated for bronchitis with prednisone burst and Zpak.  - Sx did improve, but then patient feels like she got sick again so sx returned.  - Acute illness has resolved but patient continues to have chronic cough for past 2 months.  - Seen again 11/15/2019 by Peyton Sampson. Unable to obtain CXR, but CBC wnl so treated as viral bronchitis. Given prednisone burst, which helped.    Patient Active Problem List   Diagnosis     Family history of malignant neoplasm of breast     PCOS (polycystic ovarian syndrome)     Irritable bowel syndrome without diarrhea     BMI 40.0-44.9, adult (H)     ACP (advance care planning)     History of gestational diabetes     Past Surgical History:   Procedure Laterality Date     GYN SURGERY  7/2014    Polypectomy     IVS  2015    invitro fertilization     LAPAROSCOPIC CHOLECYSTECTOMY N/A 11/7/2016    Procedure: LAPAROSCOPIC CHOLECYSTECTOMY;  Surgeon: Juan F Sherman MD;  Location: [facility]     NASAL/SINUS POLYPECTOMY       wisdom teeth removed         Social History     Tobacco Use     Smoking status: Never Smoker     Smokeless tobacco: Never Used   Substance Use Topics     Alcohol use: Yes     Comment: 1-2 per week.     Family History   Problem Relation Age of  Onset     Esophageal Cancer Maternal Grandfather          in 60s; hx of smoking     Bladder Cancer Paternal Grandfather          in 80s     Breast Cancer Sister 39        invasive ductal carcinoma, treated with lumpectomy, radiation and tamoxifen     Breast Cancer Paternal Aunt         paternal great aunt in 70s     Breast Cancer Sister         Age 39 at diagnosis         Current Outpatient Medications   Medication Sig Dispense Refill     albuterol (PROAIR HFA/PROVENTIL HFA/VENTOLIN HFA) 108 (90 Base) MCG/ACT inhaler Inhale 2 puffs into the lungs every 4 hours as needed for shortness of breath / dyspnea, wheezing or other (cough) 1 Inhaler 0     predniSONE (DELTASONE) 20 MG tablet Take 1 tablet (20 mg) by mouth daily 5 tablet 0     PRENATAL VIT W/ FE BISG-FA PO Reported on 2017         Reviewed and updated as needed this visit by Provider         Review of Systems   ROS COMP: Constitutional, HEENT, cardiovascular, pulmonary, GI, , musculoskeletal, neuro, skin, endocrine and psych systems are negative, except as otherwise noted.      Objective    /74 (Cuff Size: Adult Large)   Pulse 83   Temp 97.2  F (36.2  C) (Tympanic)   Wt 129.3 kg (285 lb)   SpO2 96%   BMI 44.64 kg/m    Body mass index is 44.64 kg/m .  Physical Exam   GENERAL: obese, no acute distress  PSYCH: pleasant, cooperative  EYES: no discharge, no injection  HENT:  Normocephalic. Moist mucus membranes.  NECK:  Supple, symmetric  LUNGS:  Clear to auscultation bilaterally without rhonchi, rales, or wheeze. Chest rise symmetric and no tenderness to palpation.  HEART:  Regular rate & rhythm. No murmur, gallop, or rub.  EXTREMITIES:  No gross deformities, moves all 4 limbs spontaneously, no peripheral edema  SKIN:  Warm and dry, no rash or suspicious lesions    NEUROLOGIC: alert, sensation grossly intact.    Diagnostic Test Results:  none         Assessment & Plan       ICD-10-CM    1. Chronic cough R05 PULMONARY MEDICINE REFERRAL      - Reassured patient of benign lung exam. Lung sounds somewhat diminished throughout but may be due to body habitus vs reactive airway. Given improvement with prednisone, reactive airway remains on differential.  - Discussed multiple options. Patient declines trial maintenance inhaler but would like referral to pulmonology, placed. Will also trial daily OTC antihistamine for 2 weeks to see if this helps.  - Reviewed red flags sx that warrant prompt recheck.    No follow-ups on file.    Nancy Anne PA-C  [facility]     Yes

## 2024-07-01 ENCOUNTER — THERAPY VISIT (OUTPATIENT)
Dept: PHYSICAL THERAPY | Facility: CLINIC | Age: 44
End: 2024-07-01
Payer: COMMERCIAL

## 2024-07-01 DIAGNOSIS — N39.3 SUI (STRESS URINARY INCONTINENCE, FEMALE): Primary | ICD-10-CM

## 2024-07-01 PROCEDURE — 97110 THERAPEUTIC EXERCISES: CPT | Mod: GP | Performed by: PHYSICAL THERAPIST

## 2024-07-01 PROCEDURE — 97530 THERAPEUTIC ACTIVITIES: CPT | Mod: GP | Performed by: PHYSICAL THERAPIST

## 2024-08-22 ENCOUNTER — ANCILLARY PROCEDURE (OUTPATIENT)
Dept: MRI IMAGING | Facility: CLINIC | Age: 44
End: 2024-08-22
Payer: COMMERCIAL

## 2024-08-22 DIAGNOSIS — Z12.39 BREAST CANCER SCREENING, HIGH RISK PATIENT: ICD-10-CM

## 2024-08-22 DIAGNOSIS — Z80.3 FAMILY HISTORY OF MALIGNANT NEOPLASM OF BREAST: ICD-10-CM

## 2024-08-22 PROCEDURE — 77049 MRI BREAST C-+ W/CAD BI: CPT

## 2024-08-22 PROCEDURE — A9585 GADOBUTROL INJECTION: HCPCS | Mod: JW

## 2024-08-22 RX ORDER — GADOBUTROL 604.72 MG/ML
15 INJECTION INTRAVENOUS ONCE
Status: COMPLETED | OUTPATIENT
Start: 2024-08-22 | End: 2024-08-22

## 2024-08-22 RX ADMIN — GADOBUTROL 14 ML: 604.72 INJECTION INTRAVENOUS at 11:35

## 2024-09-19 ENCOUNTER — THERAPY VISIT (OUTPATIENT)
Dept: PHYSICAL THERAPY | Facility: CLINIC | Age: 44
End: 2024-09-19
Payer: COMMERCIAL

## 2024-09-19 ENCOUNTER — TELEPHONE (OUTPATIENT)
Dept: GASTROENTEROLOGY | Facility: CLINIC | Age: 44
End: 2024-09-19

## 2024-09-19 DIAGNOSIS — N39.3 SUI (STRESS URINARY INCONTINENCE, FEMALE): Primary | ICD-10-CM

## 2024-09-19 PROCEDURE — 97110 THERAPEUTIC EXERCISES: CPT | Mod: GP | Performed by: PHYSICAL THERAPIST

## 2024-09-19 PROCEDURE — 97530 THERAPEUTIC ACTIVITIES: CPT | Mod: GP | Performed by: PHYSICAL THERAPIST

## 2024-09-19 NOTE — TELEPHONE ENCOUNTER
"Endoscopy Scheduling Screen    Have you had any respiratory illness or flu-like symptoms in the last 10 days?  Yes (Schedule at least 10 days from symptom onset)      What is your communication preference for Instructions and/or Bowel Prep?   MyChart      What insurance is in the chart?  Other:  [employer]    Ordering/Referring Provider: COLTON DUMAS   (If ordering provider performs procedure, schedule with ordering provider unless otherwise instructed. )    BMI: Estimated body mass index is 48.87 kg/m  as calculated from the following:    Height as of 10/17/23: 1.705 m (5' 7.13\").    Weight as of 1/23/24: 142.1 kg (313 lb 3.2 oz).       Sedation Ordered  moderate sedation.   If patient BMI > 50 do not schedule in [facility].    If patient BMI > 45 do not schedule at [facility].    Are you taking methadone or Suboxone?  NO, No RN review required.    Have you been diagnosed and are being treated for severe PTSD or severe anxiety?  NO, No RN review required.    Are you taking any prescription medications for pain 3 or more times per week?   NO, No RN review required.      Do you have a history of malignant hyperthermia?  No      (Females) Are you currently pregnant?   No       Have you been diagnosed or told you have pulmonary hypertension?   No      Do you have an LVAD?  No      Have you been told you have moderate to severe sleep apnea?  Yes. Do you use a CPAP? Yes. (RN Review required for scheduling unless scheduling in Hospital.)   Moderate or mild per pt    Have you been told you have COPD, asthma, or any other lung disease?  No      Do you have any heart conditions?  No       Have you ever had or are you waiting for an organ transplant?  No. Continue scheduling, no site restrictions.      Have you had a stroke or transient ischemic attack (TIA aka \"mini stroke\" in the last 6 months?   No      Have you been diagnosed with or been told you have cirrhosis of the liver?   No.      Are you currently on dialysis?   No      Do you " "need assistance transferring?   No    BMI: Estimated body mass index is 48.87 kg/m  as calculated from the following:    Height as of 10/17/23: 1.705 m (5' 7.13\").    Weight as of 1/23/24: 142.1 kg (313 lb 3.2 oz).     Is patients BMI > 40 and scheduling location [facility]?  No      Do you take an injectable or oral medication for weight loss or diabetes (excluding insulin)?  No      Do you take the medication Naltrexone?  No      Do you take blood thinners?  No       Prep   Are you currently on dialysis or do you have chronic kidney disease?  No      Do you have a diagnosis of diabetes?  No      Do you have a diagnosis of cystic fibrosis (CF)?  No    On a regular basis do you go 3 -5 days between bowel movements?  No      BMI > 40?  Yes (Extended Prep)    Preferred [facility]acy:    [id number] [place], [place] [facility] [address] [facility]  [address] [facility] [address]  Phone: [phone] Fax: [phone]        Final Scheduling Details     Procedure scheduled  Colonoscopy    Surgeon:  FAITH     Date of procedure:  10/29/24    Pre-OP / PAC:   No - Not required for this site.    Location  [facility] - Per RN assessment. HOSPITAL    Sedation   Moderate Sedation - Per order.      Patient Reminders:   You will receive a call from a Nurse to review instructions and health history.  This assessment must be completed prior to your procedure.  Failure to complete the Nurse assessment may result in the procedure being cancelled.      On the day of your procedure, please designate an adult(s) who can drive you home stay with you for the next 24 hours. The medicines used in the exam will make you sleepy. You will not be able to drive.      You cannot take public transportation, ride share services, or non-medical taxi service without a responsible caregiver.  Medical transport services are allowed with the requirement that a responsible caregiver will receive you at your destination.  We require that drivers and caregivers are " confirmed prior to your procedure.

## 2024-09-19 NOTE — TELEPHONE ENCOUNTER
"Pre Assessment RN Review    Focused Assessments      GILDARDO Hx  Estimated body mass index is 48.87 kg/m  as calculated from the following:    Height as of 10/17/23: 1.705 m (5' 7.13\").    Weight as of 1/23/24: 142.1 kg (313 lb 3.2 oz).     Patient has reported / documented history GILDARDO and reports she does use a a device for sleep.     Device: CPAP    Severity Assessment    Sleep Study:   Diagnosis/Severity: Severe    AHI: 51.9          Scheduling Status & Recommendations    Location Type: Hospital - Per exclusion criteria.    Nadira Cook RN Colorectal Cancer   Division of Gastroenterology at [facility]      "

## 2024-09-27 ENCOUNTER — THERAPY VISIT (OUTPATIENT)
Dept: PHYSICAL THERAPY | Facility: CLINIC | Age: 44
End: 2024-09-27
Payer: COMMERCIAL

## 2024-09-27 DIAGNOSIS — N39.3 SUI (STRESS URINARY INCONTINENCE, FEMALE): Primary | ICD-10-CM

## 2024-09-27 PROCEDURE — 97140 MANUAL THERAPY 1/> REGIONS: CPT | Mod: GP | Performed by: PHYSICAL THERAPIST

## 2024-09-27 PROCEDURE — 97110 THERAPEUTIC EXERCISES: CPT | Mod: GP | Performed by: PHYSICAL THERAPIST

## 2024-09-27 PROCEDURE — 97530 THERAPEUTIC ACTIVITIES: CPT | Mod: GP | Performed by: PHYSICAL THERAPIST

## 2024-10-08 NOTE — TELEPHONE ENCOUNTER
Extended Golytely Bowel Prep  recommended due to BMI > 40.  Instructions were sent via [employer]. Bowel prep was sent 10/8/2024 to    [place] [address] [place] [address]

## 2024-10-17 RX ORDER — BUPROPION HYDROCHLORIDE 300 MG/1
300 TABLET ORAL EVERY MORNING
COMMUNITY

## 2024-10-17 RX ORDER — LORATADINE 10 MG/1
10 TABLET ORAL DAILY
COMMUNITY

## 2024-10-22 ENCOUNTER — TELEPHONE (OUTPATIENT)
Dept: GASTROENTEROLOGY | Facility: CLINIC | Age: 44
End: 2024-10-22

## 2024-10-22 ENCOUNTER — OFFICE VISIT (OUTPATIENT)
Dept: FAMILY MEDICINE | Facility: CLINIC | Age: 44
End: 2024-10-22
Payer: COMMERCIAL

## 2024-10-22 ENCOUNTER — ANCILLARY PROCEDURE (OUTPATIENT)
Dept: GENERAL RADIOLOGY | Facility: CLINIC | Age: 44
End: 2024-10-22
Payer: COMMERCIAL

## 2024-10-22 VITALS
WEIGHT: 293 LBS | SYSTOLIC BLOOD PRESSURE: 107 MMHG | RESPIRATION RATE: 17 BRPM | BODY MASS INDEX: 45.99 KG/M2 | HEART RATE: 75 BPM | HEIGHT: 67 IN | TEMPERATURE: 98.1 F | OXYGEN SATURATION: 96 % | DIASTOLIC BLOOD PRESSURE: 69 MMHG

## 2024-10-22 DIAGNOSIS — J18.9 PNEUMONIA OF RIGHT LOWER LOBE DUE TO INFECTIOUS ORGANISM: Primary | ICD-10-CM

## 2024-10-22 DIAGNOSIS — J18.9 PNEUMONIA OF RIGHT LOWER LOBE DUE TO INFECTIOUS ORGANISM: ICD-10-CM

## 2024-10-22 LAB
ANION GAP SERPL CALCULATED.3IONS-SCNC: 10 MMOL/L (ref 7–15)
BASOPHILS # BLD AUTO: 0 10E3/UL (ref 0–0.2)
BASOPHILS NFR BLD AUTO: 0 %
BUN SERPL-MCNC: 12.7 MG/DL (ref 6–20)
CALCIUM SERPL-MCNC: 9.2 MG/DL (ref 8.8–10.4)
CHLORIDE SERPL-SCNC: 100 MMOL/L (ref 98–107)
CREAT SERPL-MCNC: 0.83 MG/DL (ref 0.51–0.95)
EGFRCR SERPLBLD CKD-EPI 2021: 89 ML/MIN/1.73M2
EOSINOPHIL # BLD AUTO: 0.2 10E3/UL (ref 0–0.7)
EOSINOPHIL NFR BLD AUTO: 2 %
ERYTHROCYTE [DISTWIDTH] IN BLOOD BY AUTOMATED COUNT: 13.7 % (ref 10–15)
GLUCOSE SERPL-MCNC: 89 MG/DL (ref 70–99)
HCO3 SERPL-SCNC: 27 MMOL/L (ref 22–29)
HCT VFR BLD AUTO: 42.5 % (ref 35–47)
HGB BLD-MCNC: 13.5 G/DL (ref 11.7–15.7)
IMM GRANULOCYTES # BLD: 0.1 10E3/UL
IMM GRANULOCYTES NFR BLD: 1 %
LYMPHOCYTES # BLD AUTO: 3.9 10E3/UL (ref 0.8–5.3)
LYMPHOCYTES NFR BLD AUTO: 31 %
MCH RBC QN AUTO: 28.2 PG (ref 26.5–33)
MCHC RBC AUTO-ENTMCNC: 31.8 G/DL (ref 31.5–36.5)
MCV RBC AUTO: 89 FL (ref 78–100)
MONOCYTES # BLD AUTO: 0.7 10E3/UL (ref 0–1.3)
MONOCYTES NFR BLD AUTO: 6 %
NEUTROPHILS # BLD AUTO: 7.5 10E3/UL (ref 1.6–8.3)
NEUTROPHILS NFR BLD AUTO: 60 %
PLATELET # BLD AUTO: 260 10E3/UL (ref 150–450)
POTASSIUM SERPL-SCNC: 4.9 MMOL/L (ref 3.4–5.3)
RBC # BLD AUTO: 4.79 10E6/UL (ref 3.8–5.2)
SODIUM SERPL-SCNC: 137 MMOL/L (ref 135–145)
WBC # BLD AUTO: 12.5 10E3/UL (ref 4–11)

## 2024-10-22 PROCEDURE — 36415 COLL VENOUS BLD VENIPUNCTURE: CPT

## 2024-10-22 PROCEDURE — 99214 OFFICE O/P EST MOD 30 MIN: CPT

## 2024-10-22 PROCEDURE — 85025 COMPLETE CBC W/AUTO DIFF WBC: CPT

## 2024-10-22 PROCEDURE — 80048 BASIC METABOLIC PNL TOTAL CA: CPT

## 2024-10-22 PROCEDURE — 71046 X-RAY EXAM CHEST 2 VIEWS: CPT | Mod: TC | Performed by: RADIOLOGY

## 2024-10-22 RX ORDER — BENZONATATE 100 MG/1
100 CAPSULE ORAL 3 TIMES DAILY PRN
Qty: 30 CAPSULE | Refills: 0 | Status: SHIPPED | OUTPATIENT
Start: 2024-10-22 | End: 2024-11-01

## 2024-10-22 RX ORDER — DOXYCYCLINE 100 MG/1
CAPSULE ORAL
COMMUNITY
Start: 2024-10-16 | End: 2024-10-22

## 2024-10-22 RX ORDER — PREDNISONE 20 MG/1
2 TABLET ORAL
COMMUNITY
Start: 2024-10-16 | End: 2024-10-22

## 2024-10-22 RX ORDER — BUDESONIDE AND FORMOTEROL FUMARATE DIHYDRATE 160; 4.5 UG/1; UG/1
2 AEROSOL RESPIRATORY (INHALATION) 2 TIMES DAILY
Qty: 10.2 G | Refills: 0 | Status: SHIPPED | OUTPATIENT
Start: 2024-10-22

## 2024-10-22 RX ORDER — ALBUTEROL SULFATE 90 UG/1
2 INHALANT RESPIRATORY (INHALATION)
COMMUNITY
Start: 2024-10-16

## 2024-10-22 RX ORDER — DOXYCYCLINE 100 MG/1
100 CAPSULE ORAL 2 TIMES DAILY
Qty: 10 CAPSULE | Refills: 0 | Status: SHIPPED | OUTPATIENT
Start: 2024-10-22 | End: 2024-10-27

## 2024-10-22 ASSESSMENT — PAIN SCALES - GENERAL: PAINLEVEL: MILD PAIN (3)

## 2024-10-22 NOTE — PROGRESS NOTES
Assessment & Plan     (J18.9) Pneumonia of right lower lobe due to infectious organism  (primary encounter diagnosis)  Comment: Acute without improvement.  Vital signs stable, no signs of respiratory distress, no findings that would warrant the need for immediate medical attention.  Considering presence of ongoing fine crackles in the lower lobes, and wheezing in the upper lobes, in addition to lack of generalized and systemic symptom improvement aside from fever resolution, I do have significant concerns for ongoing pneumonia.  Wheezing is fairly diffuse, and would like to avoid ongoing steroid bursting by adding an inhaled corticosteroid to patient's inhaler.  Will replace albuterol inhaler with Symbicort, and add on Tessalon Perles specifically for nighttime use.  Repeat chest x-ray, CBC, and BMP.  This will mainly be due to determine diagnosis of ongoing concerns for pneumonia, and to ensure that patient continues to be stable from an outpatient standpoint.  Patient is not experiencing confusion or mental status changes, her respiratory rate is 17, and blood pressure remained stable, so it is very unlikely that her curb 65 would qualify her for inpatient treatment and monitoring.  Of course if we cannot get better control of her respiratory concerns from an outpatient setting, or if symptoms acutely change or worsen, encouraging her to go back to the ED. Offered education on medications including appropriate dosing, possible side effects, and possible adverse effects.  Education given on return to clinic instructions as well as alarm signs that would require the need for immediate medical attention.  Patient attested to understanding.  Plan: benzonatate (TESSALON) 100 MG capsule,         budesonide-formoterol (SYMBICORT) 160-4.5         MCG/ACT Inhaler, XR Chest 2 Views, CBC with         platelets and differential, Basic metabolic         panel  (Ca, Cl, CO2, Creat, Gluc, K, Na, BUN)      This progress note  has been dictated, with use of voice recognition software. Any grammatical, typographical, or context errors are unintentional and inherent to use of voice recognition software.     Ordering of each unique test  Prescription drug management  I spent a total of 18 minutes on the day of the visit.   Time spent by me doing chart review, history and exam, documentation and further activities per the note    MED REC REQUIRED  Post Medication Reconciliation Status:         FUTURE APPOINTMENTS:       - Follow-up visit in 5 days if symptoms are not improving       - Follow-up for annual visit or as needed  Patient Instructions   We will repeat your chest xray and lab work today    I have prescribed a symbicort inhaler to help with the cough/wheezing. This combines a medication similar to albuterol with an inhaled steroid.  I would like you to begin a using 2 puffs of this medication anytime you are experiencing coughing, wheezing, or shortness of breath.  Give this medication about 10 minutes to work, and you may repeat another 2 puffs after that time if symptoms are still present.  If symptoms persist after 4 puffs, or if you are needing this medication more than 4 times a day, please follow-up right away    I have also prescribed a medication called Tessalon Perels. This is a cough medication that can be useful to prevent coughing jags overnight, and help you get a good nights rest. This medication can be used during the day, but it can often make people a bit drowsy, so I would encourage you to take it in the evening for the first time to determine how your body reacts to the medication.    We will let the chest xray and labs determine next steps in terms of length of antibiotic course and then antibiotic of choice    -Pain Medication: take tylenol and/or ibuprofen per the label instructions to manage pain and discomfort    -Rest: get adequate rest including 7-8 hours of sleep, and low activity levels during the day to  encourage healing. Avoid high impact exercise and rigorous physical labor    -Nutrition: support healing by fueling your body with healthy foods. Fruits, vegetables, and whole foods are all great options!    -Hydration: increase fluid intake. Illness leads to increased dehydration, so your body needs more fluid intake than it might when you are healthy. Greer and sugar free teas are great options. Try to avoid beverages that cause more dehydration including coffee, soda, energy drinks, and alcohol.     If your symptoms have not improved after 7-10 days, please reach out so that we can make a plan to help you feel better!    Please monitor symptoms carefully.  If developing chest pain, shortness of breath, fever, coughing up blood, extreme fatigue, or any other new, concerning symptoms, come back to clinic or go to ER immediately.  Otherwise, if no improvement in symptoms in one week, follow-up with your primary care provider.        Julia Turcios is a 44 year old, presenting for the following health issues:  Urgent Care Follow Up (10/16/2024/[facility]/Reason: Pneumonia of right lower lobe due to infectious organism (Primary Dx); /Acute bronchospasm /RX given: albuterol, prednisone, doxycyline /Imaging: Xray of chest//) and Follow Up (Pt reports: dx pneumonia, not improving- coughing, congestion, sob, fatigue, dizziness. Pt has finished all medications and reports still using inhaler with spacer)        10/22/2024    12:47 PM   Additional Questions   Roomed by Stacia MARQUES Followup:    Facility:  [facility]  Date of visit: 10/16/24   Reason for visit: Pneumonia of right lower lobe due to infectious organism (Primary Dx); Acute bronchospasm   Current Status: follow up with angelica    Karolina is a 44-year-old female with a past medical history significant for IBS, vitamin D deficiency, PCOS, pelvic floor weakness, and stress incontinence who presents today to follow-up  "after being seen in the emergency department on 16 October and diagnosed with right lower lobe pneumonia.  Patient notes that symptoms began on October 10 at which time she was experiencing a significant cough, and upper respiratory illness symptoms.  When the symptoms only worsened over time and began to incorporate high-grade fever, she elected to be seen in the emergency department.  Lab work up and chest x-ray were diagnostic of right lower lobe pneumonia, and patient was treated with a steroid burst, doxycycline twice daily for 5 days, and albuterol.  Patient follows up in the clinic today with ongoing symptoms, and concern for lack of symptom resolution.  She notes that the treatment course helps to manage her fever, which has resolved after about 48 hours of the antibiotics, but the remainder of her symptoms have persisted and shown little to no improvement.  She continues to have wheezing, shortness of breath, persistent coughing, difficulty sleeping due to coughing and chest congestion, difficulty getting a deep breath, fatigue, and headaches.  She continues to use the albuterol every 4 hours, but does not feel that largely helps.  She is currently sleeping sitting up.  She declines any abdominal pain, nausea, vomiting, or diarrhea.  She has of course finished both the course of steroids and the course of antibiotics without any missed days or doses.  Her daughter was also diagnosed with pneumonia about 3 weeks ago, but only remains to have a lingering cough.  The remainder of her illness symptoms have since resolved.  Patient declines any possibility for pregnancy.    Review of Systems  Constitutional, HEENT, cardiovascular, pulmonary, gi and gu systems are negative, except as otherwise noted.      Objective    /69 (BP Location: Left arm, Patient Position: Sitting, Cuff Size: Adult Regular)   Pulse 75   Temp 98.1  F (36.7  C) (Oral)   Resp 17   Ht 1.705 m (5' 7.13\")   Wt 141.9 kg (312 lb 12.8 " oz)   LMP 10/01/2024 (Approximate)   SpO2 96%   BMI 48.81 kg/m    Body mass index is 48.81 kg/m .  Physical Exam   GENERAL: alert, no distress, pale, and fatigued  EYES: Eyes grossly normal to inspection, PERRL and conjunctivae and sclerae normal  HENT: ear canals and TM's normal, nose and mouth without ulcers or lesions  NECK: no adenopathy, no asymmetry, masses, or scars  RESP: Mildly increased rate of breathing with easy depth.  No use of accessory muscles or cyanosis.  Mild diffuse expiratory wheezing in bilateral upper lobes with diffuse fine crackles in right lower lobe.  Hacking cough  ABDOMEN: soft, nontender, no hepatosplenomegaly, no masses and bowel sounds normal  MS: no gross musculoskeletal defects noted, no edema    Results for orders placed or performed in visit on 10/22/24 (from the past 24 hour(s))   CBC with platelets and differential    Narrative    The following orders were created for panel order CBC with platelets and differential.  Procedure                               Abnormality         Status                     ---------                               -----------         ------                     CBC with platelets and d...[[id number]]  Abnormal            Final result                 Please view results for these tests on the individual orders.   CBC with platelets and differential   Result Value Ref Range    WBC Count 12.5 (H) 4.0 - 11.0 10e3/uL    RBC Count 4.79 3.80 - 5.20 10e6/uL    Hemoglobin 13.5 11.7 - 15.7 g/dL    Hematocrit 42.5 35.0 - 47.0 %    MCV 89 78 - 100 fL    MCH 28.2 26.5 - 33.0 pg    MCHC 31.8 31.5 - 36.5 g/dL    RDW 13.7 10.0 - 15.0 %    Platelet Count 260 150 - 450 10e3/uL    % Neutrophils 60 %    % Lymphocytes 31 %    % Monocytes 6 %    % Eosinophils 2 %    % Basophils 0 %    % Immature Granulocytes 1 %    Absolute Neutrophils 7.5 1.6 - 8.3 10e3/uL    Absolute Lymphocytes 3.9 0.8 - 5.3 10e3/uL    Absolute Monocytes 0.7 0.0 - 1.3 10e3/uL    Absolute Eosinophils  0.2 0.0 - 0.7 10e3/uL    Absolute Basophils 0.0 0.0 - 0.2 10e3/uL    Absolute Immature Granulocytes 0.1 <=0.4 10e3/uL       Christiana Cedillo DNP FNP-C  Family Nurse Practitioner - Same Day Provider  [facility]    Signed Electronically by: MIKEL Coronel CNP

## 2024-10-22 NOTE — TELEPHONE ENCOUNTER
Caller: Karolina    Reason for Reschedule/Cancellation   (please be detailed, any staff messages or encounters to note?): patient has pneumonia      Prior to reschedule please review:  Ordering Provider: COLTON DUMAS   Sedation Determined: CS  Does patient have any ASC Exclusions, please identify?: y-GILDARDO      Notes on Cancelled Procedure:  Procedure: Lower Endoscopy [Colonoscopy]   Date: 10/29  Location: [facility]; [address] [place] [address]  Surgeon: Mary Ellen      Rescheduled: Yes,   Procedure: Lower Endoscopy [Colonoscopy]    Date: 11/20   Location: [facility]; [address] [place] [address]   Surgeon: Efrain   Sedation Level Scheduled  CS ,  Reason for Sedation Level order   Instructions updated and sent: y     Does patient need PAC or Pre -Op Rescheduled? : n       Did you cancel or rescheduled an EUS procedure? No.

## 2024-10-22 NOTE — PATIENT INSTRUCTIONS
We will repeat your chest xray and lab work today    I have prescribed a symbicort inhaler to help with the cough/wheezing. This combines a medication similar to albuterol with an inhaled steroid.  I would like you to begin a using 2 puffs of this medication anytime you are experiencing coughing, wheezing, or shortness of breath.  Give this medication about 10 minutes to work, and you may repeat another 2 puffs after that time if symptoms are still present.  If symptoms persist after 4 puffs, or if you are needing this medication more than 4 times a day, please follow-up right away    I have also prescribed a medication called Tessalon Perels. This is a cough medication that can be useful to prevent coughing jags overnight, and help you get a good nights rest. This medication can be used during the day, but it can often make people a bit drowsy, so I would encourage you to take it in the evening for the first time to determine how your body reacts to the medication.    We will let the chest xray and labs determine next steps in terms of length of antibiotic course and then antibiotic of choice    -Pain Medication: take tylenol and/or ibuprofen per the label instructions to manage pain and discomfort    -Rest: get adequate rest including 7-8 hours of sleep, and low activity levels during the day to encourage healing. Avoid high impact exercise and rigorous physical labor    -Nutrition: support healing by fueling your body with healthy foods. Fruits, vegetables, and whole foods are all great options!    -Hydration: increase fluid intake. Illness leads to increased dehydration, so your body needs more fluid intake than it might when you are healthy. Greer and sugar free teas are great options. Try to avoid beverages that cause more dehydration including coffee, soda, energy drinks, and alcohol.     If your symptoms have not improved after 7-10 days, please reach out so that we can make a plan to help you feel  better!    Please monitor symptoms carefully.  If developing chest pain, shortness of breath, fever, coughing up blood, extreme fatigue, or any other new, concerning symptoms, come back to clinic or go to ER immediately.  Otherwise, if no improvement in symptoms in one week, follow-up with your primary care provider.

## 2024-10-23 ENCOUNTER — TELEPHONE (OUTPATIENT)
Dept: FAMILY MEDICINE | Facility: CLINIC | Age: 44
End: 2024-10-23
Payer: COMMERCIAL

## 2024-10-23 DIAGNOSIS — J18.9 PNEUMONIA OF RIGHT LOWER LOBE DUE TO INFECTIOUS ORGANISM: Primary | ICD-10-CM

## 2024-10-23 RX ORDER — FLUTICASONE PROPIONATE AND SALMETEROL 250; 50 UG/1; UG/1
1 POWDER RESPIRATORY (INHALATION) EVERY 12 HOURS
Qty: 14 EACH | Refills: 0 | Status: SHIPPED | OUTPATIENT
Start: 2024-10-23

## 2024-10-23 NOTE — TELEPHONE ENCOUNTER
budesonide-formoterol (SYMBICORT) 160-4.5 MCG/ACT Inhaler 10.2 g 0 10/22/2024 -- No  Sig - Route: Inhale 2 puffs into the lungs 2 times daily. - Inhalation  Sent to pharmacy as: Budesonide-Formoterol Fumarate 160-4.5 MCG/ACT Inhalation Aerosol (SYMBICORT)  Class: E-Prescribe  Order: [id number]  E-Prescribing Status: Receipt confirmed by pharmacy (10/22/2024  1:13 PM CDT)    Printout Tracking    External Result Report    Pharmacy    [facility] [id number] [place], [place] - [address] [place] [address]    Associated Diagnoses    Pneumonia of right lower lobe due to infectious organism [J18.9]  - Primary      Source Order Set    Order Set Name Order ID   [id number]      Prescribing Provider's NPI: [id number]  Christiana Cedillo

## 2024-10-25 NOTE — TELEPHONE ENCOUNTER
PA Initiation    Medication: BUDESONIDE-FORMOTEROL FUMARATE 160-4.5 MCG/ACT IN AERO  Insurance Company: [employer] - Phone [phone] Fax [phone]  Ph[id number] [place] [facility] [place], [place] - [address] [facility]  Filling Pharmacy Phone: [phone]  Filling Pharmacy Fax:    Start Date: 10/25/2024    PA RESUBMITTED 10/31

## 2024-10-28 ENCOUNTER — OFFICE VISIT (OUTPATIENT)
Dept: FAMILY MEDICINE | Facility: CLINIC | Age: 44
End: 2024-10-28
Payer: COMMERCIAL

## 2024-10-28 VITALS
OXYGEN SATURATION: 98 % | TEMPERATURE: 97 F | HEART RATE: 92 BPM | DIASTOLIC BLOOD PRESSURE: 74 MMHG | RESPIRATION RATE: 20 BRPM | BODY MASS INDEX: 45.99 KG/M2 | HEIGHT: 67 IN | WEIGHT: 293 LBS | SYSTOLIC BLOOD PRESSURE: 122 MMHG

## 2024-10-28 DIAGNOSIS — J18.9 PNEUMONIA DUE TO INFECTIOUS ORGANISM, UNSPECIFIED LATERALITY, UNSPECIFIED PART OF LUNG: Primary | ICD-10-CM

## 2024-10-28 PROCEDURE — 99213 OFFICE O/P EST LOW 20 MIN: CPT | Performed by: NURSE PRACTITIONER

## 2024-10-28 RX ORDER — FLUCONAZOLE 150 MG/1
150 TABLET ORAL ONCE
Qty: 1 TABLET | Refills: 0 | Status: SHIPPED | OUTPATIENT
Start: 2024-10-28 | End: 2024-10-28

## 2024-10-28 ASSESSMENT — PAIN SCALES - GENERAL: PAINLEVEL_OUTOF10: NO PAIN (0)

## 2024-10-28 NOTE — PATIENT INSTRUCTIONS
-Continue to use albuterol, especially before activity and if you feel tight or wheezy.  Use symbicort twice daily for the next few weeks to calm down inflammation.  Slowly increase activity as tolerated.  Keep up with lots of fluids, warm baths, and steam.

## 2024-10-28 NOTE — PROGRESS NOTES
"  ICD-10-CM    1. Pneumonia due to infectious organism, unspecified laterality, unspecified part of lung  J18.9 fluconazole (DIFLUCAN) 150 MG tablet        Resolving; given fluconazole for prophylaxis in case she develops yeast infection from antibiotics.    Julia Turcios is a 44 year old, presenting for the following health issues:  Hospital F/U      10/28/2024     2:19 PM   Additional Questions   Roomed by Herson   Accompanied by Self     HPI       ED/UC Followup:    Facility:  [facility]  Date of visit: 10/16/2024  Reason for visit: Pneumonia of right lower lobe due to infectious organism  Current Status: feel better but not 100%      Review of Systems  Constitutional, neuro, ENT, endocrine, pulmonary, cardiac, gastrointestinal, genitourinary, musculoskeletal, integument and psychiatric systems are negative, except as otherwise noted.      Objective    /74   Pulse 92   Temp 97  F (36.1  C) (Temporal)   Resp 20   Ht 1.702 m (5' 7\")   Wt 141.1 kg (311 lb)   LMP 10/24/2024 (Exact Date)   SpO2 98%   BMI 48.71 kg/m    Body mass index is 48.71 kg/m .  Physical Exam   GENERAL: alert and no distress  EYES: Eyes grossly normal to inspection, PERRL and conjunctivae and sclerae normal  HENT: ear canals and TM's normal, nose and mouth without ulcers or lesions  NECK: no adenopathy, no asymmetry, masses, or scars  RESP: lungs clear to auscultation - no rales, rhonchi or wheezes  CV: regular rate and rhythm, normal S1 S2, no S3 or S4, no murmur, click or rub, no peripheral edema  MS: no gross musculoskeletal defects noted, no edema  PSYCH: mentation appears normal, affect normal/bright    Office Visit on 10/22/2024   Component Date Value Ref Range Status    Sodium 10/22/2024 137  135 - 145 mmol/L Final    Potassium 10/22/2024 4.9  3.4 - 5.3 mmol/L Final    Chloride 10/22/2024 100  98 - 107 mmol/L Final    Carbon Dioxide (CO2) 10/22/2024 27  22 - 29 mmol/L Final    Anion Gap 10/22/2024 10  7 - " 15 mmol/L Final    Urea Nitrogen 10/22/2024 12.7  6.0 - 20.0 mg/dL Final    Creatinine 10/22/2024 0.83  0.51 - 0.95 mg/dL Final    GFR Estimate 10/22/2024 89  >60 mL/min/1.73m2 Final    eGFR calculated using 2021 CKD-EPI equation.    Calcium 10/22/2024 9.2  8.8 - 10.4 mg/dL Final    Reference intervals for this test were updated on 7/16/2024 to reflect our healthy population more accurately. There may be differences in the flagging of prior results with similar values performed with this method. Those prior results can be interpreted in the context of the updated reference intervals.    Glucose 10/22/2024 89  70 - 99 mg/dL Final    WBC Count 10/22/2024 12.5 (H)  4.0 - 11.0 10e3/uL Final    RBC Count 10/22/2024 4.79  3.80 - 5.20 10e6/uL Final    Hemoglobin 10/22/2024 13.5  11.7 - 15.7 g/dL Final    Hematocrit 10/22/2024 42.5  35.0 - 47.0 % Final    MCV 10/22/2024 89  78 - 100 fL Final    MCH 10/22/2024 28.2  26.5 - 33.0 pg Final    MCHC 10/22/2024 31.8  31.5 - 36.5 g/dL Final    RDW 10/22/2024 13.7  10.0 - 15.0 % Final    Platelet Count 10/22/2024 260  150 - 450 10e3/uL Final    % Neutrophils 10/22/2024 60  % Final    % Lymphocytes 10/22/2024 31  % Final    % Monocytes 10/22/2024 6  % Final    % Eosinophils 10/22/2024 2  % Final    % Basophils 10/22/2024 0  % Final    % Immature Granulocytes 10/22/2024 1  % Final    Absolute Neutrophils 10/22/2024 7.5  1.6 - 8.3 10e3/uL Final    Absolute Lymphocytes 10/22/2024 3.9  0.8 - 5.3 10e3/uL Final    Absolute Monocytes 10/22/2024 0.7  0.0 - 1.3 10e3/uL Final    Absolute Eosinophils 10/22/2024 0.2  0.0 - 0.7 10e3/uL Final    Absolute Basophils 10/22/2024 0.0  0.0 - 0.2 10e3/uL Final    Absolute Immature Granulocytes 10/22/2024 0.1  <=0.4 10e3/uL Final           Signed Electronically by: MIKEL Maurer CNP

## 2024-11-07 ENCOUNTER — TELEPHONE (OUTPATIENT)
Dept: GASTROENTEROLOGY | Facility: CLINIC | Age: 44
End: 2024-11-07
Payer: COMMERCIAL

## 2024-11-07 NOTE — TELEPHONE ENCOUNTER
Attempted to contact patient in order to complete pre assessment questions.     No answer. Left message to return call to [phone] option 2.    Callback communication sent via [employer].    Corinne Kliber, RN

## 2024-11-07 NOTE — TELEPHONE ENCOUNTER
Rescheduled Colonoscopy - previous TE 12/28/23  --------------------------------------------------------------------------------------------------------------------    Patient had pneumonia last month - ok to proceed if free of symptoms (no SOB, coughing, congestion, or fever)  --------------------------------------------------------------------------------------------------------------------  Pre visit planning completed.        Procedure details:     Patient scheduled for Colonoscopy on 11/20/24    Arrival time: 0930. Procedure time 1015     Facility location: [facility]; [address] [place] [address]. Check in location: Main entrance, door #1 on the North side of the building under roundabout awning. DO NOT GO TO SURGERY/ED ENTRANCE.      Sedation type: Conscious sedation      Pre op exam needed? No.     Indication for procedure: Blood in stool, irritable bowel syndrome with both constipation and diarrhea         Chart review:      Electronic implanted devices? No     Recent diagnosis of diverticulitis within the last 6 weeks? No        Medication review:     Diabetic? No     Anticoagulants? No     Weight loss medication/injectable? No GLP-1 medication per patient's medication list.  RN will verify with pre-assessment call.     Other medication HOLDING recommendations:  N/A        Prep for procedure:      Bowel prep recommendation: Extended Golytely. Bowel prep prescription sent to [facility] [id number] [place] [facility] [place], [place] - [address] [facility] - sent previously, patient picked up?     Due to: BMI > 40.  and constipation noted or reported.      Prep instructions sent via [employer] Sold[employer], RN  Endoscopy Procedure Pre Assessment RN  [phone] option 2

## 2024-11-12 NOTE — TELEPHONE ENCOUNTER
Second call attempt to complete pre assessment.     No answer.  Left message to return call to [phone] #2 by next business day prior to 4PM or procedure will be sent to cancel.     Callback required communication sent via [employer].      Emma Porter RN  Endoscopy Procedure Pre Assessment

## 2024-11-12 NOTE — TELEPHONE ENCOUNTER
Pre assessment completed for upcoming procedure.   (Please see previous telephone encounter notes for complete details)    Patient  returned call.       Procedure details:    Arrival time and facility location reviewed.    Pre op exam needed? No.    Designated  policy reviewed. Instructed to have someone stay 6  hours post procedure.       Medication review:    Medications reviewed. Please see supporting documentation below. Holding recommendations discussed (if applicable).       Prep for procedure:     Procedure prep instructions reviewed.        Any additional information needed:  N/A      Patient  verbalized understanding and had no questions or concerns at this time.      Anita Connolly RN  Endoscopy Procedure Pre Assessment   [phone] option 2

## 2024-11-12 NOTE — TELEPHONE ENCOUNTER
PRIOR AUTHORIZATION DENIED    Medication: BUDESONIDE-FORMOTEROL FUMARATE 160-4.5 MCG/ACT IN AERO  Insurance Company: [employer] - Phone [phone] Fax [phone]  Denial Date: 11/12/2024  Denial Reason(s): Insurance states that patient does not meet coverage criteria. Please see denial letter below for more details.     Appeal Information: [employer]  Patient Notified: NO

## 2024-11-20 ENCOUNTER — HOSPITAL ENCOUNTER (OUTPATIENT)
Facility: CLINIC | Age: 44
Discharge: HOME | End: 2024-11-20
Attending: INTERNAL MEDICINE | Admitting: INTERNAL MEDICINE
Payer: COMMERCIAL

## 2024-11-20 VITALS
HEART RATE: 65 BPM | SYSTOLIC BLOOD PRESSURE: 94 MMHG | DIASTOLIC BLOOD PRESSURE: 70 MMHG | WEIGHT: 293 LBS | BODY MASS INDEX: 45.99 KG/M2 | RESPIRATION RATE: 14 BRPM | OXYGEN SATURATION: 98 % | HEIGHT: 67 IN

## 2024-11-20 LAB — COLONOSCOPY: NORMAL

## 2024-11-20 PROCEDURE — 258N000003 HC RX IP 258 OP 636: Performed by: INTERNAL MEDICINE

## 2024-11-20 PROCEDURE — 250N000013 HC RX MED GY IP 250 OP 250 PS 637: Performed by: INTERNAL MEDICINE

## 2024-11-20 PROCEDURE — 45385 COLONOSCOPY W/LESION REMOVAL: CPT | Mod: PT | Performed by: INTERNAL MEDICINE

## 2024-11-20 PROCEDURE — 88305 TISSUE EXAM BY PATHOLOGIST: CPT | Mod: 26

## 2024-11-20 PROCEDURE — 250N000011 HC RX IP 250 OP 636: Performed by: INTERNAL MEDICINE

## 2024-11-20 PROCEDURE — G0500 MOD SEDAT ENDO SERVICE >5YRS: HCPCS | Mod: PT | Performed by: INTERNAL MEDICINE

## 2024-11-20 PROCEDURE — 88305 TISSUE EXAM BY PATHOLOGIST: CPT | Mod: TC | Performed by: INTERNAL MEDICINE

## 2024-11-20 RX ORDER — EPINEPHRINE 1 MG/ML
0.1 INJECTION, SOLUTION, CONCENTRATE INTRAVENOUS
Status: DISCONTINUED | OUTPATIENT
Start: 2024-11-20 | End: 2024-11-20 | Stop reason: HOSPADM

## 2024-11-20 RX ORDER — NALOXONE HYDROCHLORIDE 0.4 MG/ML
0.2 INJECTION, SOLUTION INTRAMUSCULAR; INTRAVENOUS; SUBCUTANEOUS
Status: DISCONTINUED | OUTPATIENT
Start: 2024-11-20 | End: 2024-11-20 | Stop reason: HOSPADM

## 2024-11-20 RX ORDER — SIMETHICONE 40MG/0.6ML
133 SUSPENSION, DROPS(FINAL DOSAGE FORM)(ML) ORAL
Status: COMPLETED | OUTPATIENT
Start: 2024-11-20 | End: 2024-11-20

## 2024-11-20 RX ORDER — FLUMAZENIL 0.1 MG/ML
0.2 INJECTION, SOLUTION INTRAVENOUS
Status: DISCONTINUED | OUTPATIENT
Start: 2024-11-20 | End: 2024-11-20 | Stop reason: HOSPADM

## 2024-11-20 RX ORDER — DIPHENHYDRAMINE HYDROCHLORIDE 50 MG/ML
25-50 INJECTION INTRAMUSCULAR; INTRAVENOUS
Status: DISCONTINUED | OUTPATIENT
Start: 2024-11-20 | End: 2024-11-20 | Stop reason: HOSPADM

## 2024-11-20 RX ORDER — ONDANSETRON 2 MG/ML
4 INJECTION INTRAMUSCULAR; INTRAVENOUS
Status: DISCONTINUED | OUTPATIENT
Start: 2024-11-20 | End: 2024-11-20 | Stop reason: HOSPADM

## 2024-11-20 RX ORDER — NALOXONE HYDROCHLORIDE 0.4 MG/ML
0.4 INJECTION, SOLUTION INTRAMUSCULAR; INTRAVENOUS; SUBCUTANEOUS
Status: DISCONTINUED | OUTPATIENT
Start: 2024-11-20 | End: 2024-11-20 | Stop reason: HOSPADM

## 2024-11-20 RX ORDER — ONDANSETRON 4 MG/1
4 TABLET, ORALLY DISINTEGRATING ORAL EVERY 6 HOURS PRN
Status: DISCONTINUED | OUTPATIENT
Start: 2024-11-20 | End: 2024-11-20 | Stop reason: HOSPADM

## 2024-11-20 RX ORDER — PROCHLORPERAZINE MALEATE 10 MG
10 TABLET ORAL EVERY 6 HOURS PRN
Status: DISCONTINUED | OUTPATIENT
Start: 2024-11-20 | End: 2024-11-20 | Stop reason: HOSPADM

## 2024-11-20 RX ORDER — ONDANSETRON 2 MG/ML
4 INJECTION INTRAMUSCULAR; INTRAVENOUS EVERY 6 HOURS PRN
Status: DISCONTINUED | OUTPATIENT
Start: 2024-11-20 | End: 2024-11-20 | Stop reason: HOSPADM

## 2024-11-20 RX ORDER — FENTANYL CITRATE 50 UG/ML
50-100 INJECTION, SOLUTION INTRAMUSCULAR; INTRAVENOUS EVERY 5 MIN PRN
Status: DISCONTINUED | OUTPATIENT
Start: 2024-11-20 | End: 2024-11-20 | Stop reason: HOSPADM

## 2024-11-20 RX ORDER — LIDOCAINE 40 MG/G
CREAM TOPICAL
Status: DISCONTINUED | OUTPATIENT
Start: 2024-11-20 | End: 2024-11-20 | Stop reason: HOSPADM

## 2024-11-20 RX ORDER — ATROPINE SULFATE 0.1 MG/ML
1 INJECTION INTRAVENOUS
Status: DISCONTINUED | OUTPATIENT
Start: 2024-11-20 | End: 2024-11-20 | Stop reason: HOSPADM

## 2024-11-20 RX ADMIN — FENTANYL CITRATE 100 MCG: 50 INJECTION, SOLUTION INTRAMUSCULAR; INTRAVENOUS at 10:57

## 2024-11-20 RX ADMIN — MIDAZOLAM 2 MG: 1 INJECTION INTRAMUSCULAR; INTRAVENOUS at 10:57

## 2024-11-20 RX ADMIN — SIMETHICONE 133 MG: 20 SUSPENSION/ DROPS ORAL at 11:03

## 2024-11-20 RX ADMIN — SODIUM CHLORIDE 500 ML: 9 INJECTION, SOLUTION INTRAVENOUS at 11:21

## 2024-11-20 ASSESSMENT — ACTIVITIES OF DAILY LIVING (ADL)
ADLS_ACUITY_SCORE: 0
ADLS_ACUITY_SCORE: 0

## 2024-11-20 NOTE — H&P
Pre-Endoscopy History and Physical     Karolina Herrera MRN# [id number]   YOB: 1980 Age: 44 year old     Date of Procedure: 11/20/2024  Primary care provider: Kim Urbina  Type of Endoscopy: colonoscopy  Reason for Procedure: Bleeding  Type of Anesthesia Anticipated: Conscious Sedation    HPI:    Karolina is a 44 year old female who will be undergoing the above procedure.      A history and physical has been performed. The patient's medications and allergies have been reviewed. The risks and benefits of the procedure and the sedation options and risks were discussed with the patient.  All questions were answered and informed consent was obtained.      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  Both are . Pap neg 6/10/ 2013: record in Care Everywhere from [facility]     Family history of malignant neoplasm of breast     High risk pregnancy, antepartum 03/30/2018    Overview:  Prenatal provider & planned delivery site: [facility] Wife is Peyton.   IVF through [facility] with donor from sperm bank.  Same donor as first pregnancy with their son Huy.  Labs and records requested from [facility].   Initial prenatal bloodwork ___  GC/CT ___-  These labs were deferred to wait for records from [facility].   Pregravid BMI >40.  Early GCT ____ UC done.  Pap up to date 7/17 [facility]    Low back pain 06/18/2014    Morbid obesity due to excess calories (H) 03/25/2016        Past Surgical History:   Procedure Laterality Date    GYN SURGERY  07/2014    Polypectomy    IVS  2015    invitro fertilization    LAPAROSCOPIC CHOLECYSTECTOMY N/A 11/07/2016    Procedure: LAPAROSCOPIC CHOLECYSTECTOMY;  Surgeon: Juan F Sherman MD;  [facility]    wisdom teeth removed      [facility] SLEEP STUDY, UNATTENDED, RECORD HEART RATE/O2 SAT/RESP ANAL/SLEEP TM  3/7/2022            Relevant Family History: NONE    Relevant Social History: NONE     Prior to Admission medications    Medication Sig Start Date End Date Taking? Authorizing Provider   albuterol (PROAIR HFA/PROVENTIL HFA/VENTOLIN HFA) 108 (90 Base) MCG/ACT inhaler Inhale 2 puffs into the lungs. 10/16/24  Yes Reported, Patient   budesonide-formoterol (SYMBICORT) 160-4.5 MCG/ACT Inhaler Inhale 2 puffs into the lungs 2 times daily. 10/22/24  Yes Christiana Cedillo APRN CNP   buPROPion (WELLBUTRIN XL) 300 MG 24 hr tablet Take 300 mg by mouth every morning.   Yes Reported, Patient   escitalopram (LEXAPRO) 10 MG tablet Take 1 tablet (10 mg) by mouth daily 10/17/23  Yes Kim Urbina APRN CNP   loratadine (CLARITIN) 10 MG tablet Take 10 mg by mouth daily.   Yes Reported, Patient   LORazepam (ATIVAN) 1 MG tablet Take 1 tablet (1 mg) by mouth once as needed for anxiety (prior to breast MRI) Take with you to appointment and let the nurse know; they will tell you the appropriate time to take the medication. Please have a  to/from the appointment. 6/5/24  Yes Mary Merritt APRN CNP   fluocinonide (LIDEX) 0.05 % external cream Apply topically 2 times daily To hands and feet for dermatitis/cracking/fissuring 1/2/24   Raymundo Silva MD   fluticasone-salmeterol (ADVAIR) 250-50 MCG/ACT inhaler Inhale 1 puff into the lungs  "every 12 hours. 10/23/24   Christiana Cedillo APRN CNP       Allergies   Allergen Reactions    Dust Mites     Seasonal Allergies         REVIEW OF SYSTEMS:   A relevant review of systems was performed and was negative    PHYSICAL EXAM:   /63   Pulse 67   Resp 18   Ht 1.702 m (5' 7\")   Wt 141.1 kg (311 lb)   SpO2 97%   BMI 48.71 kg/m   Estimated body mass index is 48.71 kg/m  as calculated from the following:    Height as of this encounter: 1.702 m (5' 7\").    Weight as of this encounter: 141.1 kg (311 lb).   GENERAL APPEARANCE: alert, and oriented  MENTAL STATUS: alert  AIRWAY EXAM: Normal  RESP: lungs clear to auscultation - no rales, rhonchi or wheezes  CV: regular rates and rhythm  DIAGNOSTICS:    Not indicated    IMPRESSION   ASA Class 2 - Mild systemic disease    PLAN:   Plan for colonoscopy. We discussed the risks, benefits and alternatives and the patient wished to proceed.      Signed Electronically by: Jayson Morel MD  November 20, 2024            "

## 2024-12-03 ENCOUNTER — OFFICE VISIT (OUTPATIENT)
Dept: FAMILY MEDICINE | Facility: CLINIC | Age: 44
End: 2024-12-03
Payer: COMMERCIAL

## 2024-12-03 VITALS
OXYGEN SATURATION: 94 % | SYSTOLIC BLOOD PRESSURE: 130 MMHG | TEMPERATURE: 98.5 F | HEART RATE: 77 BPM | DIASTOLIC BLOOD PRESSURE: 83 MMHG | HEIGHT: 67 IN | BODY MASS INDEX: 45.99 KG/M2 | RESPIRATION RATE: 18 BRPM | WEIGHT: 293 LBS

## 2024-12-03 DIAGNOSIS — R10.33 PERIUMBILICAL ABDOMINAL PAIN: Primary | ICD-10-CM

## 2024-12-03 LAB
ERYTHROCYTE [DISTWIDTH] IN BLOOD BY AUTOMATED COUNT: 13.5 % (ref 10–15)
HCT VFR BLD AUTO: 41.1 % (ref 35–47)
HGB BLD-MCNC: 13.7 G/DL (ref 11.7–15.7)
MCH RBC QN AUTO: 29 PG (ref 26.5–33)
MCHC RBC AUTO-ENTMCNC: 33.3 G/DL (ref 31.5–36.5)
MCV RBC AUTO: 87 FL (ref 78–100)
PLATELET # BLD AUTO: 256 10E3/UL (ref 150–450)
RBC # BLD AUTO: 4.72 10E6/UL (ref 3.8–5.2)
WBC # BLD AUTO: 9.7 10E3/UL (ref 4–11)

## 2024-12-03 PROCEDURE — 36415 COLL VENOUS BLD VENIPUNCTURE: CPT | Performed by: PHYSICIAN ASSISTANT

## 2024-12-03 PROCEDURE — 80053 COMPREHEN METABOLIC PANEL: CPT | Performed by: PHYSICIAN ASSISTANT

## 2024-12-03 PROCEDURE — 99214 OFFICE O/P EST MOD 30 MIN: CPT | Performed by: PHYSICIAN ASSISTANT

## 2024-12-03 PROCEDURE — 85027 COMPLETE CBC AUTOMATED: CPT | Performed by: PHYSICIAN ASSISTANT

## 2024-12-03 PROCEDURE — 83690 ASSAY OF LIPASE: CPT | Performed by: PHYSICIAN ASSISTANT

## 2024-12-03 PROCEDURE — G2211 COMPLEX E/M VISIT ADD ON: HCPCS | Performed by: PHYSICIAN ASSISTANT

## 2024-12-03 ASSESSMENT — PAIN SCALES - GENERAL: PAINLEVEL_OUTOF10: MODERATE PAIN (5)

## 2024-12-03 NOTE — PROGRESS NOTES
"  Assessment & Plan     Periumbilical abdominal pain  Unclear etiology of patient's symptoms.  Differential includes but not limited to, pancreatitis, constipation, colitis, reflux, hernia, or rectus abdominis strain.  Patient CBC is unremarkable so low suspicion for any infection.  Due to patient's body habitus it was difficult to determine a hernia on exam.  However her symptoms are suspicious for this.  Due to the severity of her pain we will do a CT scan in addition to the lab work.  Follow-up pending results.  Warning signs to go to the emergency room were educated to patient.  Patient agrees with this plan and has no further questions.  - CBC with platelets; Future  - Comprehensive metabolic panel (BMP + Alb, Alk Phos, ALT, AST, Total. Bili, TP); Future  - Lipase; Future  - CT Abdomen wo & w & Pelvis w Contrast; Future  - CBC with platelets  - Comprehensive metabolic panel (BMP + Alb, Alk Phos, ALT, AST, Total. Bili, TP)  - Lipase          BMI  Estimated body mass index is 49.02 kg/m  as calculated from the following:    Height as of this encounter: 1.702 m (5' 7\").    Weight as of this encounter: 142 kg (313 lb).             Julia Turcios is a 44 year old, presenting for the following health issues:  Abdominal Pain (/)    History of Present Illness       Reason for visit:  Abdominal pain around belly button and radiating across abdomen - often severe  Symptom onset:  3-7 days ago  Symptoms include:  Intense abdominal pain  Symptom intensity:  Severe  Symptom progression:  Worsening  Had these symptoms before:  No  What makes it worse:  Coughing, lifting,pressure on abdomen, sudden movements  What makes it better:  Not really She is missing 1 dose(s) of medications per week.  She is not taking prescribed medications regularly due to remembering to take.           Concern - abdominal pain  Onset: for about a week, worse the last 4 days  Description: pain around belly button that is achy and sometimes " "sharp, sometimes it will radiate around her stomach. The pain is pretty consistent through out the day.   Intensity: moderate  Progression of Symptoms:  worsening  Accompanying Signs & Symptoms: nothing  Previous history of similar problem: nothing  Precipitating factors:        Worsened by: coughing, lifting items, pressure  Alleviating factors:        Improved by: certain positions feel better but doesn't completely go away.   Therapies tried and outcome: has not done anything at home  Regular bowel movements, regularly urination. No blood in urine or stool. No nausea or vomiting. No fevers. No rash.   2 weeks ago did a cross fit work out which she normally does.       Review of Systems  Constitutional, HEENT, cardiovascular, pulmonary, gi and gu systems are negative, except as otherwise noted.      Objective    /83   Pulse 77   Temp 98.5  F (36.9  C) (Oral)   Resp 18   Ht 1.702 m (5' 7\")   Wt 142 kg (313 lb)   LMP 10/24/2024 (Exact Date)   SpO2 94%   BMI 49.02 kg/m    Body mass index is 49.02 kg/m .  Physical Exam   GENERAL: alert and no distress  NECK: no adenopathy, no asymmetry, masses, or scars  RESP: lungs clear to auscultation - no rales, rhonchi or wheezes  CV: regular rate and rhythm, normal S1 S2, no S3 or S4, no murmur, click or rub, no peripheral edema  ABDOMEN: tenderness RUQ and umbilical, bowel sounds normal, and possible ventral hernia, difficult to determine due to patients body habitus  MS: no gross musculoskeletal defects noted, no edema    Results for orders placed or performed in visit on 12/03/24 (from the past 24 hours)   CBC with platelets   Result Value Ref Range    WBC Count 9.7 4.0 - 11.0 10e3/uL    RBC Count 4.72 3.80 - 5.20 10e6/uL    Hemoglobin 13.7 11.7 - 15.7 g/dL    Hematocrit 41.1 35.0 - 47.0 %    MCV 87 78 - 100 fL    MCH 29.0 26.5 - 33.0 pg    MCHC 33.3 31.5 - 36.5 g/dL    RDW 13.5 10.0 - 15.0 %    Platelet Count 256 150 - 450 10e3/uL           Signed " Electronically by: Isi Burden, PA

## 2024-12-04 ENCOUNTER — ANCILLARY PROCEDURE (OUTPATIENT)
Dept: CT IMAGING | Facility: CLINIC | Age: 44
End: 2024-12-04
Attending: PHYSICIAN ASSISTANT
Payer: COMMERCIAL

## 2024-12-04 ENCOUNTER — TELEPHONE (OUTPATIENT)
Dept: FAMILY MEDICINE | Facility: CLINIC | Age: 44
End: 2024-12-04

## 2024-12-04 ENCOUNTER — HOSPITAL ENCOUNTER (EMERGENCY)
Facility: CLINIC | Age: 44
Discharge: HOME | End: 2024-12-04
Attending: FAMILY MEDICINE | Admitting: FAMILY MEDICINE
Payer: COMMERCIAL

## 2024-12-04 VITALS
HEART RATE: 76 BPM | RESPIRATION RATE: 18 BRPM | DIASTOLIC BLOOD PRESSURE: 51 MMHG | SYSTOLIC BLOOD PRESSURE: 107 MMHG | OXYGEN SATURATION: 96 % | TEMPERATURE: 98.8 F

## 2024-12-04 DIAGNOSIS — K42.9 UMBILICAL HERNIA WITHOUT OBSTRUCTION AND WITHOUT GANGRENE: ICD-10-CM

## 2024-12-04 DIAGNOSIS — R10.33 PERIUMBILICAL ABDOMINAL PAIN: ICD-10-CM

## 2024-12-04 LAB
ALBUMIN SERPL BCG-MCNC: 4.2 G/DL (ref 3.5–5.2)
ALBUMIN SERPL BCG-MCNC: 4.4 G/DL (ref 3.5–5.2)
ALP SERPL-CCNC: 77 U/L (ref 40–150)
ALP SERPL-CCNC: 77 U/L (ref 40–150)
ALT SERPL W P-5'-P-CCNC: 62 U/L (ref 0–50)
ALT SERPL W P-5'-P-CCNC: 62 U/L (ref 0–50)
ANION GAP SERPL CALCULATED.3IONS-SCNC: 8 MMOL/L (ref 7–15)
ANION GAP SERPL CALCULATED.3IONS-SCNC: 9 MMOL/L (ref 7–15)
AST SERPL W P-5'-P-CCNC: 41 U/L (ref 0–45)
AST SERPL W P-5'-P-CCNC: 49 U/L (ref 0–45)
BASOPHILS # BLD AUTO: 0.1 10E3/UL (ref 0–0.2)
BASOPHILS NFR BLD AUTO: 1 %
BILIRUB SERPL-MCNC: 0.4 MG/DL
BILIRUB SERPL-MCNC: 0.4 MG/DL
BUN SERPL-MCNC: 12.6 MG/DL (ref 6–20)
BUN SERPL-MCNC: 15.1 MG/DL (ref 6–20)
CALCIUM SERPL-MCNC: 9.7 MG/DL (ref 8.8–10.4)
CALCIUM SERPL-MCNC: 9.7 MG/DL (ref 8.8–10.4)
CHLORIDE SERPL-SCNC: 102 MMOL/L (ref 98–107)
CHLORIDE SERPL-SCNC: 102 MMOL/L (ref 98–107)
CREAT SERPL-MCNC: 0.78 MG/DL (ref 0.51–0.95)
CREAT SERPL-MCNC: 0.81 MG/DL (ref 0.51–0.95)
CRP SERPL-MCNC: 12.5 MG/L
EGFRCR SERPLBLD CKD-EPI 2021: >90 ML/MIN/1.73M2
EGFRCR SERPLBLD CKD-EPI 2021: >90 ML/MIN/1.73M2
EOSINOPHIL # BLD AUTO: 0.1 10E3/UL (ref 0–0.7)
EOSINOPHIL NFR BLD AUTO: 1 %
ERYTHROCYTE [DISTWIDTH] IN BLOOD BY AUTOMATED COUNT: 14 % (ref 10–15)
GLUCOSE SERPL-MCNC: 100 MG/DL (ref 70–99)
GLUCOSE SERPL-MCNC: 93 MG/DL (ref 70–99)
HCO3 SERPL-SCNC: 27 MMOL/L (ref 22–29)
HCO3 SERPL-SCNC: 28 MMOL/L (ref 22–29)
HCT VFR BLD AUTO: 40.9 % (ref 35–47)
HGB BLD-MCNC: 13.5 G/DL (ref 11.7–15.7)
IMM GRANULOCYTES # BLD: 0 10E3/UL
IMM GRANULOCYTES NFR BLD: 1 %
INR PPP: 0.97 (ref 0.85–1.15)
LACTATE SERPL-SCNC: 0.9 MMOL/L (ref 0.7–2)
LIPASE SERPL-CCNC: 38 U/L (ref 13–60)
LYMPHOCYTES # BLD AUTO: 2.7 10E3/UL (ref 0.8–5.3)
LYMPHOCYTES NFR BLD AUTO: 31 %
MCH RBC QN AUTO: 29 PG (ref 26.5–33)
MCHC RBC AUTO-ENTMCNC: 33 G/DL (ref 31.5–36.5)
MCV RBC AUTO: 88 FL (ref 78–100)
MONOCYTES # BLD AUTO: 0.6 10E3/UL (ref 0–1.3)
MONOCYTES NFR BLD AUTO: 7 %
NEUTROPHILS # BLD AUTO: 5.2 10E3/UL (ref 1.6–8.3)
NEUTROPHILS NFR BLD AUTO: 60 %
NRBC # BLD AUTO: 0 10E3/UL
NRBC BLD AUTO-RTO: 0 /100
PLATELET # BLD AUTO: 256 10E3/UL (ref 150–450)
POTASSIUM SERPL-SCNC: 4.3 MMOL/L (ref 3.4–5.3)
POTASSIUM SERPL-SCNC: 4.7 MMOL/L (ref 3.4–5.3)
PROT SERPL-MCNC: 7.1 G/DL (ref 6.4–8.3)
PROT SERPL-MCNC: 7.5 G/DL (ref 6.4–8.3)
RADIOLOGIST FLAGS: ABNORMAL
RBC # BLD AUTO: 4.65 10E6/UL (ref 3.8–5.2)
SODIUM SERPL-SCNC: 137 MMOL/L (ref 135–145)
SODIUM SERPL-SCNC: 139 MMOL/L (ref 135–145)
WBC # BLD AUTO: 8.7 10E3/UL (ref 4–11)

## 2024-12-04 PROCEDURE — 36415 COLL VENOUS BLD VENIPUNCTURE: CPT | Performed by: FAMILY MEDICINE

## 2024-12-04 PROCEDURE — 80053 COMPREHEN METABOLIC PANEL: CPT | Performed by: FAMILY MEDICINE

## 2024-12-04 PROCEDURE — 86140 C-REACTIVE PROTEIN: CPT | Performed by: FAMILY MEDICINE

## 2024-12-04 PROCEDURE — 85610 PROTHROMBIN TIME: CPT | Performed by: FAMILY MEDICINE

## 2024-12-04 PROCEDURE — 74177 CT ABD & PELVIS W/CONTRAST: CPT | Performed by: RADIOLOGY

## 2024-12-04 PROCEDURE — 99233 SBSQ HOSP IP/OBS HIGH 50: CPT | Mod: 24 | Performed by: SURGERY

## 2024-12-04 PROCEDURE — 83605 ASSAY OF LACTIC ACID: CPT | Performed by: FAMILY MEDICINE

## 2024-12-04 PROCEDURE — 85004 AUTOMATED DIFF WBC COUNT: CPT | Performed by: FAMILY MEDICINE

## 2024-12-04 PROCEDURE — 84295 ASSAY OF SERUM SODIUM: CPT | Performed by: FAMILY MEDICINE

## 2024-12-04 PROCEDURE — 99284 EMERGENCY DEPT VISIT MOD MDM: CPT | Performed by: FAMILY MEDICINE

## 2024-12-04 PROCEDURE — 99284 EMERGENCY DEPT VISIT MOD MDM: CPT | Mod: 25 | Performed by: FAMILY MEDICINE

## 2024-12-04 RX ORDER — IOPAMIDOL 755 MG/ML
149 INJECTION, SOLUTION INTRAVASCULAR ONCE
Status: COMPLETED | OUTPATIENT
Start: 2024-12-04 | End: 2024-12-04

## 2024-12-04 RX ORDER — LIDOCAINE 40 MG/G
CREAM TOPICAL
Status: DISCONTINUED | OUTPATIENT
Start: 2024-12-04 | End: 2024-12-04 | Stop reason: HOSPADM

## 2024-12-04 RX ADMIN — IOPAMIDOL 149 ML: 755 INJECTION, SOLUTION INTRAVASCULAR at 09:10

## 2024-12-04 ASSESSMENT — COLUMBIA-SUICIDE SEVERITY RATING SCALE - C-SSRS
1. IN THE PAST MONTH, HAVE YOU WISHED YOU WERE DEAD OR WISHED YOU COULD GO TO SLEEP AND NOT WAKE UP?: NO
2. HAVE YOU ACTUALLY HAD ANY THOUGHTS OF KILLING YOURSELF IN THE PAST MONTH?: NO
6. HAVE YOU EVER DONE ANYTHING, STARTED TO DO ANYTHING, OR PREPARED TO DO ANYTHING TO END YOUR LIFE?: NO

## 2024-12-04 ASSESSMENT — ACTIVITIES OF DAILY LIVING (ADL)
ADLS_ACUITY_SCORE: 41

## 2024-12-04 NOTE — DISCHARGE INSTRUCTIONS
Home.  You were seen by surgery in the ER who evaluated your labs and CT and felt the hernia had now reduced itself.  OK home to monitor for symptoms.  Return if severe pain, nausea vomiting or other concerns.  [facility] to call you for outpatient follow up for repair.    Please make an appointment to follow up with [facility](phone: [phone]) as soon as possible. They will call you for appointment.    Results for orders placed or performed during the hospital encounter of 12/04/24   CRP inflammation     Status: Abnormal   Result Value Ref Range    CRP Inflammation 12.50 (H) <5.00 mg/L   INR     Status: Normal   Result Value Ref Range    INR 0.97 0.85 - 1.15   Comprehensive metabolic panel     Status: Abnormal   Result Value Ref Range    Sodium 137 135 - 145 mmol/L    Potassium 4.3 3.4 - 5.3 mmol/L    Carbon Dioxide (CO2) 27 22 - 29 mmol/L    Anion Gap 8 7 - 15 mmol/L    Urea Nitrogen 12.6 6.0 - 20.0 mg/dL    Creatinine 0.78 0.51 - 0.95 mg/dL    GFR Estimate >90 >60 mL/min/1.73m2    Calcium 9.7 8.8 - 10.4 mg/dL    Chloride 102 98 - 107 mmol/L    Glucose 100 (H) 70 - 99 mg/dL    Alkaline Phosphatase 77 40 - 150 U/L    AST 49 (H) 0 - 45 U/L    ALT 62 (H) 0 - 50 U/L    Protein Total 7.1 6.4 - 8.3 g/dL    Albumin 4.2 3.5 - 5.2 g/dL    Bilirubin Total 0.4 <=1.2 mg/dL   Lactic Acid Whole Blood with 1X Repeat in 2 HR when >2     Status: Normal   Result Value Ref Range    Lactic Acid, Initial 0.9 0.7 - 2.0 mmol/L   CBC with platelets and differential     Status: None   Result Value Ref Range    WBC Count 8.7 4.0 - 11.0 10e3/uL    RBC Count 4.65 3.80 - 5.20 10e6/uL    Hemoglobin 13.5 11.7 - 15.7 g/dL    Hematocrit 40.9 35.0 - 47.0 %    MCV 88 78 - 100 fL    MCH 29.0 26.5 - 33.0 pg    MCHC 33.0 31.5 - 36.5 g/dL    RDW 14.0 10.0 - 15.0 %    Platelet Count 256 150 - 450 10e3/uL    % Neutrophils 60 %    % Lymphocytes 31 %    % Monocytes 7 %    % Eosinophils 1 %    % Basophils 1 %    % Immature Granulocytes  1 %    NRBCs per 100 WBC 0 <1 /100    Absolute Neutrophils 5.2 1.6 - 8.3 10e3/uL    Absolute Lymphocytes 2.7 0.8 - 5.3 10e3/uL    Absolute Monocytes 0.6 0.0 - 1.3 10e3/uL    Absolute Eosinophils 0.1 0.0 - 0.7 10e3/uL    Absolute Basophils 0.1 0.0 - 0.2 10e3/uL    Absolute Immature Granulocytes 0.0 <=0.4 10e3/uL    Absolute NRBCs 0.0 10e3/uL   CBC with platelets differential     Status: None    Narrative    The following orders were created for panel order CBC with platelets differential.  Procedure                               Abnormality         Status                     ---------                               -----------         ------                     CBC with platelets and d...[[id number]]                      Final result                 Please view results for these tests on the individual orders.   Results for orders placed or performed in visit on 12/04/24   CT Abdomen Pelvis w Contrast     Status: Abnormal   Result Value Ref Range    Radiologist flags Incarcerated umbilical hernia. (Urgent)     Narrative    EXAMINATION: CT ABDOMEN PELVIS W CONTRAST, 12/4/2024 9:21 AM    INDICATION: Periumbilical abdominal pain    COMPARISON STUDY: CT 11/20/2020    TECHNIQUE: CT scan of the abdomen and pelvis was performed on  multidetector CT scanner using volumetric acquisition technique and  images were reconstructed in multiple planes with variable thickness  and reviewed on dedicated workstations.     CONTRAST: Isovue 370 149cc injected IV    CT scan radiation dose is optimized to minimum requisite dose using  automated dose modulation techniques.    FINDINGS:    Lower thorax: Bibasilar dependent subsegmental atelectasis.    Liver: No mass. No intrahepatic biliary ductal dilation.    Biliary System: Surgically absent gallbladder.    Pancreas: No mass or pancreatic ductal dilation.    Adrenal glands: No mass or nodules    Spleen: Normal.    Kidneys: No suspicious mass, obstructing calculus or  hydronephrosis.    Gastrointestinal tract :Normal appendix. Normal caliber small bowel.   Few scattered colonic diverticulosis with no diverticulitis.    Mesentery/peritoneum/retroperitoneum: No mass. No free fluid or air.    Lymph nodes: No significant lymphadenopathy.    Vasculature: Patent major abdominal vasculature.    Pelvis: Urinary bladder is normal.    Osseous structures: No aggressive or acute osseous lesion.      Soft tissues: Small fat-containing umbilical hernia measuring 4.7 x  4.2 cm. There is stranding of the herniated fat and fluid within the  hernia sac. Hernia neck measures 2.5 cm.  Subcutaneous rounded  calcification in the lateral right lower abdomen.      Impression    IMPRESSION:   Small umbilical hernia containing fat with stranding and fluid  concerning for incarceration.    [Urgent Result: Incarcerated umbilical hernia.]    Finding was identified on 12/4/2024 9:23 AM.     Anant Faith Triage nurse was contacted by Dr. Iraheta at 12/4/2024 9:23  AM and verbalized understanding of the urgent finding.     I have personally reviewed the examination and initial interpretation  and I agree with the findings.    CATALINA ALVAREZ MD         SYSTEM ID:  [id number]

## 2024-12-04 NOTE — DISCHARGE INSTRUCTIONS

## 2024-12-04 NOTE — ED PROVIDER NOTES
[place] EMERGENCY DEPARTMENT ([facility])    12/04/24       ED PROVIDER NOTE   [address]  History     Chief Complaint   Patient presents with    Abdominal Pain     The history is provided by the patient and medical records.     Karolina Herrera is a 44 year old female who was sent into the emergency department after finding of incarcerated hernia on imaging studies.  She presented to clinic yesterday reporting periumbilical abdominal pain.  They did consider hernia versus pancreatitis, constipation etc. on differential but unable to really determine a hernia on exam due to habitus.  Her CT scan today showed a small umbilical hernia containing fat with stranding and fluid concerning for incarceration.  She was notified of these findings and told to come to the ED for further evaluation.  Patient notes that she has had abdominal pain on and off for the last month of the last few days it has accelerated although feeling some better no nausea vomiting no constipation.  No fevers or chills.  Previous cholecystectomy in the past but no other surgeries reported.    Past Medical History  Past Medical History:   Diagnosis Date    Abnormal vaginal bleeding 2008 and 2009    Amblyopia     Anxiety     At high risk for breast cancer     Encounter for gynecological examination without abnormal finding 10/19/2015    Overview:  Wife is Peyton.  Both are . Pap neg 6/10/ 2013: record in Care Everywhere from [place]     Family history of malignant neoplasm of breast     High risk pregnancy, antepartum 03/30/2018    Overview:  Prenatal provider & planned delivery site: [facility] Wife is Peyton.   IVF through [facility] with donor from sperm bank.  Same donor as first pregnancy with their son Huy.  Labs and records requested from [facility].   Initial prenatal bloodwork ___  GC/CT ___- These labs were deferred to wait for records from [facility].   Pregravid BMI >40.  Early GCT ____ UC done.  Pap up to date 7/17 Gen[facility]    Low  back pain 2014    Morbid obesity due to excess calories (H) 2016     Past Surgical History:   Procedure Laterality Date    COLONOSCOPY N/A 2024    Procedure: Colonoscopy with polypectomies by cold snare;  Surgeon: Jayson Morel MD;  Location:  GI    GYN SURGERY  2014    Polypectomy    IVS      invitro fertilization    LAPAROSCOPIC CHOLECYSTECTOMY N/A 2016    Procedure: LAPAROSCOPIC CHOLECYSTECTOMY;  Surgeon: Juan F Sherman MD;  [facility]    wisdom teeth removed      [facility] SLEEP STUDY, UNATTENDED, RECORD HEART RATE/O2 SAT/RESP ANAL/SLEEP TM  3/7/2022          albuterol (PROAIR HFA/PROVENTIL HFA/VENTOLIN HFA) 108 (90 Base) MCG/ACT inhaler  budesonide-formoterol (SYMBICORT) 160-4.5 MCG/ACT Inhaler  buPROPion (WELLBUTRIN XL) 300 MG 24 hr tablet  escitalopram (LEXAPRO) 10 MG tablet  fluocinonide (LIDEX) 0.05 % external cream  fluticasone-salmeterol (ADVAIR) 250-50 MCG/ACT inhaler  loratadine (CLARITIN) 10 MG tablet  LORazepam (ATIVAN) 1 MG tablet      Allergies   Allergen Reactions    Dust Mites     Seasonal Allergies      Family History  Family History   Problem Relation Age of Onset    Skin Cancer Mother     Glaucoma Father     Atrial fibrillation Father     Amblyopia Father     Glaucoma Maternal Grandmother     Macular Degeneration Maternal Grandmother     Esophageal Cancer Maternal Grandfather          in 60s; hx of smoking    Bladder Cancer Paternal Grandfather          in 80s    Other - See Comments Brother          of murder    Breast Cancer Sister 39        invasive ductal carcinoma, treated with lumpectomy, radiation and tamoxifen    Amblyopia Daughter     Attention Deficit Disorder Daughter     Strabismus Daughter     Breast Cancer Paternal Aunt         paternal great aunt in 70s    Glasses (<7 y/o) No family hx of     Diabetes No family hx of     Colon Cancer No family hx of      Social History   Social History     Tobacco Use    Smoking status: Never     Smokeless tobacco: Never   Vaping Use    Vaping status: Never Used   Substance Use Topics    Alcohol use: Yes     Comment: 1-2 per week or less    Drug use: No      A medically appropriate review of systems was performed with pertinent positives and negatives noted in the HPI, and all other systems negative.    Physical Exam   BP: (!) 140/81  Pulse: 90  Temp: 98.8  F (37.1  C)  Resp: 18  SpO2: 96 %    Physical Exam  Vitals and nursing note reviewed.   Constitutional:       General: She is in acute distress.      Appearance: Normal appearance. She is well-developed. She is not toxic-appearing.   HENT:      Head: Normocephalic and atraumatic.      Nose: Nose normal.      Mouth/Throat:      Mouth: Mucous membranes are moist.      Pharynx: Oropharynx is clear.   Eyes:      General: No scleral icterus.     Extraocular Movements: Extraocular movements intact.      Conjunctiva/sclera: Conjunctivae normal.      Pupils: Pupils are equal, round, and reactive to light.   Cardiovascular:      Rate and Rhythm: Normal rate and regular rhythm.   Pulmonary:      Effort: Pulmonary effort is normal. No respiratory distress.      Breath sounds: No stridor.   Abdominal:      General: Abdomen is flat. There is no distension.      Palpations: Abdomen is soft. There is no mass.      Tenderness: There is abdominal tenderness. There is no guarding or rebound.      Hernia: No hernia is present.      Comments: Patient with periumbilical tenderness but I do not feel any fullness or mass or hernia at this point.  No rebound or guarding rigidity.   Musculoskeletal:      Cervical back: Normal range of motion and neck supple. No rigidity.   Skin:     General: Skin is warm and dry.      Capillary Refill: Capillary refill takes less than 2 seconds.      Coloration: Skin is not jaundiced or pale.      Findings: No rash.   Neurological:      General: No focal deficit present.      Mental Status: She is alert and oriented to person, place, and time.  Mental status is at baseline.   Psychiatric:      Comments: Appropriate here in the ER       ED Course, Procedures, & Data         Records are in epic.  CT findings noted today concerning for small carcinoid umbilical hernia with some fat and fluid etc.  Some stranding noted.  No bowel obstruction noted.  Labs reviewed as reviewed.    In the ER patient IV established.  Labs drawn reviewed consultation with general surgery did see the patient in the ER and reviewed the CT findings and labs also.    CRP is 12.5.  Sodium 137 potassium 4.3.  Bicarb 27 gap is 8 creatinine is 0.78.  Glucose 100  White count 8.7 hemoglobin is 13.5.  AST is 49 ALT is 62 lactic acid 0.9.      As noted patient valuated by general surgery here in the ER.  Discussed with them afterwards at this point patient had spontaneous resolution of the umbilical hernia at this point exam is currently unimpressive at this point patient feels fine comfortable plan general surgery will make arrangements outpatient for outpatient clinic appointment and plan for outpatient management of umbilical hernia repair.  Patient feels fine discussed with patient what to watch for and reasons to return agrees with plan is comfortable discharge.    ED Course as of 12/04/24 2003   Wed Dec 04, 2024   1239 Paging surgery     Procedures                 Results for orders placed or performed during the hospital encounter of 12/04/24   CRP inflammation     Status: Abnormal   Result Value Ref Range    CRP Inflammation 12.50 (H) <5.00 mg/L   INR     Status: Normal   Result Value Ref Range    INR 0.97 0.85 - 1.15   Comprehensive metabolic panel     Status: Abnormal   Result Value Ref Range    Sodium 137 135 - 145 mmol/L    Potassium 4.3 3.4 - 5.3 mmol/L    Carbon Dioxide (CO2) 27 22 - 29 mmol/L    Anion Gap 8 7 - 15 mmol/L    Urea Nitrogen 12.6 6.0 - 20.0 mg/dL    Creatinine 0.78 0.51 - 0.95 mg/dL    GFR Estimate >90 >60 mL/min/1.73m2    Calcium 9.7 8.8 - 10.4 mg/dL    Chloride  102 98 - 107 mmol/L    Glucose 100 (H) 70 - 99 mg/dL    Alkaline Phosphatase 77 40 - 150 U/L    AST 49 (H) 0 - 45 U/L    ALT 62 (H) 0 - 50 U/L    Protein Total 7.1 6.4 - 8.3 g/dL    Albumin 4.2 3.5 - 5.2 g/dL    Bilirubin Total 0.4 <=1.2 mg/dL   Lactic Acid Whole Blood with 1X Repeat in 2 HR when >2     Status: Normal   Result Value Ref Range    Lactic Acid, Initial 0.9 0.7 - 2.0 mmol/L   CBC with platelets and differential     Status: None   Result Value Ref Range    WBC Count 8.7 4.0 - 11.0 10e3/uL    RBC Count 4.65 3.80 - 5.20 10e6/uL    Hemoglobin 13.5 11.7 - 15.7 g/dL    Hematocrit 40.9 35.0 - 47.0 %    MCV 88 78 - 100 fL    MCH 29.0 26.5 - 33.0 pg    MCHC 33.0 31.5 - 36.5 g/dL    RDW 14.0 10.0 - 15.0 %    Platelet Count 256 150 - 450 10e3/uL    % Neutrophils 60 %    % Lymphocytes 31 %    % Monocytes 7 %    % Eosinophils 1 %    % Basophils 1 %    % Immature Granulocytes 1 %    NRBCs per 100 WBC 0 <1 /100    Absolute Neutrophils 5.2 1.6 - 8.3 10e3/uL    Absolute Lymphocytes 2.7 0.8 - 5.3 10e3/uL    Absolute Monocytes 0.6 0.0 - 1.3 10e3/uL    Absolute Eosinophils 0.1 0.0 - 0.7 10e3/uL    Absolute Basophils 0.1 0.0 - 0.2 10e3/uL    Absolute Immature Granulocytes 0.0 <=0.4 10e3/uL    Absolute NRBCs 0.0 10e3/uL   CBC with platelets differential     Status: None    Narrative    The following orders were created for panel order CBC with platelets differential.  Procedure                               Abnormality         Status                     ---------                               -----------         ------                     CBC with platelets and d...[[id number]]                      Final result                 Please view results for these tests on the individual orders.   Results for orders placed or performed in visit on 12/04/24   CT Abdomen Pelvis w Contrast     Status: Abnormal   Result Value Ref Range    Radiologist flags Incarcerated umbilical hernia. (Urgent)     Narrative    EXAMINATION: CT ABDOMEN  PELVIS W CONTRAST, 12/4/2024 9:21 AM    INDICATION: Periumbilical abdominal pain    COMPARISON STUDY: CT 11/20/2020    TECHNIQUE: CT scan of the abdomen and pelvis was performed on  multidetector CT scanner using volumetric acquisition technique and  images were reconstructed in multiple planes with variable thickness  and reviewed on dedicated workstations.     CONTRAST: Isovue 370 149cc injected IV    CT scan radiation dose is optimized to minimum requisite dose using  automated dose modulation techniques.    FINDINGS:    Lower thorax: Bibasilar dependent subsegmental atelectasis.    Liver: No mass. No intrahepatic biliary ductal dilation.    Biliary System: Surgically absent gallbladder.    Pancreas: No mass or pancreatic ductal dilation.    Adrenal glands: No mass or nodules    Spleen: Normal.    Kidneys: No suspicious mass, obstructing calculus or hydronephrosis.    Gastrointestinal tract :Normal appendix. Normal caliber small bowel.   Few scattered colonic diverticulosis with no diverticulitis.    Mesentery/peritoneum/retroperitoneum: No mass. No free fluid or air.    Lymph nodes: No significant lymphadenopathy.    Vasculature: Patent major abdominal vasculature.    Pelvis: Urinary bladder is normal.    Osseous structures: No aggressive or acute osseous lesion.      Soft tissues: Small fat-containing umbilical hernia measuring 4.7 x  4.2 cm. There is stranding of the herniated fat and fluid within the  hernia sac. Hernia neck measures 2.5 cm.  Subcutaneous rounded  calcification in the lateral right lower abdomen.      Impression    IMPRESSION:   Small umbilical hernia containing fat with stranding and fluid  concerning for incarceration.    [Urgent Result: Incarcerated umbilical hernia.]    Finding was identified on 12/4/2024 9:23 AM.     Anant Faith, Triage nurse was contacted by Dr. Iraheta at 12/4/2024 9:23  AM and verbalized understanding of the urgent finding.     I have personally reviewed the examination  and initial interpretation  and I agree with the findings.    CATALINA ALVAREZ MD         SYSTEM ID:  [id number]     Medications - No data to display  Labs Ordered and Resulted from Time of ED Arrival to Time of ED Departure   CRP INFLAMMATION - Abnormal       Result Value    CRP Inflammation 12.50 (*)    COMPREHENSIVE METABOLIC PANEL - Abnormal    Sodium 137      Potassium 4.3      Carbon Dioxide (CO2) 27      Anion Gap 8      Urea Nitrogen 12.6      Creatinine 0.78      GFR Estimate >90      Calcium 9.7      Chloride 102      Glucose 100 (*)     Alkaline Phosphatase 77      AST 49 (*)     ALT 62 (*)     Protein Total 7.1      Albumin 4.2      Bilirubin Total 0.4     INR - Normal    INR 0.97     LACTIC ACID WHOLE BLOOD WITH 1X REPEAT IN 2 HR WHEN >2 - Normal    Lactic Acid, Initial 0.9     CBC WITH PLATELETS AND DIFFERENTIAL    WBC Count 8.7      RBC Count 4.65      Hemoglobin 13.5      Hematocrit 40.9      MCV 88      MCH 29.0      MCHC 33.0      RDW 14.0      Platelet Count 256      % Neutrophils 60      % Lymphocytes 31      % Monocytes 7      % Eosinophils 1      % Basophils 1      % Immature Granulocytes 1      NRBCs per 100 WBC 0      Absolute Neutrophils 5.2      Absolute Lymphocytes 2.7      Absolute Monocytes 0.6      Absolute Eosinophils 0.1      Absolute Basophils 0.1      Absolute Immature Granulocytes 0.0      Absolute NRBCs 0.0       No orders to display          Critical care was not performed.     Medical Decision Making  The patient's presentation was of moderate complexity (an acute illness with systemic symptoms).    The patient's evaluation involved:  review of external note(s) from 3+ sources (see separate area of note for details)  review of 3+ test result(s) ordered prior to this encounter (see separate area of note for details)  ordering and/or review of 3+ test(s) in this encounter (see separate area of note for details)  discussion of management or test interpretation with another health  professional (see separate area of note for details)    The patient's management necessitated high risk (a decision regarding hospitalization).    Assessment & Plan   44-year-old female with increasing abdominal pain the last several days.  No nausea vomiting noted.  Has CT scan done today arranged by clinic which did show a small incarcerated umbilical hernia with fat fluid and stranding.  Patient's abdomen otherwise benign etc. I did not appreciate any obvious hernia at this point in the ER patient otherwise appears nontoxic labs stable seen by surgery with consultation this point feel spontaneous resolution of the hernia patient can be managed as outpatient they will arrange outpatient appointment for outpatient management umbilical hernia patient is return if any worsening symptoms agrees with plan feels fine currently this point exam otherwise benign.       I have reviewed the nursing notes. I have reviewed the findings, diagnosis, plan and need for follow up with the patient.    Discharge Medication List as of 12/4/2024  4:48 PM          Final diagnoses:   Umbilical hernia without obstruction and without gangrene     IMarta, am serving as a trained medical scribe to document services personally performed by Raymundo Nguyễn MD based on the provider's statements to me on December 4, 2024.  This document has been checked and approved by the attending provider.    IRaymundo MD, was physically present and have reviewed and verified the accuracy of this note documented by Marta Hampton, medical scribe.      Raymundo Nguyễn MD   [facility] EMERGENCY DEPARTMENT  12/4/2024    This note was created at least in part by the use of dragon voice dictation system. Inadvertent typographical errors may still exist.  Raymundo Nguyễn MD.  Patient evaluated in the emergency department during the COVID-19 pandemic period. Careful attention to patients safety was addressed throughout the  evaluation. Evaluation and treatment management was initiated with disposition made efficiently and appropriate as possible to minimize any risk of potential exposure to patient during this evaluation.          Raymundo Nguyễn MD  12/04/24 2006

## 2024-12-04 NOTE — ED TRIAGE NOTES
PT arrives per referral. CT showed strangulated umbilical hernia. PT verbalizes she has had abd pain for a few months that worsened over the last couple days. Denies N/V/D and fevers.     Triage Assessment (Adult)       Row Name 12/04/24 1224          Triage Assessment    Airway WDL WDL        Respiratory WDL    Respiratory WDL WDL        Skin Circulation/Temperature WDL    Skin Circulation/Temperature WDL WDL        Cardiac WDL    Cardiac WDL WDL        Peripheral/Neurovascular WDL    Peripheral Neurovascular WDL WDL        Cognitive/Neuro/Behavioral WDL    Cognitive/Neuro/Behavioral WDL WDL

## 2024-12-04 NOTE — TELEPHONE ENCOUNTER
Routing to Isi Burden    Patient had stat CT today. Urgent finding    Concern for umbilical hernia being incarcerated.  Results not yet posted      RN received call from Dr Chadwick regarding above.    Anant Faith, RN, BSN, PHN  [facility]

## 2024-12-04 NOTE — TELEPHONE ENCOUNTER
I reviewed CT scan results with patient over the phone today.  Results show that she has a strangulated and concerning for incarcerated umbilical hernia.  Based on this and the severity of her pain I do recommend that she go to the ER and likely will need surgery.  Patient agrees with this plan and has no further questions.    Thank you,  Isi Burden PA-C

## 2024-12-04 NOTE — PROGRESS NOTES
Brief surgery progress note:  Full consult note to follow    Patient has umbilical hernia from previous laparoscopic port site from lap esteban several years ago.  Hernia is completely reducible (patient was yes able to reduce herself).   No current need for surgical intervention/admission.  Patient can go home.  We will follow up with her in clinic (we will arrange it).    Okay to discharge from surgery perspective    Plan discussed with staff LUX Lang  PGY2 General Surgery  [facility]

## 2024-12-05 NOTE — TELEPHONE ENCOUNTER
REFERRAL INFORMATION:  Referring Provider:    Referring Clinic:    Reason for Visit/Diagnosis: Umbilical hernia, per patient. Records in [facility], per patient.        FUTURE VISIT INFORMATION:  Appointment Date: 12/12/24  Appointment Time:      NOTES RECORD STATUS  DETAILS   OFFICE NOTE from Referring Provider N/A    OFFICE NOTE from Other Specialists Internal 12/3/24-[facility] DISCHARGE SUMMARY/ ED VISITS  Internal ED 12/4/24-Dr. Nguyễn-[facility]  Dr. Alexander 12/4/24   OPERATIVE REPORT Internal 11/7/16-Laparoscopic Cholecystectomy -Dr. Sherman   PERTINENT LABS Internal    IMAGING (CT, MRI, US, XR)  Internal 12/4/24 CT abdomen pelvis     Records Requested    Facility    Fax:    Outcome

## 2024-12-05 NOTE — CONSULTS
EGS Surgery Consult  2024    Karolina Herrera  : 1980    Date of Service: 2024     Assessment and Plan:  Karolina Herrera is a 44 year old female  with h/o laparoscopic cholecystectomy 6-7 years ago presents with incarcerated umbilical hernia likely containing omental fat based on independent review of CT scan.    Does not take blood thinners.       Hernia is completely reducible (patient was able to reduce herself in the ED).   No current need for surgical intervention/admission.  Patient can go home.  We will follow up with her in clinic for follow up about elective repair of the hernia (we will arrange it).     Okay to discharge from surgery perspective     Plan discussed with staff LUX Lang  PGY2 General Surgery  [facility]       History of Present Illness:    Karolina Herrera is a 44 year old female  with h/o laparoscopic cholecystectomy 6-7 years ago presents to ED with a few months of periumbilical abdominal pain which had worsened over last few days.   No associated recent fever/chills/nausea/vomiting    She was evaluated in the clinic yesterday and was advised to undergo CT scan which was done today showing incarcerated umbilical hernia, hence she presented to the ED.     Past Medical History:  Past Medical History:   Diagnosis Date    Abnormal vaginal bleeding  and     Amblyopia     Anxiety     At high risk for breast cancer     Encounter for gynecological examination without abnormal finding 10/19/2015    Overview:  Wife is Peyton.  Both are . Pap neg 6/10/ 2013: record in Care Everywhere from [place]     Family history of malignant neoplasm of breast     High risk pregnancy, antepartum 2018    Overview:  Prenatal provider & planned delivery site: [facility] [place] [facility] Wife is Peyton.   IVF through [facility] with donor from sperm bank.  Same donor as first pregnancy with their son Huy.  Labs and records requested from [facility].    Initial prenatal bloodwork ___  GC/CT ___- These labs were deferred to wait for records from [place].   Pregravid BMI >40.  Early GCT ____ UC done.  Pap up to date  Geneti    Low back pain 2014    Morbid obesity due to excess calories (H) 2016       Past Surgical History  Past Surgical History:   Procedure Laterality Date    COLONOSCOPY N/A 2024    Procedure: Colonoscopy with polypectomies by cold snare;  Surgeon: Jayson Morel MD;  Location:  GI    GYN SURGERY  2014    Polypectomy    IVS      invitro fertilization    LAPAROSCOPIC CHOLECYSTECTOMY N/A 2016    Procedure: LAPAROSCOPIC CHOLECYSTECTOMY;  Surgeon: Juan F Sherman MD;  [facility]    wisdom teeth removed      ZZC SLEEP STUDY, UNATTENDED, RECORD HEART RATE/O2 SAT/RESP ANAL/SLEEP TM  3/7/2022            Family History:  Family History   Problem Relation Age of Onset    Skin Cancer Mother     Glaucoma Father     Atrial fibrillation Father     Amblyopia Father     Glaucoma Maternal Grandmother     Macular Degeneration Maternal Grandmother     Esophageal Cancer Maternal Grandfather          in 60s; hx of smoking    Bladder Cancer Paternal Grandfather          in 80s    Other - See Comments Brother          of murder    Breast Cancer Sister 39        invasive ductal carcinoma, treated with lumpectomy, radiation and tamoxifen    Amblyopia Daughter     Attention Deficit Disorder Daughter     Strabismus Daughter     Breast Cancer Paternal Aunt         paternal great aunt in 70s    Glasses (<7 y/o) No family hx of     Diabetes No family hx of     Colon Cancer No family hx of        Social History:    Social History     Socioeconomic History    Marital status:      Spouse name: Samantha    Number of children: 2    Years of education: Not on file    Highest education level: Not on file   Occupational History     Comment: works for [employer]   Tobacco Use    Smoking status: Never    Smokeless  tobacco: Never   Vaping Use    Vaping status: Never Used   Substance and Sexual Activity    Alcohol use: Yes     Comment: 1-2 per week or less    Drug use: No    Sexual activity: Yes     Partners: Female     Birth control/protection: None   Other Topics Concern     Service No    Blood Transfusions No    Caffeine Concern No    Occupational Exposure No    Hobby Hazards No    Sleep Concern No    Stress Concern No    Weight Concern No    Special Diet No    Back Care No    Exercise Yes     Comment: walks dog 2-3 times/day; prenatal exercises    Bike Helmet Not Asked    Seat Belt Yes    Self-Exams Yes    Parent/sibling w/ CABG, MI or angioplasty before 65F 55M? No   Social History Narrative    Not on file     Social Drivers of Health     Financial Resource Strain: Low Risk  (10/17/2023)    Financial Resource Strain     Within the past 12 months, have you or your family members you live with been unable to get utilities (heat, electricity) when it was really needed?: No   Food Insecurity: Low Risk  (10/17/2023)    Food Insecurity     Within the past 12 months, did you worry that your food would run out before you got money to buy more?: No     Within the past 12 months, did the food you bought just not last and you didn t have money to get more?: No   Transportation Needs: Low Risk  (10/17/2023)    Transportation Needs     Within the past 12 months, has lack of transportation kept you from medical appointments, getting your medicines, non-medical meetings or appointments, work, or from getting things that you need?: No   Physical Activity: Not on file   Stress: Not on file   Social Connections: Not on file   Interpersonal Safety: Low Risk  (11/20/2024)    Interpersonal Safety     Do you feel physically and emotionally safe where you currently live?: Yes     Within the past 12 months, have you been hit, slapped, kicked or otherwise physically hurt by someone?: No     Within the past 12 months, have you been  humiliated or emotionally abused in other ways by your partner or ex-partner?: No   Housing Stability: Low Risk  (10/17/2023)    Housing Stability     Do you have housing? : Yes     Are you worried about losing your housing?: No       Medications:  No blood thinners intake  Current Outpatient Medications   Medication Sig Dispense Refill    albuterol (PROAIR HFA/PROVENTIL HFA/VENTOLIN HFA) 108 (90 Base) MCG/ACT inhaler Inhale 2 puffs into the lungs. (Patient not taking: Reported on 12/3/2024)      budesonide-formoterol (SYMBICORT) 160-4.5 MCG/ACT Inhaler Inhale 2 puffs into the lungs 2 times daily. (Patient not taking: Reported on 12/3/2024) 10.2 g 0    buPROPion (WELLBUTRIN XL) 300 MG 24 hr tablet Take 300 mg by mouth every morning.      escitalopram (LEXAPRO) 10 MG tablet Take 1 tablet (10 mg) by mouth daily      fluocinonide (LIDEX) 0.05 % external cream Apply topically 2 times daily To hands and feet for dermatitis/cracking/fissuring (Patient not taking: Reported on 12/3/2024) 60 g 4    fluticasone-salmeterol (ADVAIR) 250-50 MCG/ACT inhaler Inhale 1 puff into the lungs every 12 hours. (Patient not taking: Reported on 12/3/2024) 14 each 0    loratadine (CLARITIN) 10 MG tablet Take 10 mg by mouth daily.      LORazepam (ATIVAN) 1 MG tablet Take 1 tablet (1 mg) by mouth once as needed for anxiety (prior to breast MRI) Take with you to appointment and let the nurse know; they will tell you the appropriate time to take the medication. Please have a  to/from the appointment. (Patient not taking: Reported on 12/3/2024) 1 tablet 0       Allergies:     Allergies   Allergen Reactions    Dust Mites     Seasonal Allergies        Review of Symptoms:  A 10 point review of symptoms has been conducted and is negative except for that mentioned in the above HPI.    Physical Exam:    Blood pressure 107/51, pulse 76, temperature 98.8  F (37.1  C), temperature source Oral, resp. rate 18, last menstrual period 10/24/2024, SpO2  96%, not currently breastfeeding.  Gen:    Lying in bed in NAD, A&OX3  HEENT: Normocephalic and atraumatic  CV:  RRR  Pulm:  Non-labored breathing on room air  Abd:  Soft, obese, mildly tender in umbilical area, ~2.5 cm fascial defect palpable without any bowel contents, completely reducible, abdomen non-distended  Ext:  Warm and well perfused, no obvious deformities  Neuro:              No gross focal deficits    Labs:  CBC RESULTS:   Recent Labs   Lab Test 12/04/24  1315   WBC 8.7   RBC 4.65   HGB 13.5   HCT 40.9   MCV 88   MCH 29.0   MCHC 33.0   RDW 14.0        BMP RESULTS:   Recent Labs   Lab 12/04/24  1315      POTASSIUM 4.3   CHLORIDE 102   CO2 27   BUN 12.6   CR 0.78   *     LFT RESULTS:   Recent Labs   Lab 12/04/24  1315   AST 49*   ALT 62*   ALKPHOS 77   BILITOTAL 0.4   ALBUMIN 4.2   INR 0.97       Imaging: CTAP 12/4/2024    IMPRESSION:   Small umbilical hernia containing fat with stranding and fluid  concerning for incarceration.- likely omental fat based on independent review     [Urgent Result: Incarcerated umbilical hernia.]     Finding was identified on 12/4/2024 9:23 AM.      Anant Faith Triage nurse was contacted by Dr. Iraheta at 12/4/2024 9:23  AM and verbalized understanding of the urgent finding.      I have personally reviewed the examination and initial interpretation  and I agree with the findings.     CATALINA ALVAREZ MD

## 2024-12-11 ENCOUNTER — PATIENT OUTREACH (OUTPATIENT)
Dept: GASTROENTEROLOGY | Facility: CLINIC | Age: 44
End: 2024-12-11
Payer: COMMERCIAL

## 2024-12-11 ENCOUNTER — DOCUMENTATION ONLY (OUTPATIENT)
Dept: SLEEP MEDICINE | Facility: CLINIC | Age: 44
End: 2024-12-11
Payer: COMMERCIAL

## 2024-12-11 DIAGNOSIS — G47.33 OSA ON CPAP: Primary | ICD-10-CM

## 2024-12-11 PROBLEM — D12.6 ADENOMATOUS COLON POLYP: Status: ACTIVE | Noted: 2024-12-11

## 2024-12-12 ENCOUNTER — OFFICE VISIT (OUTPATIENT)
Dept: SURGERY | Facility: CLINIC | Age: 44
End: 2024-12-12
Payer: COMMERCIAL

## 2024-12-12 ENCOUNTER — PRE VISIT (OUTPATIENT)
Dept: SURGERY | Facility: CLINIC | Age: 44
End: 2024-12-12

## 2024-12-12 ENCOUNTER — TELEPHONE (OUTPATIENT)
Dept: ENDOCRINOLOGY | Facility: CLINIC | Age: 44
End: 2024-12-12

## 2024-12-12 VITALS
WEIGHT: 293 LBS | DIASTOLIC BLOOD PRESSURE: 63 MMHG | OXYGEN SATURATION: 94 % | BODY MASS INDEX: 45.99 KG/M2 | HEART RATE: 75 BPM | HEIGHT: 67 IN | SYSTOLIC BLOOD PRESSURE: 96 MMHG

## 2024-12-12 DIAGNOSIS — E66.1 CLASS 3 DRUG-INDUCED OBESITY WITH SERIOUS COMORBIDITY AND BODY MASS INDEX (BMI) OF 45.0 TO 49.9 IN ADULT (H): Primary | ICD-10-CM

## 2024-12-12 DIAGNOSIS — E66.813 CLASS 3 DRUG-INDUCED OBESITY WITH SERIOUS COMORBIDITY AND BODY MASS INDEX (BMI) OF 45.0 TO 49.9 IN ADULT (H): Primary | ICD-10-CM

## 2024-12-12 ASSESSMENT — PAIN SCALES - GENERAL: PAINLEVEL_OUTOF10: NO PAIN (0)

## 2024-12-12 NOTE — PATIENT INSTRUCTIONS
You met with Dr. Lesli Mccollum.      Today's visit instructions:    Dr. Mccollum has referred you to Medical Weight Management to help you get down to a BMI of 30. Please call us once you have achieved this goal.       If you have questions please contact Courtney RN or Aimee RN during regular clinic hours, Monday through Friday 7:30 AM - 4:00 PM, or you can contact us via [employer] at anytime.       If you have urgent needs after-hours, weekends, or holidays please call the hospital at [phone] and ask to speak with our on-call General Surgery Team.    Appointment [phone]  Nurse Advice (Courtney or Aimee): [phone]   Surgery Scheduler (Amee): [phone]  Fax: [phone]

## 2024-12-12 NOTE — PROGRESS NOTES
Surgery Clinic Outpatient Progress Note  December 12, 2024  Karolina Herrera  [id number]    S: Karolina Herrera is a 44 year old female who presents to the clinic in follow-up of a hernia evaluated in the clinic. She was recently seen in the ED after a CT scan revealed possibly incarcerated fat in an umbilical hernia.  It reduced while in the ED and she was sent here for possible management.     ROS: As above, and she denies any new complaints.  O:  Body mass index is 49.68 kg/m .  317 lbs 3.2 oz   Pulse:  [75] 75  BP: (96)/(63) 96/63  SpO2:  [94 %] 94 %  Drain: The patient does not have any drains or tubes we are monitoring.  Physical Exam:  Gen: Alert, oriented, no distress  Psych: Normal affect  Neuro: No focal deficit  Resp: Quiet breathing  CV: Warm and well perfused.  Abd: Rounded, nontender, umbilical hernia superior edge palpable.  Ext: No obvious deformities  Results:    Imaging:  CT 12/4 incarcerated omental fat in umbilical hernia with an approx 2 cm neck    A/P:   Karolina Herrera is a 44 year old female with an umbilical hernia.  Due to her high BMI and the fact she carries her weight in the midsection, there is a very high risk of recurrence after repair.  She would need to lose weight to get to a BMI of 30 prior to repair.  This is understandably disappointing for her. We offered a referral to the weight management clinic, which she accepts.    After the appointment I discussed this with another surgeon in the clinic who agrees with this management plan.    Total time 25 mins, the majority of which was spent in counseling.  Lesli Mccollum MD FACS  Professor of Surgery  Pager [phone]

## 2024-12-12 NOTE — LETTER
12/12/2024       RE: Karolina Herrera  [address]  [facility] [address]     Dear Colleague,    Thank you for referring your patient, Karolina Herrera, to the [facility] [place] at [facility]. Please see a copy of my visit note below.    Surgery Clinic Outpatient Progress Note  December 12, 2024  Karolina Herrera  [id number]    S: Karolina Herrera is a 44 year old female who presents to the clinic in follow-up of a hernia evaluated in the clinic. She was recently seen in the ED after a CT scan revealed possibly incarcerated fat in an umbilical hernia.  It reduced while in the ED and she was sent here for possible management.     ROS: As above, and she denies any new complaints.  O:  Body mass index is 49.68 kg/m .  317 lbs 3.2 oz   Pulse:  [75] 75  BP: (96)/(63) 96/63  SpO2:  [94 %] 94 %  Drain: The patient does not have any drains or tubes we are monitoring.  Physical Exam:  Gen: Alert, oriented, no distress  Psych: Normal affect  Neuro: No focal deficit  Resp: Quiet breathing  CV: Warm and well perfused.  Abd: Rounded, nontender, umbilical hernia superior edge palpable.  Ext: No obvious deformities  Results:    Imaging:  CT 12/4 incarcerated omental fat in umbilical hernia with an approx 2 cm neck    A/P:   Karolina Herrera is a 44 year old female with an umbilical hernia.  Due to her high BMI and the fact she carries her weight in the midsection, there is a very high risk of recurrence after repair.  She would need to lose weight to get to a BMI of 30 prior to repair.  This is understandably disappointing for her. We offered a referral to the weight management clinic, which she accepts.    After the appointment I discussed this with another surgeon in the clinic who agrees with this management plan.    Total time 25 mins, the majority of which was spent in counseling.  Lesli Mccollum MD FACS  Professor of  Surgery  Pager [phone]         Again, thank you for allowing me to participate in the care of your patient.      Sincerely,    Lesli Mccollum MD

## 2024-12-12 NOTE — NURSING NOTE
"Chief Complaint   Patient presents with    New Patient     Umbilical hernia       Vitals:    12/12/24 0704   BP: 96/63   BP Location: Left arm   Patient Position: Sitting   Cuff Size: Adult Large   Pulse: 75   SpO2: 94%   Weight: 143.9 kg (317 lb 3.2 oz)   Height: 1.702 m (5' 7\")       Body mass index is 49.68 kg/m .                          Derrick Espinosa, EMT    "

## 2024-12-12 NOTE — TELEPHONE ENCOUNTER
Left Voicemail (1st Attempt) and Sent Mychart (1st Attempt) for the patient to call back and schedule the following:    Appointment type: New Weight Management  Appointment mode: In Person or Virtual Visit  Provider: Jessica Wood NP, Lubna Cazares PA-C, Breanna Bello PA-C, or Lakesha Capone PA-C  Return date: Approx. Next available  Specialty phone number: Direct  Additional Notes: New Zak Slot [place] per Ira Serrano  new MWM, lose weight before consideration for hernia repair

## 2024-12-16 ENCOUNTER — TELEPHONE (OUTPATIENT)
Dept: ENDOCRINOLOGY | Facility: CLINIC | Age: 44
End: 2024-12-16
Payer: COMMERCIAL

## 2024-12-16 NOTE — TELEPHONE ENCOUNTER
Left Voicemail (2nd Attempt) and Sent [employer] (2nd Attempt) for the patient to call back and schedule the following:    Appointment type: New Weight Management  Appointment mode: In Person or Virtual Visit  Provider: Jessica Wood NP, Lubna Cazares PA-C, Breanna Bello PA-C, or Lakesha Capone PA-C  Return date: Approx. Next available  Specialty phone number: left Direct  Additional Notes:   assist with losing weight to a BMI of 30 to be considered for hernia repair  andrea Crespo Slot [place] Per Ira Serrano

## 2025-01-04 ENCOUNTER — HEALTH MAINTENANCE LETTER (OUTPATIENT)
Age: 45
End: 2025-01-04

## 2025-01-30 NOTE — PROGRESS NOTES
Oncology Risk Management Consultation:  Date on this visit: 2025    Karolina Herrera requires heightened screening and surveillance for her higher risk of breast cancer, secondary to a  family history of invasive ductal carcinoma of the breast in her sister at age 39.  She is considered to be at high risk for breast cancer and has a 24.4% lifetime risk for breast cancer by the TIEN model. Her risk within the next 5 years is 1.6% by TIEN.     Primary Physician:  MIKEL Maurer CNP    History Of Present Illness:  Ms. Herrera is a very pleasant, healthy 45 year old female who presents with a family history of breast cancer.      Genetic testing:  No genetic testing to date. Her sister, who had invasive ductal carcinoma at age 39, by report, was negative for genetic mutations in the BRCA1 and BRCA2 genes and Karolina has not had genetic testing.      Pertinent health history:    Menarche at 12.  First child at age 35, conceived using one cycle of IVF.   Premenopausal.  Ovaries and uterus intact.   OCP use for one year.  Polycystic ovarian syndrome  Scattered fibroglandular densities.  Hx of mastitis in  with breastfeeding, resolved with antibiotics.  Hx of right breast pain with lactation. No infection 2019; treated with tylenol and ibuprofen. Suspected clogged milk duct.  History of breastfeeding x 13 months for Huy and 4-5 months for Celia   History of breast biopsy 1 breast biopsy in 2022: negative for atypia or malignancy  No history of hyperplasia, atypia or malignancy     Pertinent Screening History:  2016 - Screening mammogram, BiRads1  2017 - Breast MRI, BiRads1  2017 - Screening mammogram, BiRads1  2019 - Breast MRI, BiRads1  2020- Screening tomosynthesis mammogram, BiRads1  2021- Screening tomosynthesis mammogram, BiRads1  2021- Breast MRI,  BiRads1  2022:  Screening tomosynthesis mammogram, BiRads1  2022: Breast MRI-  BiRads4  12/15/2022: MRI guided biopsy- negative for atypia or malignancy  6/23/2023: Breast MRI: BiRads2  1/23/2024: Screening tomosynthesis mammogram, BiRads1  8/22/2024: Breast MRI, BiRads2     At this visit, she denies new fatigue, breast pain, asymmetry, lumps, masses, thickening, nipple discharge and skin changes in her breasts.      Past Medical/Surgical History:  Past Medical History:   Diagnosis Date    Abnormal vaginal bleeding 2008 and 2009    Amblyopia     Anxiety     At high risk for breast cancer     Encounter for gynecological examination without abnormal finding 10/19/2015    Overview:  Wife is Peyton.  Both are . Pap neg 6/10/ 2013: record in Care Everywhere from [facility]     Family history of malignant neoplasm of breast     High risk pregnancy, antepartum 03/30/2018    Overview:  [place]ren[place] provider & [place] [facility] [facility] Wife is Peyton.   IVF through [facility] with donor from sperm bank.  Same donor as first pregnancy with their son Huy.  Labs and records requested from [facility].   Initial prenatal bloodwork ___  GC/CT ___- These labs were deferred to wait for records from [facility].   Pregravid BMI >40.  Early GCT ____ UC done.  Pap up to date 7/17 Geneti    Low back pain 06/18/2014    Morbid obesity due to excess calories (H) 03/25/2016     Past Surgical History:   Procedure Laterality Date    COLONOSCOPY N/A 11/20/2024    Procedure: Colonoscopy with polypectomies by cold snare;  Surgeon: Jayson Morel MD;  Location:  GI    GYN SURGERY  07/2014    Polypectomy    IVS  2015    invitro fertilization    LAPAROSCOPIC CHOLECYSTECTOMY N/A 11/07/2016    Procedure: LAPAROSCOPIC CHOLECYSTECTOMY;  Surgeon: Juan F Sherman MD;  [facility]    wisdom teeth removed      [facility] SLEEP STUDY, UNATTENDED, RECORD HEART RATE/O2 SAT/RESP ANAL/SLEEP TM  3/7/2022            Allergies:  Allergies as of 02/04/2025 - Reviewed 02/04/2025   Allergen Reaction Noted    Dust mites  02/12/2024    Seasonal  allergies  2024       Current Medications:  Current Outpatient Medications   Medication Sig Dispense Refill    albuterol (PROAIR HFA/PROVENTIL HFA/VENTOLIN HFA) 108 (90 Base) MCG/ACT inhaler Inhale 2 puffs into the lungs. (Patient not taking: Reported on 2024)      budesonide-formoterol (SYMBICORT) 160-4.5 MCG/ACT Inhaler Inhale 2 puffs into the lungs 2 times daily. (Patient not taking: Reported on 2024) 10.2 g 0    buPROPion (WELLBUTRIN XL) 300 MG 24 hr tablet Take 300 mg by mouth every morning.      escitalopram (LEXAPRO) 10 MG tablet Take 1 tablet (10 mg) by mouth daily      fluocinonide (LIDEX) 0.05 % external cream Apply topically 2 times daily To hands and feet for dermatitis/cracking/fissuring (Patient not taking: Reported on 2024) 60 g 4    loratadine (CLARITIN) 10 MG tablet Take 10 mg by mouth daily.      LORazepam (ATIVAN) 1 MG tablet Take 1 tablet (1 mg) by mouth once as needed for anxiety (prior to breast MRI) Take with you to appointment and let the nurse know; they will tell you the appropriate time to take the medication. Please have a  to/from the appointment. (Patient not taking: Reported on 2024) 1 tablet 0        Family History:  Family History   Problem Relation Age of Onset    Skin Cancer Mother     Glaucoma Father     Atrial fibrillation Father     Amblyopia Father     Glaucoma Maternal Grandmother     Macular Degeneration Maternal Grandmother     Esophageal Cancer Maternal Grandfather          in 60s; hx of smoking    Bladder Cancer Paternal Grandfather          in 80s    Other - See Comments Brother          of murder    Breast Cancer Sister 39        invasive ductal carcinoma, treated with lumpectomy, radiation and tamoxifen    Amblyopia Daughter     Attention Deficit Disorder Daughter     Strabismus Daughter     Breast Cancer Paternal Aunt         paternal great aunt in 70s    Glasses (<9 y/o) No family hx of     Diabetes No family hx of      Colon Cancer No family hx of        Social History:  Social History     Socioeconomic History    Marital status:      Spouse name: Samantha    Number of children: 2    Years of education: Not on file    Highest education level: Not on file   Occupational History     Comment: works for [employer]   Tobacco Use    Smoking status: Never    Smokeless tobacco: Never   Vaping Use    Vaping status: Never Used   Substance and Sexual Activity    Alcohol use: Yes     Comment: 1-2 per week or less    Drug use: No    Sexual activity: Yes     Partners: Female     Birth control/protection: None   Other Topics Concern     Service No    Blood Transfusions No    Caffeine Concern No    Occupational Exposure No    Hobby Hazards No    Sleep Concern No    Stress Concern No    Weight Concern No    Special Diet No    Back Care No    Exercise Yes     Comment: walks dog 2-3 times/day; prenatal exercises    Bike Helmet Not Asked    Seat Belt Yes    Self-Exams Yes    Parent/sibling w/ CABG, MI or angioplasty before 65F 55M? No   Social History Narrative    Not on file     Social Drivers of Health     Financial Resource Strain: Low Risk  (10/17/2023)    Financial Resource Strain     Within the past 12 months, have you or your family members you live with been unable to get utilities (heat, electricity) when it was really needed?: No   Food Insecurity: Low Risk  (10/17/2023)    Food Insecurity     Within the past 12 months, did you worry that your food would run out before you got money to buy more?: No     Within the past 12 months, did the food you bought just not last and you didn t have money to get more?: No   Transportation Needs: Low Risk  (10/17/2023)    Transportation Needs     Within the past 12 months, has lack of transportation kept you from medical appointments, getting your medicines, non-medical meetings or appointments, work, or from getting things that you need?: No   Physical Activity: Not on file    Stress: Not on file   Social Connections: Not on file   Interpersonal Safety: Low Risk  (11/20/2024)    Interpersonal Safety     Do you feel physically and emotionally safe where you currently live?: Yes     Within the past 12 months, have you been hit, slapped, kicked or otherwise physically hurt by someone?: No     Within the past 12 months, have you been humiliated or emotionally abused in other ways by your partner or ex-partner?: No   Housing Stability: Low Risk  (10/17/2023)    Housing Stability     Do you have housing? : Yes     Are you worried about losing your housing?: No       Physical Exam:  /85 (BP Location: Right arm, Patient Position: Sitting, Cuff Size: Adult Large)   Pulse 86   Temp 97.6  F (36.4  C) (Oral)   Resp 18   Wt (!) 144.2 kg (317 lb 14.4 oz)   LMP 01/27/2025 (Within Days)   SpO2 96%   BMI 49.79 kg/m    GENERAL APPEARANCE: healthy, alert and no apparent distress  BREAST: A multipositional, bilateral breast exam was performed.  Fairly symmetrical -Rsl>L. Nipples everted bilaterally. Right breast: no palpable dominant masses, no nipple discharge, no skin changes.  Right axilla: no palpable adenopathy. Left breast: no palpable dominant masses, no nipple discharge, no skin changes. Left axilla: no palpable adenopathy.   LYMPHATICS: No cervical, supraclavicular, or axillary lymphadenopathy  SKIN: no suspicious lesions or rashes on examined    Laboratory/Imaging Studies  No results found for any visits on 02/04/25.    ASSESSMENT    Karolina reports that she is doing well at this visit. She is staying busy with her 6 and 9 year old daughters. She has no concerns with her breast tissue, and no updates to her family's medical history.     We discussed the screening plan below. She will be due for a breast MRI in August, and to return to see me in February, 2026 with a mammogram following our visit. We discussed concerns and symptoms to be watchful for between visits, including breast  lumps, bumps, nipple inversion, nipple discharge and any changes in skin including crinkled or puckered skin. We reviewed the recommendation for breast self awareness.       Individualized Surveillance Plan for women  With 20% or greater lifetime risk of breast cancer   Per NCCN Breast Cancer Screening and Diagnosis Guidelines Version 2.2024   Recommended screening Test or procedure Last done Next Scheduled    Clinical encounter Clinical exam every 6-12 months.   Refer to genetic counseling if not already done.  Consider risk reduction strategies.   February 2025 February 2026   However, some family histories with breast cancers at a very young age, may warrant screening starting earlier.    *May begin at age 40 if breast cancers in the family occur at later ages.    Annual mammogram beginning 10 years younger than the earliest breast cancer in the family but not prior to age 30.    Recommend annual breast MRI to begin 10 years younger than the earliest breast cancer in the family but not prior to age 25.    Breast MRIs are preferably done on day 7-15 of the menstrual cycle in premenopausal women.   1/23/2024: Screening tomosynthesis mammogram, BiRads1    8/22/2024: Breast MRI, BiRads2   Mammogram today    Breast MRI in August    Return to clinic in February, 2026 with a mammogram following our visit   Breast screening for patients at high risk due to thoracic radiation between the ages of 10-30   Annual clinical exam beginning 8 years after radiation therapy.    Annual screening mammogram beginning at age 30 or 8 years after radiation therapy    Annual breast MRI, beginning at age 25 or 8 years after radiation therapy.     NA   NA   Women who have a lifetime risk of >20% based on history of LCIS or ADH/ALH Annual screening mammogram beginning at age of LCIS or ADH/ALH but not prior to age 30.    Consider annual MRI to begin at age of diagnosis of LCIS or ADH/ALH but not prior to age 25.    Consider risk reducing  strategies.   NA   NA    Recommend risk reducing strategies for women with 1.7% 5 year risk of breast cancer.           I spent a total of 19 minutes on the day of the visit. Please see the note for further information on patient assessment and treatment.     Mary Merritt DNP, APRRACHAEL, AGCNS-BC  Clinical Nurse Specialist  Cancer Risk Management Program  [facility] [place]    Cc:  MIKEL Maurer, CNP

## 2025-02-04 ENCOUNTER — ANCILLARY PROCEDURE (OUTPATIENT)
Dept: MAMMOGRAPHY | Facility: CLINIC | Age: 45
End: 2025-02-04
Payer: COMMERCIAL

## 2025-02-04 ENCOUNTER — ONCOLOGY VISIT (OUTPATIENT)
Dept: ONCOLOGY | Facility: CLINIC | Age: 45
End: 2025-02-04
Payer: COMMERCIAL

## 2025-02-04 VITALS
BODY MASS INDEX: 49.79 KG/M2 | DIASTOLIC BLOOD PRESSURE: 85 MMHG | WEIGHT: 293 LBS | OXYGEN SATURATION: 96 % | TEMPERATURE: 97.6 F | HEART RATE: 86 BPM | RESPIRATION RATE: 18 BRPM | SYSTOLIC BLOOD PRESSURE: 134 MMHG

## 2025-02-04 DIAGNOSIS — Z80.3 FAMILY HISTORY OF MALIGNANT NEOPLASM OF BREAST: ICD-10-CM

## 2025-02-04 DIAGNOSIS — Z12.39 BREAST CANCER SCREENING, HIGH RISK PATIENT: ICD-10-CM

## 2025-02-04 DIAGNOSIS — Z12.39 BREAST CANCER SCREENING, HIGH RISK PATIENT: Primary | ICD-10-CM

## 2025-02-04 PROCEDURE — 99213 OFFICE O/P EST LOW 20 MIN: CPT

## 2025-02-04 PROCEDURE — 77063 BREAST TOMOSYNTHESIS BI: CPT | Performed by: RADIOLOGY

## 2025-02-04 PROCEDURE — 77067 SCR MAMMO BI INCL CAD: CPT | Performed by: RADIOLOGY

## 2025-02-04 PROCEDURE — 99212 OFFICE O/P EST SF 10 MIN: CPT

## 2025-02-04 ASSESSMENT — PAIN SCALES - GENERAL: PAINLEVEL_OUTOF10: NO PAIN (0)

## 2025-02-04 NOTE — PATIENT INSTRUCTIONS
Individualized Surveillance Plan for women  With 20% or greater lifetime risk of breast cancer   Per NCCN Breast Cancer Screening and Diagnosis Guidelines Version 2.2024   Recommended screening Test or procedure Last done Next Scheduled    Clinical encounter Clinical exam every 6-12 months.   Refer to genetic counseling if not already done.  Consider risk reduction strategies.   February 2025 February 2026   However, some family histories with breast cancers at a very young age, may warrant screening starting earlier.    *May begin at age 40 if breast cancers in the family occur at later ages.    Annual mammogram beginning 10 years younger than the earliest breast cancer in the family but not prior to age 30.    Recommend annual breast MRI to begin 10 years younger than the earliest breast cancer in the family but not prior to age 25.    Breast MRIs are preferably done on day 7-15 of the menstrual cycle in premenopausal women.   1/23/2024: Screening tomosynthesis mammogram, BiRads1    8/22/2024: Breast MRI, BiRads2   Mammogram today    Breast MRI in August    Return to clinic in February, 2026 with a mammogram following our visit   Breast screening for patients at high risk due to thoracic radiation between the ages of 10-30   Annual clinical exam beginning 8 years after radiation therapy.    Annual screening mammogram beginning at age 30 or 8 years after radiation therapy    Annual breast MRI, beginning at age 25 or 8 years after radiation therapy.     NA   NA   Women who have a lifetime risk of >20% based on history of LCIS or ADH/ALH Annual screening mammogram beginning at age of LCIS or ADH/ALH but not prior to age 30.    Consider annual MRI to begin at age of diagnosis of LCIS or ADH/ALH but not prior to age 25.    Consider risk reducing strategies.   NA   NA    Recommend risk reducing strategies for women with 1.7% 5 year risk of breast cancer.

## 2025-02-04 NOTE — LETTER
2025      Karolina Herrera  [address]  [facility] [address]      Dear Colleague,    Thank you for referring your patient, Karolina Herrera, to the [facility]. Please see a copy of my visit note below.    Oncology Risk Management Consultation:  Date on this visit: 2025    Karolina Herrera requires heightened screening and surveillance for her higher risk of breast cancer, secondary to a  family history of invasive ductal carcinoma of the breast in her sister at age 39.  She is considered to be at high risk for breast cancer and has a 24.4% lifetime risk for breast cancer by the TIEN model. Her risk within the next 5 years is 1.6% by TIEN.     Primary Physician:  MIKEL Maurer CNP    History Of Present Illness:  Ms. Herrera is a very pleasant, healthy 45 year old female who presents with a family history of breast cancer.      Genetic testing:  No genetic testing to date. Her sister, who had invasive ductal carcinoma at age 39, by report, was negative for genetic mutations in the BRCA1 and BRCA2 genes and Karolina has not had genetic testing.      Pertinent health history:    Menarche at 12.  First child at age 35, conceived using one cycle of IVF.   Premenopausal.  Ovaries and uterus intact.   OCP use for one year.  Polycystic ovarian syndrome  Scattered fibroglandular densities.  Hx of mastitis in  with breastfeeding, resolved with antibiotics.  Hx of right breast pain with lactation. No infection 2019; treated with tylenol and ibuprofen. Suspected clogged milk duct.  History of breastfeeding x 13 months for Huy and 4-5 months for Celia   History of breast biopsy 1 breast biopsy in 2022: negative for atypia or malignancy  No history of hyperplasia, atypia or malignancy     Pertinent Screening History:  2016 - Screening mammogram, BiRads1  2017 - Breast MRI, BiRads1  2017 - Screening mammogram, BiRads1  2019 -  Breast MRI, BiRads1  2/7/2020- Screening tomosynthesis mammogram, BiRads1  4/23/2021- Screening tomosynthesis mammogram, BiRads1  11/29/2021- Breast MRI,  BiRads1  4/29/2022:  Screening tomosynthesis mammogram, BiRads1  12/9/2022: Breast MRI- BiRads4  12/15/2022: MRI guided biopsy- negative for atypia or malignancy  6/23/2023: Breast MRI: BiRads2  1/23/2024: Screening tomosynthesis mammogram, BiRads1  8/22/2024: Breast MRI, BiRads2     At this visit, she denies new fatigue, breast pain, asymmetry, lumps, masses, thickening, nipple discharge and skin changes in her breasts.      Past Medical/Surgical History:  Past Medical History:   Diagnosis Date     Abnormal vaginal bleeding 2008 and 2009     Amblyopia      Anxiety      At high risk for breast cancer      Encounter for gynecological examination without abnormal finding 10/19/2015    Overview:  Wife is Peyton.  Both are . Pap neg 6/10/ 2013: record in Care Everywhere from [place]      Family history of malignant neoplasm of breast      High risk pregnancy, antepartum 03/30/2018    Overview:  Prenatal provider & planned delivery site: [facility] Wife is Peyton.   IVF through [place] [facility] with donor from sperm bank.  Same donor as first pregnancy with their son Huy.  Labs and records requested from [place].   Initial prenatal bloodwork ___  GC/CT ___- These labs were deferred to wait for records from [place].   Pregravid BMI >40.  Early GCT ____ UC done.  Pap up to date 7/17 Geneti     Low back pain 06/18/2014     Morbid obesity due to excess calories (H) 03/25/2016     Past Surgical History:   Procedure Laterality Date     COLONOSCOPY N/A 11/20/2024    Procedure: Colonoscopy with polypectomies by cold snare;  Surgeon: Jayson Morel MD;  Location:  GI     GYN SURGERY  07/2014    Polypectomy     IVS  2015    invitro fertilization     LAPAROSCOPIC CHOLECYSTECTOMY N/A 11/07/2016    Procedure: LAPAROSCOPIC CHOLECYSTECTOMY;  Surgeon: Juan F Sherman MD;  [facility]     wisdom teeth removed       [facility] SLEEP STUDY, UNATTENDED, RECORD HEART RATE/O2 SAT/RESP ANAL/SLEEP TM  3/7/2022            Allergies:  Allergies as of 2025 - Reviewed 2025   Allergen Reaction Noted     Dust mites  2024     Seasonal allergies  2024       Current Medications:  Current Outpatient Medications   Medication Sig Dispense Refill     albuterol (PROAIR HFA/PROVENTIL HFA/VENTOLIN HFA) 108 (90 Base) MCG/ACT inhaler Inhale 2 puffs into the lungs. (Patient not taking: Reported on 2024)       budesonide-formoterol (SYMBICORT) 160-4.5 MCG/ACT Inhaler Inhale 2 puffs into the lungs 2 times daily. (Patient not taking: Reported on 2024) 10.2 g 0     buPROPion (WELLBUTRIN XL) 300 MG 24 hr tablet Take 300 mg by mouth every morning.       escitalopram (LEXAPRO) 10 MG tablet Take 1 tablet (10 mg) by mouth daily       fluocinonide (LIDEX) 0.05 % external cream Apply topically 2 times daily To hands and feet for dermatitis/cracking/fissuring (Patient not taking: Reported on 2024) 60 g 4     loratadine (CLARITIN) 10 MG tablet Take 10 mg by mouth daily.       LORazepam (ATIVAN) 1 MG tablet Take 1 tablet (1 mg) by mouth once as needed for anxiety (prior to breast MRI) Take with you to appointment and let the nurse know; they will tell you the appropriate time to take the medication. Please have a  to/from the appointment. (Patient not taking: Reported on 2024) 1 tablet 0        Family History:  Family History   Problem Relation Age of Onset     Skin Cancer Mother      Glaucoma Father      Atrial fibrillation Father      Amblyopia Father      Glaucoma Maternal Grandmother      Macular Degeneration Maternal Grandmother      Esophageal Cancer Maternal Grandfather          in 60s; hx of smoking     Bladder Cancer Paternal Grandfather          in 80s     Other - See Comments Brother          of murder     Breast Cancer Sister 39        invasive ductal  carcinoma, treated with lumpectomy, radiation and tamoxifen     Amblyopia Daughter      Attention Deficit Disorder Daughter      Strabismus Daughter      Breast Cancer Paternal Aunt         paternal great aunt in 70s     Glasses (<9 y/o) No family hx of      Diabetes No family hx of      Colon Cancer No family hx of        Social History:  Social History     Socioeconomic History     Marital status:      Spouse name: Samantha     Number of children: 2     Years of education: Not on file     Highest education level: Not on file   Occupational History     Comment: works for [employer]   Tobacco Use     Smoking status: Never     Smokeless tobacco: Never   Vaping Use     Vaping status: Never Used   Substance and Sexual Activity     Alcohol use: Yes     Comment: 1-2 per week or less     Drug use: No     Sexual activity: Yes     Partners: Female     Birth control/protection: None   Other Topics Concern      Service No     Blood Transfusions No     Caffeine Concern No     Occupational Exposure No     Hobby Hazards No     Sleep Concern No     Stress Concern No     Weight Concern No     Special Diet No     Back Care No     Exercise Yes     Comment: walks dog 2-3 times/day; prenatal exercises     Bike Helmet Not Asked     Seat Belt Yes     Self-Exams Yes     Parent/sibling w/ CABG, MI or angioplasty before 65F 55M? No   Social History Narrative     Not on file     Social Drivers of Health     Financial Resource Strain: Low Risk  (10/17/2023)    Financial Resource Strain      Within the past 12 months, have you or your family members you live with been unable to get utilities (heat, electricity) when it was really needed?: No   Food Insecurity: Low Risk  (10/17/2023)    Food Insecurity      Within the past 12 months, did you worry that your food would run out before you got money to buy more?: No      Within the past 12 months, did the food you bought just not last and you didn t have money to get more?:  No   Transportation Needs: Low Risk  (10/17/2023)    Transportation Needs      Within the past 12 months, has lack of transportation kept you from medical appointments, getting your medicines, non-medical meetings or appointments, work, or from getting things that you need?: No   Physical Activity: Not on file   Stress: Not on file   Social Connections: Not on file   Interpersonal Safety: Low Risk  (11/20/2024)    Interpersonal Safety      Do you feel physically and emotionally safe where you currently live?: Yes      Within the past 12 months, have you been hit, slapped, kicked or otherwise physically hurt by someone?: No      Within the past 12 months, have you been humiliated or emotionally abused in other ways by your partner or ex-partner?: No   Housing Stability: Low Risk  (10/17/2023)    Housing Stability      Do you have housing? : Yes      Are you worried about losing your housing?: No       Physical Exam:  /85 (BP Location: Right arm, Patient Position: Sitting, Cuff Size: Adult Large)   Pulse 86   Temp 97.6  F (36.4  C) (Oral)   Resp 18   Wt (!) 144.2 kg (317 lb 14.4 oz)   LMP 01/27/2025 (Within Days)   SpO2 96%   BMI 49.79 kg/m    GENERAL APPEARANCE: healthy, alert and no apparent distress  BREAST: A multipositional, bilateral breast exam was performed.  Fairly symmetrical -Rsl>L. Nipples everted bilaterally. Right breast: no palpable dominant masses, no nipple discharge, no skin changes.  Right axilla: no palpable adenopathy. Left breast: no palpable dominant masses, no nipple discharge, no skin changes. Left axilla: no palpable adenopathy.   LYMPHATICS: No cervical, supraclavicular, or axillary lymphadenopathy  SKIN: no suspicious lesions or rashes on examined    Laboratory/Imaging Studies  No results found for any visits on 02/04/25.    ASSESSMENT    Karolina reports that she is doing well at this visit. She is staying busy with her 6 and 9 year old daughters. She has no concerns with her  breast tissue, and no updates to her family's medical history.     We discussed the screening plan below. She will be due for a breast MRI in August, and to return to see me in February, 2026 with a mammogram following our visit. We discussed concerns and symptoms to be watchful for between visits, including breast lumps, bumps, nipple inversion, nipple discharge and any changes in skin including crinkled or puckered skin. We reviewed the recommendation for breast self awareness.       Individualized Surveillance Plan for women  With 20% or greater lifetime risk of breast cancer   Per NCCN Breast Cancer Screening and Diagnosis Guidelines Version 2.2024   Recommended screening Test or procedure Last done Next Scheduled    Clinical encounter Clinical exam every 6-12 months.   Refer to genetic counseling if not already done.  Consider risk reduction strategies.   February 2025 February 2026   However, some family histories with breast cancers at a very young age, may warrant screening starting earlier.    *May begin at age 40 if breast cancers in the family occur at later ages.    Annual mammogram beginning 10 years younger than the earliest breast cancer in the family but not prior to age 30.    Recommend annual breast MRI to begin 10 years younger than the earliest breast cancer in the family but not prior to age 25.    Breast MRIs are preferably done on day 7-15 of the menstrual cycle in premenopausal women.   1/23/2024: Screening tomosynthesis mammogram, BiRads1    8/22/2024: Breast MRI, BiRads2   Mammogram today    Breast MRI in August    Return to clinic in February, 2026 with a mammogram following our visit   Breast screening for patients at high risk due to thoracic radiation between the ages of 10-30   Annual clinical exam beginning 8 years after radiation therapy.    Annual screening mammogram beginning at age 30 or 8 years after radiation therapy    Annual breast MRI, beginning at age 25 or 8 years after  radiation therapy.     NA   NA   Women who have a lifetime risk of >20% based on history of LCIS or ADH/ALH Annual screening mammogram beginning at age of LCIS or ADH/ALH but not prior to age 30.    Consider annual MRI to begin at age of diagnosis of LCIS or ADH/ALH but not prior to age 25.    Consider risk reducing strategies.   NA   NA    Recommend risk reducing strategies for women with 1.7% 5 year risk of breast cancer.           I spent a total of 19 minutes on the day of the visit. Please see the note for further information on patient assessment and treatment.     Mary Merritt DNP, MIKEL, [facility]  Clinical Nurse Specialist  Cancer Risk Management Program  [facility]    Cc:  MIKEL Maurer, CNP            Again, thank you for allowing me to participate in the care of your patient.        Sincerely,        MIKEL Mancilla CNP    Electronically signed

## 2025-02-04 NOTE — NURSING NOTE
"Oncology Rooming Note    February 4, 2025 2:29 PM   Karolina Herrera is a 45 year old female who presents for:    Chief Complaint   Patient presents with    Oncology Clinic Visit     Breast cancer screening, high risk patient     Initial Vitals: /85 (BP Location: Right arm, Patient Position: Sitting, Cuff Size: Adult Large)   Pulse 86   Temp 97.6  F (36.4  C) (Oral)   Resp 18   Wt (!) 144.2 kg (317 lb 14.4 oz)   LMP 01/27/2025 (Within Days)   SpO2 96%   BMI 49.79 kg/m   Estimated body mass index is 49.79 kg/m  as calculated from the following:    Height as of 12/12/24: 1.702 m (5' 7\").    Weight as of this encounter: 144.2 kg (317 lb 14.4 oz). Body surface area is 2.61 meters squared.  No Pain (0) Comment: Data Unavailable   Patient's last menstrual period was 01/27/2025 (within days).  Allergies reviewed: Yes  Medications reviewed: Yes    Medications: Medication refills not needed today.  Pharmacy name entered into [employer]:    [id number] [place], [place] - [address] [place] [address] [id number] [place], [place] - [address] [place] [address] AVE AT [address]    Frailty Screening:   Is the patient here for a new oncology consult visit in cancer care? 2. No      Clinical concerns: none.       Tatiana Orr"

## 2025-04-15 ENCOUNTER — PATIENT OUTREACH (OUTPATIENT)
Dept: CARE COORDINATION | Facility: CLINIC | Age: 45
End: 2025-04-15
Payer: COMMERCIAL

## 2025-08-05 ENCOUNTER — VIRTUAL VISIT (OUTPATIENT)
Dept: ENDOCRINOLOGY | Facility: CLINIC | Age: 45
End: 2025-08-05
Payer: COMMERCIAL

## 2025-08-05 ENCOUNTER — TELEPHONE (OUTPATIENT)
Dept: ENDOCRINOLOGY | Facility: CLINIC | Age: 45
End: 2025-08-05

## 2025-08-05 VITALS — BODY MASS INDEX: 45.99 KG/M2 | WEIGHT: 293 LBS | HEIGHT: 67 IN

## 2025-08-05 DIAGNOSIS — G47.33 OSA (OBSTRUCTIVE SLEEP APNEA): ICD-10-CM

## 2025-08-05 DIAGNOSIS — E66.813 CLASS 3 SEVERE OBESITY WITH SERIOUS COMORBIDITY AND BODY MASS INDEX (BMI) OF 45.0 TO 49.9 IN ADULT, UNSPECIFIED OBESITY TYPE (H): Primary | ICD-10-CM

## 2025-08-05 PROCEDURE — G2211 COMPLEX E/M VISIT ADD ON: HCPCS | Mod: 95

## 2025-08-05 PROCEDURE — 98007 SYNCH AUDIO-VIDEO EST HI 40: CPT

## 2025-08-06 ENCOUNTER — TELEPHONE (OUTPATIENT)
Dept: ENDOCRINOLOGY | Facility: CLINIC | Age: 45
End: 2025-08-06

## 2025-08-06 DIAGNOSIS — G47.33 OSA (OBSTRUCTIVE SLEEP APNEA): ICD-10-CM

## 2025-08-06 DIAGNOSIS — E66.813 CLASS 3 SEVERE OBESITY WITH SERIOUS COMORBIDITY AND BODY MASS INDEX (BMI) OF 45.0 TO 49.9 IN ADULT, UNSPECIFIED OBESITY TYPE (H): Primary | ICD-10-CM

## 2025-08-06 RX ORDER — SEMAGLUTIDE 0.5 MG/.5ML
0.5 INJECTION, SOLUTION SUBCUTANEOUS WEEKLY
Qty: 2 ML | Refills: 1 | Status: SHIPPED | OUTPATIENT
Start: 2025-08-06

## 2025-08-28 DIAGNOSIS — F41.9 ANXIETY: ICD-10-CM

## 2025-08-28 RX ORDER — LORAZEPAM 1 MG/1
1 TABLET ORAL
Qty: 1 TABLET | Refills: 0 | Status: SHIPPED | OUTPATIENT
Start: 2025-08-28

## (undated) DEVICE — KIT ENDO TURNOVER/PROCEDURE W/CLEAN A SCOPE LINERS 103888

## (undated) DEVICE — ENDO SNARE POLYPECTOMY OVAL 15MM LOOP SD-240U-15

## (undated) RX ORDER — FENTANYL CITRATE 50 UG/ML
INJECTION, SOLUTION INTRAMUSCULAR; INTRAVENOUS
Status: DISPENSED
Start: 2024-11-20

## (undated) RX ORDER — SIMETHICONE 40MG/0.6ML
SUSPENSION, DROPS(FINAL DOSAGE FORM)(ML) ORAL
Status: DISPENSED
Start: 2024-11-20